# Patient Record
Sex: FEMALE | Race: WHITE | Employment: FULL TIME | URBAN - METROPOLITAN AREA
[De-identification: names, ages, dates, MRNs, and addresses within clinical notes are randomized per-mention and may not be internally consistent; named-entity substitution may affect disease eponyms.]

---

## 2017-01-19 ENCOUNTER — OFFICE VISIT (OUTPATIENT)
Dept: INTERNAL MEDICINE CLINIC | Age: 62
End: 2017-01-19

## 2017-01-19 VITALS
DIASTOLIC BLOOD PRESSURE: 81 MMHG | BODY MASS INDEX: 39.78 KG/M2 | SYSTOLIC BLOOD PRESSURE: 147 MMHG | HEART RATE: 75 BPM | HEIGHT: 64 IN | TEMPERATURE: 99 F | OXYGEN SATURATION: 98 % | WEIGHT: 233 LBS | RESPIRATION RATE: 20 BRPM

## 2017-01-19 DIAGNOSIS — I10 ESSENTIAL HYPERTENSION: Primary | ICD-10-CM

## 2017-01-19 DIAGNOSIS — E78.5 HYPERLIPIDEMIA, UNSPECIFIED HYPERLIPIDEMIA TYPE: ICD-10-CM

## 2017-01-19 RX ORDER — LISINOPRIL AND HYDROCHLOROTHIAZIDE 20; 25 MG/1; MG/1
TABLET ORAL
Qty: 30 TAB | Refills: 5 | Status: SHIPPED | OUTPATIENT
Start: 2017-01-19 | End: 2017-10-12 | Stop reason: SDUPTHER

## 2017-01-19 NOTE — PROGRESS NOTES
Reviewed record in preparation for visit and have obtained necessary documentation. Identified pt with two pt identifiers(name and ). Health Maintenance Due   Topic    Hepatitis C Screening     DTaP/Tdap/Td series (1 - Tdap)    PAP AKA CERVICAL CYTOLOGY     BREAST CANCER SCRN MAMMOGRAM     FOBT Q 1 YEAR AGE 50-75     ZOSTER VACCINE AGE 60>     INFLUENZA AGE 9 TO ADULT          No chief complaint on file. Wt Readings from Last 3 Encounters:   17 233 lb (105.7 kg)   16 235 lb (106.6 kg)   10/24/16 235 lb (106.6 kg)     Temp Readings from Last 3 Encounters:   17 99 °F (37.2 °C) (Oral)   16 98.4 °F (36.9 °C) (Oral)   10/24/16 98.3 °F (36.8 °C)     BP Readings from Last 3 Encounters:   17 147/81   16 (!) 155/91   10/24/16 135/64     Pulse Readings from Last 3 Encounters:   17 75   16 70   10/24/16 73           Learning Assessment:  :     Learning Assessment 2017   PRIMARY LEARNER Patient   PRIMARY LANGUAGE ENGLISH   LEARNER PREFERENCE PRIMARY READING   ANSWERED BY self   RELATIONSHIP SELF       Depression Screening:  :     PHQ 2 / 9, over the last two weeks 11/3/2016   Little interest or pleasure in doing things Not at all   Feeling down, depressed or hopeless Not at all   Total Score PHQ 2 0       Fall Risk Assessment:  :     No flowsheet data found. Abuse Screening:  :     Abuse Screening Questionnaire 2017   Do you ever feel afraid of your partner? N   Are you in a relationship with someone who physically or mentally threatens you? N   Is it safe for you to go home?  Y       Coordination of Care Questionnaire:  :     1) Have you been to an emergency room, urgent care clinic since your last visit? no   Hospitalized since your last visit? no             2) Have you seen or consulted any other health care providers outside of [address] [place] [address] since your last visit? no  (Include any pap smears or colon screenings in this section.)    3) Do you have an Advance Directive on file? yes    4) Are you interested in receiving information on Advance Directives? NO      Patient is accompanied by self I have received verbal consent from Nury Ladd to discuss any/all medical information while they are present in the room.

## 2017-01-19 NOTE — PROGRESS NOTES
Subjective:     Nelson Foley is a 59 y.o. female who presents for follow up of hypertension and hyperlipidemia. Diet and Lifestyle: generally follows a low fat low cholesterol diet, reports she just joined the [facility]. she plans on swimming agian. Home BP Monitoring: is not measured at home    Cardiovascular ROS: taking medications as instructed, no medication side effects noted, no TIA's, no chest pain on exertion, no dyspnea on exertion, no swelling of ankles. New concerns: she reports she still has some stress at home with daughter. Her daughter has bipolar disorder and substance abuse problems. She has been going to [employer] [facility] and that has helped a lot. She has been eating good and has joined the [facility]. She likes swimming and plans on getting back into that activity. Patient Active Problem List    Diagnosis Date Noted    Hypertension 07/28/2015    Intracranial hemorrhage ([facility] [address] 07/23/2015     Allergies   Allergen Reactions    Chloromycetin [Chloramphenicol Sod Succinate] Other (comments)     Anemia               Review of Systems, additional:  Pertinent items are noted in HPI. Objective:     Visit Vitals    /81    Pulse 75    Temp 99 °F (37.2 °C) (Oral)    Resp 20    Ht 5' 4\" (1.626 m)    Wt 233 lb (105.7 kg)    SpO2 98%    BMI 39.99 kg/m2     Appearance: alert, well appearing, and in no distress and overweight. General exam: CVS exam BP noted to be mildly elevated today in office, S1, S2 normal, no gallop, no murmur, chest clear, no JVD, no HSM, very mild edema. Lab review: orders written for new lab studies as appropriate; see orders. Assessment/Plan:     hypertension borderline controlled, hyperlipidemia control uncertain. current treatment plan is effective, no change in therapy. Kamille Daigle was seen today for medication evaluation.     Diagnoses and all orders for this visit:    Essential hypertension  -     METABOLIC PANEL, COMPREHENSIVE  -     CBC WITH AUTOMATED DIFF  - lisinopril-hydroCHLOROthiazide (PRINZIDE, ZESTORETIC) 20-25 mg per tablet; take 1 tablet by mouth once daily    Hyperlipidemia, unspecified hyperlipidemia type  -     METABOLIC PANEL, COMPREHENSIVE  -     LIPID PANEL  -     CBC WITH AUTOMATED DIFF    patient states she does not want to take the cholesterol medication. She would like to work hard at diet and exercise and it not better she will go on the the cholesterol lowering medication. She should follow up in 6 months. I have suggested she continue with the support group in reference to her daughter. She is still working on smoker cessation. 4-5 cigs daily supplemented with nicotine gum. I have encouraged her to continue to stop.

## 2017-01-20 LAB
ALBUMIN SERPL-MCNC: 4.4 G/DL (ref 3.6–4.8)
ALBUMIN/GLOB SERPL: 1.7 {RATIO} (ref 1.1–2.5)
ALP SERPL-CCNC: 85 IU/L (ref 39–117)
ALT SERPL-CCNC: 23 IU/L (ref 0–32)
AST SERPL-CCNC: 17 IU/L (ref 0–40)
BASOPHILS # BLD AUTO: 0 X10E3/UL (ref 0–0.2)
BASOPHILS NFR BLD AUTO: 0 %
BILIRUB SERPL-MCNC: 0.3 MG/DL (ref 0–1.2)
BUN SERPL-MCNC: 15 MG/DL (ref 8–27)
BUN/CREAT SERPL: 20 (ref 11–26)
CALCIUM SERPL-MCNC: 10 MG/DL (ref 8.7–10.3)
CHLORIDE SERPL-SCNC: 101 MMOL/L (ref 96–106)
CHOLEST SERPL-MCNC: 245 MG/DL (ref 100–199)
CO2 SERPL-SCNC: 23 MMOL/L (ref 18–29)
CREAT SERPL-MCNC: 0.75 MG/DL (ref 0.57–1)
EOSINOPHIL # BLD AUTO: 0.2 X10E3/UL (ref 0–0.4)
EOSINOPHIL NFR BLD AUTO: 2 %
ERYTHROCYTE [DISTWIDTH] IN BLOOD BY AUTOMATED COUNT: 13.7 % (ref 12.3–15.4)
GLOBULIN SER CALC-MCNC: 2.6 G/DL (ref 1.5–4.5)
GLUCOSE SERPL-MCNC: 86 MG/DL (ref 65–99)
HCT VFR BLD AUTO: 44.6 % (ref 34–46.6)
HDLC SERPL-MCNC: 30 MG/DL
HGB BLD-MCNC: 15.2 G/DL (ref 11.1–15.9)
IMM GRANULOCYTES # BLD: 0 X10E3/UL (ref 0–0.1)
IMM GRANULOCYTES NFR BLD: 0 %
INTERPRETATION, 910389: NORMAL
LDLC SERPL CALC-MCNC: ABNORMAL MG/DL (ref 0–99)
LYMPHOCYTES # BLD AUTO: 2.8 X10E3/UL (ref 0.7–3.1)
LYMPHOCYTES NFR BLD AUTO: 28 %
MCH RBC QN AUTO: 30.6 PG (ref 26.6–33)
MCHC RBC AUTO-ENTMCNC: 34.1 G/DL (ref 31.5–35.7)
MCV RBC AUTO: 90 FL (ref 79–97)
MONOCYTES # BLD AUTO: 0.5 X10E3/UL (ref 0.1–0.9)
MONOCYTES NFR BLD AUTO: 5 %
NEUTROPHILS # BLD AUTO: 6.3 X10E3/UL (ref 1.4–7)
NEUTROPHILS NFR BLD AUTO: 65 %
PLATELET # BLD AUTO: 293 X10E3/UL (ref 150–379)
POTASSIUM SERPL-SCNC: 3.9 MMOL/L (ref 3.5–5.2)
PROT SERPL-MCNC: 7 G/DL (ref 6–8.5)
RBC # BLD AUTO: 4.97 X10E6/UL (ref 3.77–5.28)
SODIUM SERPL-SCNC: 143 MMOL/L (ref 134–144)
TRIGL SERPL-MCNC: 417 MG/DL (ref 0–149)
VLDLC SERPL CALC-MCNC: ABNORMAL MG/DL (ref 5–40)
WBC # BLD AUTO: 9.9 X10E3/UL (ref 3.4–10.8)

## 2017-01-20 NOTE — PROGRESS NOTES
Please let the patient know all labs look okay except her cholesterol. Her triglycerides is 417 and total cholesterol is 245. I know she does not want to take cholesterol medication but with a number this high I do not believe she can get it in goal with exercise and diet alone. I think she should get back on her cholesterol medication. Please let me know the patient's thoughts.  thanks

## 2017-01-23 NOTE — PROGRESS NOTES
Writer contacted patient to inform of results per Mamta, patient verbalized understanding and is will to go back on medication.

## 2017-10-12 DIAGNOSIS — I10 ESSENTIAL HYPERTENSION: ICD-10-CM

## 2017-10-12 RX ORDER — LISINOPRIL AND HYDROCHLOROTHIAZIDE 20; 25 MG/1; MG/1
TABLET ORAL
Qty: 30 TAB | Refills: 2 | Status: SHIPPED | OUTPATIENT
Start: 2017-10-12 | End: 2017-11-03 | Stop reason: SDUPTHER

## 2017-11-03 ENCOUNTER — HOSPITAL ENCOUNTER (OUTPATIENT)
Dept: GENERAL RADIOLOGY | Age: 62
Discharge: HOME | End: 2017-11-03
Attending: PHYSICIAN ASSISTANT
Payer: COMMERCIAL

## 2017-11-03 ENCOUNTER — OFFICE VISIT (OUTPATIENT)
Dept: INTERNAL MEDICINE CLINIC | Age: 62
End: 2017-11-03

## 2017-11-03 VITALS
RESPIRATION RATE: 20 BRPM | WEIGHT: 228 LBS | BODY MASS INDEX: 38.93 KG/M2 | TEMPERATURE: 98.3 F | OXYGEN SATURATION: 97 % | HEIGHT: 64 IN

## 2017-11-03 DIAGNOSIS — I10 ESSENTIAL HYPERTENSION: Primary | ICD-10-CM

## 2017-11-03 DIAGNOSIS — R05.9 COUGH: ICD-10-CM

## 2017-11-03 DIAGNOSIS — E78.5 HYPERLIPIDEMIA, UNSPECIFIED HYPERLIPIDEMIA TYPE: ICD-10-CM

## 2017-11-03 PROCEDURE — 71020 XR CHEST PA LAT: CPT

## 2017-11-03 RX ORDER — ATORVASTATIN CALCIUM 10 MG/1
TABLET, FILM COATED ORAL
Qty: 90 TAB | Refills: 1 | Status: SHIPPED | OUTPATIENT
Start: 2017-11-03 | End: 2018-05-29 | Stop reason: SDUPTHER

## 2017-11-03 RX ORDER — LEVOFLOXACIN 500 MG/1
500 TABLET, FILM COATED ORAL DAILY
Qty: 7 TAB | Refills: 0 | Status: SHIPPED | OUTPATIENT
Start: 2017-11-03 | End: 2018-02-07 | Stop reason: ALTCHOICE

## 2017-11-03 RX ORDER — LISINOPRIL AND HYDROCHLOROTHIAZIDE 20; 25 MG/1; MG/1
TABLET ORAL
Qty: 90 TAB | Refills: 1 | Status: SHIPPED | OUTPATIENT
Start: 2017-11-03 | End: 2019-03-01 | Stop reason: SDUPTHER

## 2017-11-03 RX ORDER — CODEINE PHOSPHATE AND GUAIFENESIN 10; 100 MG/5ML; MG/5ML
5 SOLUTION ORAL
Qty: 100 ML | Refills: 0 | Status: SHIPPED | OUTPATIENT
Start: 2017-11-03 | End: 2018-02-07 | Stop reason: ALTCHOICE

## 2017-11-03 NOTE — PROGRESS NOTES
Reviewed record in preparation for visit and have obtained necessary documentation. Identified pt with two pt identifiers(name and ). Health Maintenance Due   Topic    Hepatitis C Screening     DTaP/Tdap/Td series (1 - Tdap)    PAP AKA CERVICAL CYTOLOGY     BREAST CANCER SCRN MAMMOGRAM     FOBT Q 1 YEAR AGE 50-75     ZOSTER VACCINE AGE 60>     INFLUENZA AGE 9 TO ADULT          No chief complaint on file. Wt Readings from Last 3 Encounters:   17 228 lb (103.4 kg)   17 233 lb (105.7 kg)   16 235 lb (106.6 kg)     Temp Readings from Last 3 Encounters:   17 98.3 °F (36.8 °C) (Oral)   17 99 °F (37.2 °C) (Oral)   16 98.4 °F (36.9 °C) (Oral)     BP Readings from Last 3 Encounters:   17 147/81   16 (!) 155/91   10/24/16 135/64     Pulse Readings from Last 3 Encounters:   17 75   16 70   10/24/16 73           Learning Assessment:  :     Learning Assessment 2017   PRIMARY LEARNER Patient   PRIMARY LANGUAGE ENGLISH   LEARNER PREFERENCE PRIMARY READING   ANSWERED BY self   RELATIONSHIP SELF       Depression Screening:  :     PHQ over the last two weeks 2017   Little interest or pleasure in doing things Not at all   Feeling down, depressed or hopeless Not at all   Total Score PHQ 2 0       Fall Risk Assessment:  :     No flowsheet data found. Abuse Screening:  :     Abuse Screening Questionnaire 2017   Do you ever feel afraid of your partner? N   Are you in a relationship with someone who physically or mentally threatens you? N   Is it safe for you to go home?  Y       Coordination of Care Questionnaire:  :     1) Have you been to an emergency room, urgent care clinic since your last visit? no   Hospitalized since your last visit? no             2) Have you seen or consulted any other health care providers outside of [address] [place] [address] since your last visit? no  (Include any pap smears or colon screenings in this section.)    3) Do you have an Advance Directive on file? yes    4) Are you interested in receiving information on Advance Directives? NO      Patient is accompanied by self I have received verbal consent from Chaur Velasquez to discuss any/all medical information while they are present in the room.

## 2017-11-03 NOTE — PROGRESS NOTES
Subjective:   Zen Montaño is a 57 y.o. female who complains of productive cough, headache and fatigue (feeling feverish) for 2 days, gradually worsening since that time. She denies a history of nausea. She reports she has two co-workers out with pneumonia. She started yesterday with sore throat and headache. She went to work but by the end of the day she was coughing. This am she feels very tired. Her cough has been productive. She is concerned because she has had pneumonia in the past. She states she has been wheezing as well. Evaluation to date: none. Treatment to date: none. Patient does smoke cigarettes. Relevant PMH: No pertinent additional PMH. Patient Active Problem List    Diagnosis Date Noted    Hypertension 07/28/2015    Intracranial hemorrhage ([facility] [address] 07/23/2015     Allergies   Allergen Reactions    Chloromycetin [Chloramphenicol Sod Succinate] Other (comments)     Anemia          Review of Systems  Pertinent items are noted in HPI. Objective:     Visit Vitals    Temp 98.3 °F (36.8 °C) (Oral)    Resp 20    Ht 5' 4\" (1.626 m)    Wt 228 lb (103.4 kg)    SpO2 97%    BMI 39.14 kg/m2     General:  alert, cooperative, no distress   Eyes: negative   Ears: normal TM's and external ear canals AU   Sinuses: Normal paranasal sinuses without tenderness   Mouth:  abnormal findings: mild oropharyngeal erythema   Neck: no adenopathy. Heart: normal rate and regular rhythm, no murmurs noted. Lungs: clear to auscultation bilaterally, no current wheeze   Abdomen: Not examined        Assessment/Plan:   bronchitis  Antibiotics per orders. RTC prn. Diagnoses and all orders for this visit:    1. Essential hypertension  -     CBC WITH AUTOMATED DIFF  -     METABOLIC PANEL, COMPREHENSIVE  -     lisinopril-hydroCHLOROthiazide (PRINZIDE, ZESTORETIC) 20-25 mg per tablet; take 1 tablet by mouth once daily    2.  Hyperlipidemia, unspecified hyperlipidemia type  -     CBC WITH AUTOMATED DIFF  - METABOLIC PANEL, COMPREHENSIVE  -     LIPID PANEL  -     atorvastatin (LIPITOR) 10 mg tablet; take 1 tablet by mouth once daily    3. Cough  -     XR CHEST PA LAT; Future  -     levoFLOXacin (LEVAQUIN) 500 mg tablet; Take 1 Tab by mouth daily.  -     guaiFENesin-codeine (CHERATUSSIN AC) 100-10 mg/5 mL solution; Take 5 mL by mouth three (3) times daily as needed for Cough or Congestion. Max Daily Amount: 15 mL. .refills ordered. I will contact her with the results once back. I would like for the patient to get a chest xray today. Advised her to drink fluids and rest. All this discussed with the patient, she understands and agrees.

## 2017-11-03 NOTE — MR AVS SNAPSHOT
Visit Information Date & Time Provider Department Dept. Phone Encounter #  
 11/3/2017 11:10 AM Bobby Quinones PA-C [facility] [phone] [id number] Upcoming Health Maintenance Date Due Hepatitis C Screening 1955 DTaP/Tdap/Td series (1 - Tdap) 10/25/1974 PAP AKA CERVICAL CYTOLOGY 10/25/1974 BREAST CANCER SCRN MAMMOGRAM 10/25/2003 FOBT Q 1 YEAR AGE 50-75 10/25/2003 ZOSTER VACCINE AGE 60> 8/25/2013 INFLUENZA AGE 9 TO ADULT 8/1/2017 Allergies as of 11/3/2017  In Progress On: 11/3/2017 By: Jonah Humphrey LPN Severity Noted Reaction Type Reactions Chloromycetin [Chloramphenicol Sod Succinate]  07/23/2015    Other (comments) Anemia Current Immunizations  Never Reviewed Name Date Pneumococcal Polysaccharide (PPSV-23) 7/24/2015 12:31 PM  
  
 Not reviewed this visit You Were Diagnosed With   
  
 Codes Comments Essential hypertension    -  Primary ICD-10-CM: I10 
ICD-9-CM: 401.9 Hyperlipidemia, unspecified hyperlipidemia type     ICD-10-CM: E78.5 ICD-9-CM: 272.4 Cough     ICD-10-CM: R05 ICD-9-CM: 685. 2 Vitals Temp Resp Height(growth percentile) Weight(growth percentile) SpO2 BMI  
 98.3 °F (36.8 °C) (Oral) 20 5' 4\" (1.626 m) 228 lb (103.4 kg) 97% 39.14 kg/m2 OB Status Smoking Status Postmenopausal Current Every Day Smoker BMI and BSA Data Body Mass Index Body Surface Area  
 39.14 kg/m 2 2.16 m 2 Preferred Pharmacy Pharmacy Name Phone [facility] [address] [place] [address] [phone] Your Updated Medication List  
  
   
This list is accurate as of: 11/3/17 11:17 AM.  Always use your most recent med list.  
  
  
  
  
 albuterol 90 mcg/actuation inhaler Commonly known as:  PROVENTIL HFA, VENTOLIN HFA, PROAIR HFA Take 2 Puffs by inhalation every four (4) hours as needed for Wheezing. aspirin 81 mg chewable tablet Take 81 mg by mouth daily. Indications: heart palpitations  
  
 atorvastatin 10 mg tablet Commonly known as:  LIPITOR  
take 1 tablet by mouth once daily BENADRYL 25 mg capsule Generic drug:  diphenhydrAMINE Take 25 mg by mouth every four (4) hours as needed. EPINEPHrine 0.3 mg/0.3 mL injection Commonly known as:  EPIPEN 2-ASHLEY  
0.3 mL by IntraMUSCular route once as needed for up to 1 dose. guaiFENesin-codeine 100-10 mg/5 mL solution Commonly known as:  Katheen Hoe Take 5 mL by mouth three (3) times daily as needed for Cough or Congestion. Max Daily Amount: 15 mL. levoFLOXacin 500 mg tablet Commonly known as:  Charlcie Abelino Take 1 Tab by mouth daily. lisinopril-hydroCHLOROthiazide 20-25 mg per tablet Commonly known as:  PRINZIDE, ZESTORETIC  
take 1 tablet by mouth once daily MAGNESIUM CITRATE PO Take 1,000 Caps by mouth daily. TYLENOL 325 mg tablet Generic drug:  acetaminophen Take 325 mg by mouth as needed for Pain. Indications: PAIN, head VITAMIN C 1,000 mg Pwep Generic drug:  Ascorbate Ca-Multivit-Min Take 1,000 mg by mouth daily. Prescriptions Printed Refills  
 guaiFENesin-codeine (CHERATUSSIN AC) 100-10 mg/5 mL solution 0 Sig: Take 5 mL by mouth three (3) times daily as needed for Cough or Congestion. Max Daily Amount: 15 mL. Class: Print Route: Oral  
  
Prescriptions Sent to Pharmacy Refills  
 levoFLOXacin (LEVAQUIN) 500 mg tablet 0 Sig: Take 1 Tab by mouth daily. Class: Normal  
 Pharmacy: [address] [facility]  Stana Meckel, [address] [place] [address] #: [phone] Route: Oral  
  
We Performed the Following CBC WITH AUTOMATED DIFF [21334 CPT(R)] LIPID PANEL [17632 CPT(R)] METABOLIC PANEL, COMPREHENSIVE [50074 CPT(R)] To-Do List   
 11/03/2017 Imaging:  XR CHEST PA LAT Introducing [facility]!    
 Dear Rosalva Garcia: 
 Thank you for requesting a [employer] account. Our records indicate that you already have an active [employer] account. You can access your account anytime at [email or web address]. [email or web address] Did you know that you can access your hospital and ER discharge instructions at any time in [employer]? You can also review all of your test results from your hospital stay or ER visit. Additional Information If you have questions, please visit the Frequently Asked Questions section of the [employer] website at [email or web address]. [email or web address] Remember, [employer] is NOT to be used for urgent needs. For medical emergencies, dial 911. Now available from your iPhone and Android! Please provide this summary of care documentation to your next provider. Your primary care clinician is listed as Mamta Montero. If you have any questions after today's visit, please call [phone].

## 2017-11-03 NOTE — LETTER
NOTIFICATION RETURN TO WORK / SCHOOL 
 
11/3/2017 11:13 AM 
 
Ms. Zaid Mireles [facility] [address] [phone] To Whom It May Concern: 
 
Zaid Mireles is currently under the care of Fam Sanchez.. She will return to work/school on: 11/6/2017 If there are questions or concerns please have the patient contact our office.  
 
 
 
Sincerely, 
 
 
Andrés Montero PA-C

## 2017-11-03 NOTE — PROGRESS NOTES
Writer contacted patient to inform of xray results per Srinivasa Garcia, patient verbalized understanding.

## 2017-11-03 NOTE — PROGRESS NOTES
Please let the patient know no pneumonia on x-ray seen. Please finish the medication and follow up if not better.  thanks

## 2018-02-07 ENCOUNTER — OFFICE VISIT (OUTPATIENT)
Dept: INTERNAL MEDICINE CLINIC | Age: 63
End: 2018-02-07

## 2018-02-07 VITALS
BODY MASS INDEX: 40.15 KG/M2 | OXYGEN SATURATION: 96 % | WEIGHT: 235.2 LBS | HEART RATE: 68 BPM | TEMPERATURE: 98.3 F | SYSTOLIC BLOOD PRESSURE: 138 MMHG | DIASTOLIC BLOOD PRESSURE: 76 MMHG | RESPIRATION RATE: 18 BRPM | HEIGHT: 64 IN

## 2018-02-07 DIAGNOSIS — H65.92 LEFT NON-SUPPURATIVE OTITIS MEDIA: ICD-10-CM

## 2018-02-07 DIAGNOSIS — H92.02 EAR PAIN, LEFT: Primary | ICD-10-CM

## 2018-02-07 PROBLEM — E66.01 OBESITY, MORBID (HCC): Status: ACTIVE | Noted: 2018-02-07

## 2018-02-07 NOTE — PROGRESS NOTES
Chief Complaint   Patient presents with    Ear Pain     previous bilateral ear infection- two weeks ago at [facility] (01/24)     1. Have you been to the ER, urgent care clinic since your last visit? Hospitalized since your last visit? No    2. Have you seen or consulted any other health care providers outside of the [address] [place] [address] since your last visit? Include any pap smears or colon screening.  No

## 2018-02-07 NOTE — PATIENT INSTRUCTIONS
Start Mucinex 1200mg twice a day (plain not D or DM). Start Saline nasal spray 2 squirts each nostril 2-3 times daily.

## 2018-02-07 NOTE — PROGRESS NOTES
HISTORY OF PRESENT ILLNESS  Duncan Alvarez is a 57 y.o. female. HPI   Diagnosed with bilat ear infection 1/24 by [facility]. Had a sore throat but ears were not hurting at that time. Had negative stress test.  Placed on Augmentin and lidocaine mouthwash (did not help). Improved but still having pain in left jaw and posterior ear. Right ear and jaw/throat feels normal.  Hearing on left feels \"a little cloudy\". Congestion in nasal passages and sinuses. Nasal discharge is white with occ yellow. No cough, no fevers or chills. Took some Allegra. Current Outpatient Prescriptions:     lisinopril-hydroCHLOROthiazide (PRINZIDE, ZESTORETIC) 20-25 mg per tablet, take 1 tablet by mouth once daily, Disp: 90 Tab, Rfl: 1    atorvastatin (LIPITOR) 10 mg tablet, take 1 tablet by mouth once daily, Disp: 90 Tab, Rfl: 1    diphenhydrAMINE (BENADRYL) 25 mg capsule, Take 25 mg by mouth every four (4) hours as needed. , Disp: , Rfl:     albuterol (PROVENTIL HFA, VENTOLIN HFA, PROAIR HFA) 90 mcg/actuation inhaler, Take 2 Puffs by inhalation every four (4) hours as needed for Wheezing., Disp: 1 Inhaler, Rfl: 1    Ascorbate Ca-Multivit-Min (VITAMIN C) 1,000 mg pwep, Take 1,000 mg by mouth daily. , Disp: , Rfl:     MAGNESIUM CITRATE PO, Take 1,000 Caps by mouth daily. , Disp: , Rfl:     EPINEPHrine (EPIPEN 2-ASHLEY) 0.3 mg/0.3 mL (1:1,000) injection, 0.3 mL by IntraMUSCular route once as needed for up to 1 dose., Disp: 1 Syringe, Rfl: 2    acetaminophen (TYLENOL) 325 mg tablet, Take 325 mg by mouth as needed for Pain. Indications: PAIN, head, Disp: , Rfl:     aspirin 81 mg chewable tablet, Take 81 mg by mouth daily.  Indications: heart palpitations, Disp: , Rfl:     Visit Vitals    /76 (BP 1 Location: Right arm, BP Patient Position: Sitting)    Pulse 68    Temp 98.3 °F (36.8 °C) (Oral)    Resp 18    Ht 5' 4\" (1.626 m)    Wt 235 lb 3.2 oz (106.7 kg)    SpO2 96%    BMI 40.37 kg/m2     ROS  See above  Physical Exam Constitutional: She appears well-developed and well-nourished. HENT:   Head: Normocephalic and atraumatic. Right Ear: External ear normal.   Left ear - TM fullness without erythema. nml ext canal.  Tenderness post left ear. Sinuses nontender  Nares - edematous with thick discharge. OP - post nasal drainage. Neck: Neck supple. No thyromegaly present. Cardiovascular: Normal rate, regular rhythm and normal heart sounds. Pulmonary/Chest: Effort normal and breath sounds normal.   Vitals reviewed. ASSESSMENT and PLAN  Left posterior ear pain with recent left ear infection - ? Infection vs inflammation. Check CBC. Hold additional abx at this time. Mucinex and saline ns.     Orders Placed This Encounter    CBC WITH AUTOMATED DIFF    guaiFENesin (MUCINEX) 1,200 mg Ta12 ER tablet    sodium chloride (SALINE NASAL) 0.65 % nasal squeeze bottle     Follow-up Disposition: Not on File

## 2018-02-08 LAB
BASOPHILS # BLD AUTO: 0.1 X10E3/UL (ref 0–0.2)
BASOPHILS NFR BLD AUTO: 1 %
EOSINOPHIL # BLD AUTO: 0.2 X10E3/UL (ref 0–0.4)
EOSINOPHIL NFR BLD AUTO: 2 %
ERYTHROCYTE [DISTWIDTH] IN BLOOD BY AUTOMATED COUNT: 14 % (ref 12.3–15.4)
HCT VFR BLD AUTO: 42.6 % (ref 34–46.6)
HGB BLD-MCNC: 14.5 G/DL (ref 11.1–15.9)
IMM GRANULOCYTES # BLD: 0 X10E3/UL (ref 0–0.1)
IMM GRANULOCYTES NFR BLD: 0 %
LYMPHOCYTES # BLD AUTO: 3.1 X10E3/UL (ref 0.7–3.1)
LYMPHOCYTES NFR BLD AUTO: 34 %
MCH RBC QN AUTO: 30.1 PG (ref 26.6–33)
MCHC RBC AUTO-ENTMCNC: 34 G/DL (ref 31.5–35.7)
MCV RBC AUTO: 88 FL (ref 79–97)
MONOCYTES # BLD AUTO: 0.5 X10E3/UL (ref 0.1–0.9)
MONOCYTES NFR BLD AUTO: 5 %
NEUTROPHILS # BLD AUTO: 5.2 X10E3/UL (ref 1.4–7)
NEUTROPHILS NFR BLD AUTO: 58 %
PLATELET # BLD AUTO: 287 X10E3/UL (ref 150–379)
RBC # BLD AUTO: 4.82 X10E6/UL (ref 3.77–5.28)
WBC # BLD AUTO: 9 X10E3/UL (ref 3.4–10.8)

## 2018-02-09 ENCOUNTER — TELEPHONE (OUTPATIENT)
Dept: INTERNAL MEDICINE CLINIC | Age: 63
End: 2018-02-09

## 2018-02-20 ENCOUNTER — OFFICE VISIT (OUTPATIENT)
Dept: INTERNAL MEDICINE CLINIC | Age: 63
End: 2018-02-20

## 2018-02-20 VITALS
WEIGHT: 235 LBS | HEART RATE: 74 BPM | SYSTOLIC BLOOD PRESSURE: 149 MMHG | BODY MASS INDEX: 40.12 KG/M2 | OXYGEN SATURATION: 96 % | HEIGHT: 64 IN | DIASTOLIC BLOOD PRESSURE: 87 MMHG | TEMPERATURE: 98.2 F | RESPIRATION RATE: 20 BRPM

## 2018-02-20 DIAGNOSIS — R22.2 MASS OF CHEST WALL, LEFT: Primary | ICD-10-CM

## 2018-02-20 DIAGNOSIS — R22.2 MASS OF SUBCUTANEOUS TISSUE OF BACK: ICD-10-CM

## 2018-02-20 NOTE — PROGRESS NOTES
Reviewed record in preparation for visit and have obtained necessary documentation. Identified pt with two pt identifiers(name and ). Health Maintenance Due   Topic    Hepatitis C Screening     DTaP/Tdap/Td series (1 - Tdap)    PAP AKA CERVICAL CYTOLOGY     BREAST CANCER SCRN MAMMOGRAM     FOBT Q 1 YEAR AGE 54-65     ZOSTER VACCINE AGE 60>          No chief complaint on file. Wt Readings from Last 3 Encounters:   18 235 lb (106.6 kg)   18 235 lb 3.2 oz (106.7 kg)   17 228 lb (103.4 kg)     Temp Readings from Last 3 Encounters:   18 98.2 °F (36.8 °C) (Oral)   18 98.3 °F (36.8 °C) (Oral)   17 98.3 °F (36.8 °C) (Oral)     BP Readings from Last 3 Encounters:   18 138/76   17 147/81   16 (!) 155/91     Pulse Readings from Last 3 Encounters:   18 68   17 75   16 70           Learning Assessment:  :     Learning Assessment 2018   PRIMARY LEARNER Patient Patient   PRIMARY LANGUAGE ENGLISH ENGLISH   LEARNER PREFERENCE PRIMARY DEMONSTRATION READING   ANSWERED BY self self   RELATIONSHIP SELF SELF       Depression Screening:  :     PHQ over the last two weeks 11/3/2017   Little interest or pleasure in doing things Not at all   Feeling down, depressed or hopeless Not at all   Total Score PHQ 2 0       Fall Risk Assessment:  :     No flowsheet data found. Abuse Screening:  :     Abuse Screening Questionnaire 2018   Do you ever feel afraid of your partner? N N   Are you in a relationship with someone who physically or mentally threatens you? N N   Is it safe for you to go home? Y Y       Coordination of Care Questionnaire:  :     1) Have you been to an emergency room, urgent care clinic since your last visit? yes   Hospitalized since your last visit? no             2) Have you seen or consulted any other health care providers outside of [address] [place] [address] since your last visit?  yes  (Include any pap smears or colon screenings in this section.)    3) Do you have an Advance Directive on file? no    4) Are you interested in receiving information on Advance Directives? NO      Patient is accompanied by self I have received verbal consent from Tano Galeas to discuss any/all medical information while they are present in the room.

## 2018-02-20 NOTE — PROGRESS NOTES
HISTORY OF PRESENT ILLNESS  Michael Espana is a 57 y.o. female. HPI  Patient presents to the office for evaluation of some bumps. She reports she noticed these lumps about 2 years ago but always ignored them because they never caused problems. Recently now she has noticed more lumps and some of them are painful. She states she has not been sleeping well due to some pain. She reports the one on her chest she thought was a lymph node so she asked about it during her mammogram and was told it was not a lymph node. Review of Systems   Skin:        \"lumps\" under the skin     Blood pressure 149/87, pulse 74, temperature 98.2 °F (36.8 °C), temperature source Oral, resp. rate 20, height 5' 4\" (1.626 m), weight 235 lb (106.6 kg), SpO2 96 %. Physical Exam   Skin:   Anterior chest left side just above the breast- palpable soft smooth feeling mass noted. Right anterior chest just below the ribs- palpable soft smooth feeling mass  Right side upper back- palpable soft mass noted. ASSESSMENT and PLAN  Diagnoses and all orders for this visit:    1. Mass of chest wall, left  -     [facility] General Surgery ref [facility]    2. Mass of subcutaneous tissue of back  -     Giovanni General Surgery ref [facility]    I explained to the patient that the areas feel like cyst. I would like for her to see the general surgeon to have these area evaluated. She will contact him for an appointment.  All this discussed with the patient and she understands and agrees

## 2018-02-27 ENCOUNTER — OFFICE VISIT (OUTPATIENT)
Dept: SURGERY | Age: 63
End: 2018-02-27

## 2018-02-27 VITALS
SYSTOLIC BLOOD PRESSURE: 140 MMHG | TEMPERATURE: 99.6 F | RESPIRATION RATE: 20 BRPM | BODY MASS INDEX: 40.12 KG/M2 | HEART RATE: 79 BPM | OXYGEN SATURATION: 97 % | DIASTOLIC BLOOD PRESSURE: 80 MMHG | WEIGHT: 235 LBS | HEIGHT: 64 IN

## 2018-02-27 DIAGNOSIS — R22.9 SUBCUTANEOUS MASS: Primary | ICD-10-CM

## 2018-02-28 PROBLEM — R22.9 SUBCUTANEOUS MASS: Status: ACTIVE | Noted: 2018-02-28

## 2018-02-28 NOTE — PROGRESS NOTES
Surgery Consult    Subjective:      Noé Ochoa is a 57 y.o. female who is being seen for evaluation of subcutaneous masses on her trunk. She notes the gradual development of subcutaneous nodules beneath skin of chest, upper abdomen. These lesions have slowly increased in size and have become tender, worse with activity or palpation. She denies prior infection/drainage, fever, chills, chest pain, or wheezing. Patient Active Problem List    Diagnosis Date Noted    Subcutaneous masses, trunk 02/28/2018    Obesity, morbid ([facility] 02/07/2018    Hypertension 07/28/2015    Intracranial hemorrhage ([facility] 07/23/2015     Past Medical History:   Diagnosis Date    High cholesterol     Hypertension     Ill-defined condition 2015    frontal lobe bleed    Musculoskeletal disorder       Past Surgical History:   Procedure Laterality Date    HX ABDOMINAL LAPAROSCOPY      HX APPENDECTOMY      HX TONSILLECTOMY      HX TUBAL LIGATION  1989      Social History   Substance Use Topics    Smoking status: Current Every Day Smoker     Packs/day: 0.50     Years: 42.00     Types: Cigarettes     Last attempt to quit: 5/19/2015    Smokeless tobacco: Never Used    Alcohol use Yes      Comment: occasional      Family History   Problem Relation Age of Onset    Heart Disease Mother     Hypertension Mother     Diabetes Mother     Heart Disease Father     Diabetes Father     Hypertension Father       Current Outpatient Prescriptions   Medication Sig    lisinopril-hydroCHLOROthiazide (PRINZIDE, ZESTORETIC) 20-25 mg per tablet take 1 tablet by mouth once daily    atorvastatin (LIPITOR) 10 mg tablet take 1 tablet by mouth once daily    Ascorbate Ca-Multivit-Min (VITAMIN C) 1,000 mg pwep Take 1,000 mg by mouth daily.  MAGNESIUM CITRATE PO Take 1,000 Caps by mouth daily.  acetaminophen (TYLENOL) 325 mg tablet Take 325 mg by mouth as needed for Pain.  Indications: PAIN, head    aspirin 81 mg chewable tablet Take 81 mg by mouth daily. Indications: heart palpitations    guaiFENesin (MUCINEX) 1,200 mg Ta12 ER tablet Take 1 Tab by mouth two (2) times a day.  sodium chloride (SALINE NASAL) 0.65 % nasal squeeze bottle 0.1 mL by Both Nostrils route three (3) times daily.  diphenhydrAMINE (BENADRYL) 25 mg capsule Take 25 mg by mouth every four (4) hours as needed.  albuterol (PROVENTIL HFA, VENTOLIN HFA, PROAIR HFA) 90 mcg/actuation inhaler Take 2 Puffs by inhalation every four (4) hours as needed for Wheezing.  EPINEPHrine (EPIPEN 2-ASHLEY) 0.3 mg/0.3 mL (1:1,000) injection 0.3 mL by IntraMUSCular route once as needed for up to 1 dose. No current facility-administered medications for this visit. Allergies   Allergen Reactions    Chloromycetin [Chloramphenicol Sod Succinate] Other (comments)     Anemia         Review of Systems:    A complete review of systems was negative except as noted in the HPI. Objective:        Visit Vitals    /80    Pulse 79    Temp 99.6 °F (37.6 °C)    Resp 20    Ht 5' 4\" (1.626 m)    Wt 235 lb (106.6 kg)    SpO2 97%    BMI 40.34 kg/m2       Physical Exam:  GENERAL: alert, cooperative, no distress, appears stated age, morbidly obese, EYE: negative, LYMPH NODES: Cervical, supraclavicular nodes normal. THROAT & NECK: normal, LUNG: clear to auscultation bilaterally, HEART: regular rate and rhythm, S1, S2 normal, no murmur. ABDOMEN: Obese, non-distended, soft; no hernia or pain with palpation. EXTREMITIES:  extremities normal, atraumatic, no cyanosis or edema, SKIN: multiple 1 x 1 cm tender subcutaneous lesions along left chest, right chest, and right upper quadrant. No overlying cellulitis. NEUROLOGIC: negative      Assessment:     Multiple tender subcutaneous masses over upper trunk. Plan:     1. I recommend proceeding with excisional biopsy of upper trunk lesions.      2. Discussed aspects of surgical intervention, methods, risks (including by not limited to infection, bleeding, hematoma, recurrence, and development of new lesions), and the risks of the anesthetic. The patient understands the risks; all questions were answered to the patient's satisfaction. 3. Patient does wish to proceed with surgery.       Signed By: Jose G Newsome MD     February 28, 2018

## 2018-02-28 NOTE — PATIENT INSTRUCTIONS
Epidermoid Cyst: Care Instructions  Your Care Instructions  An epidermoid (say \"xb-hmw-EST-brenda\") cyst is a lump just under the skin. These cysts can form when a hair follicle becomes blocked. They are common in acne and may occur on the face, neck, back, and genitals. However, they can form anywhere on the body. These cysts are not cancer and do not lead to cancer. They tend not to hurt, but they can sometimes become swollen and painful. They also may break open (rupture) and cause scarring. These cysts sometimes do not cause problems and may not need treatment. If you have a cyst that is swollen and hurts, your doctor may inject it with a medicine to help it heal. But it is more likely that a painful cyst will need to be removed. Your doctor will give you a shot of numbing medicine and cut into the cyst to drain it or remove it. This makes the symptoms go away. Follow-up care is a key part of your treatment and safety. Be sure to make and go to all appointments, and call your doctor if you are having problems. It's also a good idea to know your test results and keep a list of the medicines you take. How can you care for yourself at home? · Do not squeeze the cyst or poke it with a needle to open it. This can cause swelling, redness, and infection. · Always have a doctor look at any new lumps you get to make sure that they are not serious. When should you call for help? Watch closely for changes in your health, and be sure to contact your doctor if:  ? · You have a fever, redness, or swelling after you get a shot of medicine in the cyst.   ? · You see or feel a new lump on your skin. Where can you learn more? Go to http://kaykay-lucas.info/. Enter G748 in the search box to learn more about \"Epidermoid Cyst: Care Instructions. \"  Current as of: October 13, 2016  Content Version: 11.4  © 6583-1898 [employer].  Care instructions adapted under license by [employer] (which disclaims liability or warranty for this information). If you have questions about a medical condition or this instruction, always ask your healthcare professional. [address] any warranty or liability for your use of this information.

## 2018-03-26 DIAGNOSIS — I10 ESSENTIAL HYPERTENSION: ICD-10-CM

## 2018-03-27 RX ORDER — LISINOPRIL AND HYDROCHLOROTHIAZIDE 20; 25 MG/1; MG/1
TABLET ORAL
Qty: 30 TAB | Refills: 2 | Status: SHIPPED | OUTPATIENT
Start: 2018-03-27 | End: 2018-05-29 | Stop reason: SDUPTHER

## 2018-05-29 ENCOUNTER — OFFICE VISIT (OUTPATIENT)
Dept: INTERNAL MEDICINE CLINIC | Age: 63
End: 2018-05-29

## 2018-05-29 VITALS
DIASTOLIC BLOOD PRESSURE: 83 MMHG | BODY MASS INDEX: 40.97 KG/M2 | TEMPERATURE: 98.4 F | RESPIRATION RATE: 20 BRPM | SYSTOLIC BLOOD PRESSURE: 143 MMHG | WEIGHT: 240 LBS | HEART RATE: 75 BPM | OXYGEN SATURATION: 98 % | HEIGHT: 64 IN

## 2018-05-29 DIAGNOSIS — E78.5 HYPERLIPIDEMIA, UNSPECIFIED HYPERLIPIDEMIA TYPE: ICD-10-CM

## 2018-05-29 DIAGNOSIS — Z11.59 NEED FOR HEPATITIS B SCREENING TEST: ICD-10-CM

## 2018-05-29 DIAGNOSIS — Z00.00 ANNUAL PHYSICAL EXAM: Primary | ICD-10-CM

## 2018-05-29 DIAGNOSIS — Z12.31 SCREENING MAMMOGRAM, ENCOUNTER FOR: ICD-10-CM

## 2018-05-29 DIAGNOSIS — Z12.11 COLON CANCER SCREENING: ICD-10-CM

## 2018-05-29 RX ORDER — ATORVASTATIN CALCIUM 10 MG/1
TABLET, FILM COATED ORAL
Qty: 90 TAB | Refills: 1 | Status: SHIPPED | OUTPATIENT
Start: 2018-05-29 | End: 2019-03-01 | Stop reason: SDUPTHER

## 2018-05-29 NOTE — MR AVS SNAPSHOT
[address] [place] [address] [address] [place] [address] 
[phone] Patient: Hanna Dooley MRN: [id number] :1955 Visit Information Date & Time Provider Department Dept. Phone Encounter #  
 2018  7:50 AM Jefry Ramirez PA-C [facility] [phone] [id number] Upcoming Health Maintenance Date Due Hepatitis C Screening 1955 DTaP/Tdap/Td series (1 - Tdap) 10/25/1974 PAP AKA CERVICAL CYTOLOGY 10/25/1974 BREAST CANCER SCRN MAMMOGRAM 10/25/2003 FOBT Q 1 YEAR AGE 50-75 10/25/2003 ZOSTER VACCINE AGE 60> 2013 Influenza Age 5 to Adult 2018 Allergies as of 2018  In Progress On: 2018 By: Aneudy Farr LPN Severity Noted Reaction Type Reactions Chloromycetin [Chloramphenicol Sod Succinate]  2015    Other (comments) Anemia Current Immunizations  Never Reviewed Name Date Pneumococcal Polysaccharide (PPSV-23) 2015 12:31 PM  
  
 Not reviewed this visit You Were Diagnosed With   
  
 Codes Comments Annual physical exam    -  Primary ICD-10-CM: Z00.00 ICD-9-CM: V70.0 Screening mammogram, encounter for     ICD-10-CM: Z12.31 
ICD-9-CM: V76.12 Colon cancer screening     ICD-10-CM: Z12.11 ICD-9-CM: V76.51 Need for hepatitis B screening test     ICD-10-CM: Z11.59 
ICD-9-CM: V73.89 Hyperlipidemia, unspecified hyperlipidemia type     ICD-10-CM: E78.5 ICD-9-CM: 272.4 Vitals BP Pulse Temp Resp Height(growth percentile) Weight(growth percentile) 143/83 75 98.4 °F (36.9 °C) (Oral) 20 5' 4\" (1.626 m) 240 lb (108.9 kg) SpO2 BMI OB Status Smoking Status 98% 41.2 kg/m2 Postmenopausal Current Every Day Smoker BMI and BSA Data Body Mass Index Body Surface Area  
 41.2 kg/m 2 2.22 m 2 Preferred Pharmacy Pharmacy Name Phone  [facility] [address] [phone] Your Updated Medication List  
  
   
This list is accurate as of 5/29/18  8:25 AM.  Always use your most recent med list.  
  
  
  
  
 albuterol 90 mcg/actuation inhaler Commonly known as:  PROVENTIL HFA, VENTOLIN HFA, PROAIR HFA Take 2 Puffs by inhalation every four (4) hours as needed for Wheezing. aspirin 81 mg chewable tablet Take 81 mg by mouth daily. Indications: heart palpitations  
  
 atorvastatin 10 mg tablet Commonly known as:  LIPITOR  
take 1 tablet by mouth once daily BENADRYL 25 mg capsule Generic drug:  diphenhydrAMINE Take 25 mg by mouth every four (4) hours as needed. EPINEPHrine 0.3 mg/0.3 mL injection Commonly known as:  EPIPEN 2-ASHLEY  
0.3 mL by IntraMUSCular route once as needed for up to 1 dose. lisinopril-hydroCHLOROthiazide 20-25 mg per tablet Commonly known as:  PRINZIDE, ZESTORETIC  
take 1 tablet by mouth once daily MAGNESIUM CITRATE PO Take 1,000 Caps by mouth daily. sodium chloride 0.65 % nasal squeeze bottle Commonly known as:  SALINE NASAL  
0.1 mL by Both Nostrils route three (3) times daily. TYLENOL 325 mg tablet Generic drug:  acetaminophen Take 325 mg by mouth as needed for Pain. Indications: PAIN, head VITAMIN C 1,000 mg Pwep Generic drug:  Ascorbate Ca-Multivit-Min Take 1,000 mg by mouth daily. Prescriptions Sent to Pharmacy Refills  
 atorvastatin (LIPITOR) 10 mg tablet 1 Sig: take 1 tablet by mouth once daily Class: Normal  
 Pharmacy: [address] [place] [address] #: [phone] We Performed the Following CBC WITH AUTOMATED DIFF [47835 CPT(R)] HEPATITIS C AB [57887 CPT(R)] LIPID PANEL [53940 CPT(R)] METABOLIC PANEL, COMPREHENSIVE [71048 CPT(R)] REFERRAL TO GASTROENTEROLOGY [XCH99 Custom] TSH 3RD GENERATION [64695 CPT(R)] VITAMIN D, 25 HYDROXY F689868 CPT(R)] To-Do List   
 05/30/2018 Imaging:  [facility] MAMMO BI SCREENING INCL CAD Referral Information Referral ID Referred By Referred To  
  
 [id number] Usman STEWARD MD   
   [address] [facility] [address] [place] [address], [address] [place] [address] Phone: [phone] Fax: [phone] Visits Status Start Date End Date 1 New Request 5/29/18 5/29/19 If your referral has a status of pending review or denied, additional information will be sent to support the outcome of this decision. Introducing [facility]! Dear Debbie Schulz: 
Thank you for requesting a Tujia account. Our records indicate that you already have an active Tujia account. You can access your account anytime at [email or web address] Did you know that you can access your hospital and ER discharge instructions at any time in Tujia? You can also review all of your test results from your hospital stay or ER visit. Additional Information If you have questions, please visit the Frequently Asked Questions section of the Tujia website at [email or web address] Remember, Tujia is NOT to be used for urgent needs. For medical emergencies, dial 911. Now available from your iPhone and Android! Please provide this summary of care documentation to your next provider. Your primary care clinician is listed as Mamta Steward. If you have any questions after today's visit, please call [phone].

## 2018-05-29 NOTE — PROGRESS NOTES
Subjective:   57 y.o. female for Well Woman Check. No LMP recorded. Patient is postmenopausal.  Patient reports she did follow up about the cyst that she had but decided not to have the procedure at this time because she did not want to go under general anesthesia. She reports she has been exercising more with walking and now she has added swimming. She has been trying to cut out whet products and bread. She is due to have her eyes examined and see the dentist. She is due a colonoscopy and states she has had polyps in the past. It has been about 5-6 years since her last one. She would like a new doctor for this. The last one was at [facility]. Patient Active Problem List    Diagnosis Date Noted    Subcutaneous masses, trunk 02/28/2018    Obesity, morbid ([facility] 02/07/2018    Hypertension 07/28/2015    Intracranial hemorrhage ([facility] 07/23/2015     Allergies   Allergen Reactions    Chloromycetin [Chloramphenicol Sod Succinate] Other (comments)     Anemia          ROS:  Feeling well. No dyspnea or chest pain on exertion. No abdominal pain, change in bowel habits, black or bloody stools. No urinary tract symptoms. GYN ROS: no vaginal bleeding, no discharge or pelvic pain. No neurological complaints. Objective:     Visit Vitals    /83    Pulse 75    Temp 98.4 °F (36.9 °C) (Oral)    Resp 20    Ht 5' 4\" (1.626 m)    Wt 240 lb (108.9 kg)    SpO2 98%    BMI 41.2 kg/m2     The patient appears well, alert, oriented x 3, in no distress. ENT normal.  Neck supple. No adenopathy or thyromegaly. [place]. Lungs are clear, good air entry, no wheezes, rhonchi or rales. S1 and S2 normal, no murmurs, regular rate and rhythm. Abdomen soft without tenderness, guarding, mass or organomegaly. Extremities show no edema, normal peripheral pulses. Neurological is normal, no focal findings.     BREAST EXAM: breasts appear normal, no suspicious masses, no skin or nipple changes or axillary nodes    PELVIC EXAM: exam declined by the patient    Assessment/Plan:   well woman  mammogram  pap smear  additional lab tests per orders  return annually or prn  Diagnoses and all orders for this visit:    1. Annual physical exam  -     TSH 3RD GENERATION  -     CBC WITH AUTOMATED DIFF  -     METABOLIC PANEL, COMPREHENSIVE  -     LIPID PANEL  -     VITAMIN D, 25 HYDROXY  -     HEPATITIS C AB    2. Screening mammogram, encounter for  -     [facility] MAMMO BI SCREENING INCL CAD; Future    3. Colon cancer screening  -     REFERRAL TO GASTROENTEROLOGY    4. Need for hepatitis B screening test  -     HEPATITIS C AB    5. Hyperlipidemia, unspecified hyperlipidemia type  -     atorvastatin (LIPITOR) 10 mg tablet; take 1 tablet by mouth once daily    . patient has been given a prescription to get the shingles vaccine. She has been giving referrals to the gastro doctor for colonoscopy. She states she had her TDAP vaccine back in 2013. She had it done at a [address] in [place]. She is due to have her eye exam soon. Last dental was about 3 years ago. I will contact her with the results of her labs once back. All this discussed with the patient and she understands and agrees.

## 2018-05-29 NOTE — PROGRESS NOTES
Reviewed record in preparation for visit and have obtained necessary documentation. Identified pt with two pt identifiers(name and ). Health Maintenance Due   Topic    Hepatitis C Screening     DTaP/Tdap/Td series (1 - Tdap)    PAP AKA CERVICAL CYTOLOGY     BREAST CANCER SCRN MAMMOGRAM     FOBT Q 1 YEAR AGE 54-65     ZOSTER VACCINE AGE 60>          No chief complaint on file. Wt Readings from Last 3 Encounters:   18 240 lb (108.9 kg)   18 235 lb (106.6 kg)   18 235 lb (106.6 kg)     Temp Readings from Last 3 Encounters:   18 98.4 °F (36.9 °C) (Oral)   18 99.6 °F (37.6 °C)   18 98.2 °F (36.8 °C) (Oral)     BP Readings from Last 3 Encounters:   18 143/83   18 140/80   18 149/87     Pulse Readings from Last 3 Encounters:   18 75   18 79   18 74           Learning Assessment:  :     Learning Assessment 2018   PRIMARY LEARNER Patient Patient Patient   PRIMARY LANGUAGE ENGLISH ENGLISH ENGLISH   LEARNER PREFERENCE PRIMARY VIDEOS DEMONSTRATION READING   ANSWERED BY self self self   RELATIONSHIP SELF SELF SELF       Depression Screening:  :     PHQ over the last two weeks 2018   Little interest or pleasure in doing things Not at all   Feeling down, depressed or hopeless Not at all   Total Score PHQ 2 0       Fall Risk Assessment:  :     No flowsheet data found. Abuse Screening:  :     Abuse Screening Questionnaire 2018   Do you ever feel afraid of your partner? N N N   Are you in a relationship with someone who physically or mentally threatens you? N N N   Is it safe for you to go home?  Mark Ye       Coordination of Care Questionnaire:  :     1) Have you been to an emergency room, urgent care clinic since your last visit? no   Hospitalized since your last visit? no             2) Have you seen or consulted any other health care providers outside of [address] [place] [address] since your last visit? no  (Include any pap smears or colon screenings in this section.)    3) Do you have an Advance Directive on file? no    4) Are you interested in receiving information on Advance Directives? YES      Patient is accompanied by self I have received verbal consent from Julio Cesar Davis to discuss any/all medical information while they are present in the room.

## 2018-06-06 LAB
25(OH)D3+25(OH)D2 SERPL-MCNC: 23.2 NG/ML (ref 30–100)
ALBUMIN SERPL-MCNC: 4.1 G/DL (ref 3.6–4.8)
ALBUMIN/GLOB SERPL: 1.7 {RATIO} (ref 1.2–2.2)
ALP SERPL-CCNC: 83 IU/L (ref 39–117)
ALT SERPL-CCNC: 29 IU/L (ref 0–32)
AST SERPL-CCNC: 18 IU/L (ref 0–40)
BASOPHILS # BLD AUTO: 0.1 X10E3/UL (ref 0–0.2)
BASOPHILS NFR BLD AUTO: 1 %
BILIRUB SERPL-MCNC: 0.7 MG/DL (ref 0–1.2)
BUN SERPL-MCNC: 10 MG/DL (ref 8–27)
BUN/CREAT SERPL: 14 (ref 12–28)
CALCIUM SERPL-MCNC: 10 MG/DL (ref 8.7–10.3)
CHLORIDE SERPL-SCNC: 101 MMOL/L (ref 96–106)
CHOLEST SERPL-MCNC: 227 MG/DL (ref 100–199)
CO2 SERPL-SCNC: 23 MMOL/L (ref 18–29)
CREAT SERPL-MCNC: 0.72 MG/DL (ref 0.57–1)
EOSINOPHIL # BLD AUTO: 0.2 X10E3/UL (ref 0–0.4)
EOSINOPHIL NFR BLD AUTO: 2 %
ERYTHROCYTE [DISTWIDTH] IN BLOOD BY AUTOMATED COUNT: 14 % (ref 12.3–15.4)
GFR SERPLBLD CREATININE-BSD FMLA CKD-EPI: 102 ML/MIN/1.73
GFR SERPLBLD CREATININE-BSD FMLA CKD-EPI: 89 ML/MIN/1.73
GLOBULIN SER CALC-MCNC: 2.4 G/DL (ref 1.5–4.5)
GLUCOSE SERPL-MCNC: 101 MG/DL (ref 65–99)
HCT VFR BLD AUTO: 42 % (ref 34–46.6)
HCV AB S/CO SERPL IA: <0.1 S/CO RATIO (ref 0–0.9)
HDLC SERPL-MCNC: 37 MG/DL
HGB BLD-MCNC: 14.8 G/DL (ref 11.1–15.9)
IMM GRANULOCYTES # BLD: 0 X10E3/UL (ref 0–0.1)
IMM GRANULOCYTES NFR BLD: 0 %
INTERPRETATION, 910389: NORMAL
LDLC SERPL CALC-MCNC: 136 MG/DL (ref 0–99)
LYMPHOCYTES # BLD AUTO: 2.6 X10E3/UL (ref 0.7–3.1)
LYMPHOCYTES NFR BLD AUTO: 34 %
MCH RBC QN AUTO: 31 PG (ref 26.6–33)
MCHC RBC AUTO-ENTMCNC: 35.2 G/DL (ref 31.5–35.7)
MCV RBC AUTO: 88 FL (ref 79–97)
MONOCYTES # BLD AUTO: 0.4 X10E3/UL (ref 0.1–0.9)
MONOCYTES NFR BLD AUTO: 5 %
NEUTROPHILS # BLD AUTO: 4.3 X10E3/UL (ref 1.4–7)
NEUTROPHILS NFR BLD AUTO: 58 %
PLATELET # BLD AUTO: 259 X10E3/UL (ref 150–379)
POTASSIUM SERPL-SCNC: 3.8 MMOL/L (ref 3.5–5.2)
PROT SERPL-MCNC: 6.5 G/DL (ref 6–8.5)
RBC # BLD AUTO: 4.78 X10E6/UL (ref 3.77–5.28)
SODIUM SERPL-SCNC: 141 MMOL/L (ref 134–144)
TRIGL SERPL-MCNC: 270 MG/DL (ref 0–149)
TSH SERPL DL<=0.005 MIU/L-ACNC: 1.65 UIU/ML (ref 0.45–4.5)
VLDLC SERPL CALC-MCNC: 54 MG/DL (ref 5–40)
WBC # BLD AUTO: 7.5 X10E3/UL (ref 3.4–10.8)

## 2018-06-07 NOTE — PROGRESS NOTES
Please let the patient know her cholesterol is not controlled. Is she taking the cholesterol medication? Vitamin d level is just a little low. Please advise her to take a vitamin d supplement otc.

## 2018-06-08 ENCOUNTER — TELEPHONE (OUTPATIENT)
Dept: INTERNAL MEDICINE CLINIC | Age: 63
End: 2018-06-08

## 2018-06-08 ENCOUNTER — HOSPITAL ENCOUNTER (OUTPATIENT)
Dept: MAMMOGRAPHY | Age: 63
Discharge: HOME | End: 2018-06-08
Attending: PHYSICIAN ASSISTANT
Payer: COMMERCIAL

## 2018-06-08 DIAGNOSIS — Z12.31 SCREENING MAMMOGRAM, ENCOUNTER FOR: ICD-10-CM

## 2018-06-08 PROCEDURE — 77067 SCR MAMMO BI INCL CAD: CPT

## 2018-06-08 NOTE — TELEPHONE ENCOUNTER
----- Message from Char Hou LPN sent at 8/6/3190  1:30 PM EDT -----  Patient returned call to office and writer gave lab results per Christy Ralph, patient verbalized understanding, patient states she is taking her cholesterol medication.

## 2018-06-08 NOTE — TELEPHONE ENCOUNTER
Cholesterol is not at goal. Advise her to double up her cholesterol medication. So instead of taking 10 mg of lipitor she should take 20 mg. Watch diet and start to get regular exercise.  thanks

## 2018-06-08 NOTE — PROGRESS NOTES
Patient returned call to office and writer gave lab results per Shahbaz Bud, patient verbalized understanding, patient states she is taking her cholesterol medication.

## 2018-06-08 NOTE — TELEPHONE ENCOUNTER
Writer contacted patient to inform of new instruction for medication per Araceli Blind, patient verbalized understanding.

## 2018-06-29 DIAGNOSIS — I10 ESSENTIAL HYPERTENSION: ICD-10-CM

## 2018-06-29 RX ORDER — LISINOPRIL AND HYDROCHLOROTHIAZIDE 20; 25 MG/1; MG/1
TABLET ORAL
Qty: 30 TAB | Refills: 2 | Status: SHIPPED | OUTPATIENT
Start: 2018-06-29 | End: 2018-10-04 | Stop reason: SDUPTHER

## 2018-10-04 DIAGNOSIS — I10 ESSENTIAL HYPERTENSION: ICD-10-CM

## 2018-10-04 RX ORDER — LISINOPRIL AND HYDROCHLOROTHIAZIDE 20; 25 MG/1; MG/1
TABLET ORAL
Qty: 30 TAB | Refills: 0 | Status: SHIPPED | OUTPATIENT
Start: 2018-10-04 | End: 2019-03-01 | Stop reason: SDUPTHER

## 2019-03-01 ENCOUNTER — TELEPHONE (OUTPATIENT)
Dept: INTERNAL MEDICINE CLINIC | Age: 64
End: 2019-03-01

## 2019-03-01 ENCOUNTER — OFFICE VISIT (OUTPATIENT)
Dept: INTERNAL MEDICINE CLINIC | Age: 64
End: 2019-03-01

## 2019-03-01 ENCOUNTER — HOSPITAL ENCOUNTER (OUTPATIENT)
Dept: LAB | Age: 64
Discharge: HOME | End: 2019-03-01
Payer: MEDICARE

## 2019-03-01 VITALS
WEIGHT: 232 LBS | BODY MASS INDEX: 39.61 KG/M2 | RESPIRATION RATE: 20 BRPM | SYSTOLIC BLOOD PRESSURE: 135 MMHG | DIASTOLIC BLOOD PRESSURE: 78 MMHG | HEIGHT: 64 IN | OXYGEN SATURATION: 97 % | HEART RATE: 62 BPM | TEMPERATURE: 97.6 F

## 2019-03-01 DIAGNOSIS — Z23 ENCOUNTER FOR IMMUNIZATION: ICD-10-CM

## 2019-03-01 DIAGNOSIS — I10 ESSENTIAL HYPERTENSION: Primary | ICD-10-CM

## 2019-03-01 DIAGNOSIS — Z78.0 POST-MENOPAUSAL: ICD-10-CM

## 2019-03-01 DIAGNOSIS — E55.9 VITAMIN D DEFICIENCY: ICD-10-CM

## 2019-03-01 DIAGNOSIS — E78.5 HYPERLIPIDEMIA, UNSPECIFIED HYPERLIPIDEMIA TYPE: ICD-10-CM

## 2019-03-01 DIAGNOSIS — E66.01 MORBID OBESITY WITH BMI OF 40.0-44.9, ADULT (HCC): ICD-10-CM

## 2019-03-01 DIAGNOSIS — I10 ESSENTIAL HYPERTENSION: ICD-10-CM

## 2019-03-01 DIAGNOSIS — F17.200 SMOKER: ICD-10-CM

## 2019-03-01 DIAGNOSIS — Z13.820 SCREENING FOR OSTEOPOROSIS: ICD-10-CM

## 2019-03-01 PROCEDURE — 80053 COMPREHEN METABOLIC PANEL: CPT

## 2019-03-01 PROCEDURE — 82306 VITAMIN D 25 HYDROXY: CPT

## 2019-03-01 PROCEDURE — 85025 COMPLETE CBC W/AUTO DIFF WBC: CPT

## 2019-03-01 PROCEDURE — 80061 LIPID PANEL: CPT

## 2019-03-01 PROCEDURE — 36415 COLL VENOUS BLD VENIPUNCTURE: CPT

## 2019-03-01 RX ORDER — LISINOPRIL AND HYDROCHLOROTHIAZIDE 20; 25 MG/1; MG/1
TABLET ORAL
Qty: 90 TAB | Refills: 1 | Status: SHIPPED | OUTPATIENT
Start: 2019-03-01 | End: 2019-05-31 | Stop reason: SDUPTHER

## 2019-03-01 RX ORDER — ATORVASTATIN CALCIUM 10 MG/1
TABLET, FILM COATED ORAL
Qty: 90 TAB | Refills: 1 | Status: SHIPPED | OUTPATIENT
Start: 2019-03-01 | End: 2019-08-16 | Stop reason: SDUPTHER

## 2019-03-01 RX ORDER — ALBUTEROL SULFATE 90 UG/1
2 AEROSOL, METERED RESPIRATORY (INHALATION)
Qty: 3 INHALER | Refills: 3 | Status: ON HOLD | OUTPATIENT
Start: 2019-03-01 | End: 2019-07-26

## 2019-03-01 RX ORDER — BUPROPION HYDROCHLORIDE 150 MG/1
TABLET, EXTENDED RELEASE ORAL
Qty: 60 TAB | Refills: 0 | Status: SHIPPED | OUTPATIENT
Start: 2019-03-01 | End: 2019-04-04 | Stop reason: SDUPTHER

## 2019-03-01 NOTE — PATIENT INSTRUCTIONS
Body Mass Index: Care Instructions  Your Care Instructions    Body mass index (BMI) can help you see if your weight is raising your risk for health problems. It uses a formula to compare how much you weigh with how tall you are. · A BMI lower than 18.5 is considered underweight. · A BMI between 18.5 and 24.9 is considered healthy. · A BMI between 25 and 29.9 is considered overweight. A BMI of 30 or higher is considered obese. If your BMI is in the normal range, it means that you have a lower risk for weight-related health problems. If your BMI is in the overweight or obese range, you may be at increased risk for weight-related health problems, such as high blood pressure, heart disease, stroke, arthritis or joint pain, and diabetes. If your BMI is in the underweight range, you may be at increased risk for health problems such as fatigue, lower protection (immunity) against illness, muscle loss, bone loss, hair loss, and hormone problems. BMI is just one measure of your risk for weight-related health problems. You may be at higher risk for health problems if you are not active, you eat an unhealthy diet, or you drink too much alcohol or use tobacco products. Follow-up care is a key part of your treatment and safety. Be sure to make and go to all appointments, and call your doctor if you are having problems. It's also a good idea to know your test results and keep a list of the medicines you take. How can you care for yourself at home? · Practice healthy eating habits. This includes eating plenty of fruits, vegetables, whole grains, lean protein, and low-fat dairy. · If your doctor recommends it, get more exercise. Walking is a good choice. Bit by bit, increase the amount you walk every day. Try for at least 30 minutes on most days of the week. · Do not smoke. Smoking can increase your risk for health problems. If you need help quitting, talk to your doctor about stop-smoking programs and medicines. These can increase your chances of quitting for good. · Limit alcohol to 2 drinks a day for men and 1 drink a day for women. Too much alcohol can cause health problems. If you have a BMI higher than 25  · Your doctor may do other tests to check your risk for weight-related health problems. This may include measuring the distance around your waist. A waist measurement of more than 40 inches in men or 35 inches in women can increase the risk of weight-related health problems. · Talk with your doctor about steps you can take to stay healthy or improve your health. You may need to make lifestyle changes to lose weight and stay healthy, such as changing your diet and getting regular exercise. If you have a BMI lower than 18.5  · Your doctor may do other tests to check your risk for health problems. · Talk with your doctor about steps you can take to stay healthy or improve your health. You may need to make lifestyle changes to gain or maintain weight and stay healthy, such as getting more healthy foods in your diet and doing exercises to build muscle. Where can you learn more? Go to http://kaykay-lucas.info/. Enter S176 in the search box to learn more about \"Body Mass Index: Care Instructions. \"  Current as of: October 13, 2016  Content Version: 11.4  © 3238-4868 [employer]. Care instructions adapted under license by [employer] (which disclaims liability or warranty for this information). If you have questions about a medical condition or this instruction, always ask your healthcare professional. [address] any warranty or liability for your use of this information.

## 2019-03-01 NOTE — PROGRESS NOTES
Subjective:     Carlos Sánchez is a 70 y.o. female who presents for follow up of hypertension and hyperlipidemia. Diet and Lifestyle: patient states that she is primarily eating fish and chicken. Home BP Monitoring: is not measured at home    Cardiovascular ROS: taking medications as instructed, no medication side effects noted, no TIA's, no chest pain on exertion, no dyspnea on exertion, no swelling of ankles. New concerns: need labs filled. She states she has been trying to quit smoking. She admits she is having some issues with this. Her stress level is better. Her daughter that has been a challenge has moved out. She has not been able to get her colonoscopy yet. At that time she was having some problems with hemorrhoids. She states she has been eating more of plant base diet with some chicken, eggs and fish. No red meat. This has been going on for about one month. Patient Active Problem List    Diagnosis Date Noted    Subcutaneous masses, trunk 02/28/2018    Obesity, morbid ([facility] [address] 02/07/2018    Hypertension 07/28/2015    Intracranial hemorrhage ([facility] [address] 07/23/2015     Allergies   Allergen Reactions    Chloromycetin [Chloramphenicol Sod Succinate] Other (comments)     Anemia               Review of Systems, additional:  Pertinent items are noted in HPI. Objective:     Visit Vitals  /78   Pulse 62   Temp 97.6 °F (36.4 °C) (Oral)   Resp 20   Ht 5' 4\" (1.626 m)   Wt 232 lb (105.2 kg)   SpO2 97%   BMI 39.82 kg/m²     Appearance: alert, well appearing, and in no distress and overweight. General exam: CVS exam BP noted to be well controlled today in office, S1, S2 normal, no gallop, no murmur, chest clear, no JVD, no HSM, mild peripheral edema. Lab review: orders written for new lab studies as appropriate; see orders. Assessment/Plan:     hypertension reasonably well controlled. current treatment plan is effective, no change in therapy. Diagnoses and all orders for this visit:    1. Essential hypertension  -     CBC WITH AUTOMATED DIFF  -     METABOLIC PANEL, COMPREHENSIVE    2. Hyperlipidemia, unspecified hyperlipidemia type  -     CBC WITH AUTOMATED DIFF  -     METABOLIC PANEL, COMPREHENSIVE  -     LIPID PANEL    3. Vitamin D deficiency  -     CBC WITH AUTOMATED DIFF  -     VITAMIN D, 25 HYDROXY  -     DEXA BONE DENSITY STUDY AXIAL; Future    4. Morbid obesity with BMI of 40.0-44.9, adult ([facility] [address]    5. Encounter for immunization  -     varicella-zoster recombinant, PF, (SHINGRIX, PF,) 50 mcg/0.5 mL susr injection; 0.5 mL by IntraMUSCular route once for 1 dose. Repeat one in 2-6 months    6. Screening for osteoporosis  -     DEXA BONE DENSITY STUDY AXIAL; Future    7. Smoker  -     buPROPion SR (WELLBUTRIN SR) 150 mg SR tablet; Take one tablet daily for 3 days, then increase to one tablet twice a day. Separate doses by at least 8 hours, last dose no later than 6 pm; stop smoking after 5-7 days    keep up the good work with diet and exercise. Down 8 pounds since the last visit. She will be contact with the results of her labs once back. Advised her to contact me in a couple of weeks to let me know how she is doing with the smoking cessation. All this discussed with the patient and she understands and agrees. Discussed the patient's BMI with her. The BMI follow up plan is as follows:     dietary management education, guidance, and counseling  encourage exercise  monitor weight  prescribed dietary intake    An After Visit Summary was printed and given to the patient.

## 2019-03-02 ENCOUNTER — TELEPHONE (OUTPATIENT)
Dept: INTERNAL MEDICINE CLINIC | Age: 64
End: 2019-03-02

## 2019-03-02 DIAGNOSIS — E55.9 VITAMIN D DEFICIENCY: Primary | ICD-10-CM

## 2019-03-02 LAB
25(OH)D3+25(OH)D2 SERPL-MCNC: 20.4 NG/ML (ref 30–100)
ALBUMIN SERPL-MCNC: 4.5 G/DL (ref 3.6–4.8)
ALBUMIN/GLOB SERPL: 2 {RATIO} (ref 1.2–2.2)
ALP SERPL-CCNC: 101 IU/L (ref 39–117)
ALT SERPL-CCNC: 21 IU/L (ref 0–32)
AST SERPL-CCNC: 19 IU/L (ref 0–40)
BASOPHILS # BLD AUTO: 0 X10E3/UL (ref 0–0.2)
BASOPHILS NFR BLD AUTO: 1 %
BILIRUB SERPL-MCNC: 0.7 MG/DL (ref 0–1.2)
BUN SERPL-MCNC: 10 MG/DL (ref 8–27)
BUN/CREAT SERPL: 13 (ref 12–28)
CALCIUM SERPL-MCNC: 10.1 MG/DL (ref 8.7–10.3)
CHLORIDE SERPL-SCNC: 101 MMOL/L (ref 96–106)
CHOLEST SERPL-MCNC: 178 MG/DL (ref 100–199)
CO2 SERPL-SCNC: 23 MMOL/L (ref 20–29)
CREAT SERPL-MCNC: 0.76 MG/DL (ref 0.57–1)
EOSINOPHIL # BLD AUTO: 0.2 X10E3/UL (ref 0–0.4)
EOSINOPHIL NFR BLD AUTO: 2 %
ERYTHROCYTE [DISTWIDTH] IN BLOOD BY AUTOMATED COUNT: 13.9 % (ref 12.3–15.4)
GLOBULIN SER CALC-MCNC: 2.3 G/DL (ref 1.5–4.5)
GLUCOSE SERPL-MCNC: 104 MG/DL (ref 65–99)
HCT VFR BLD AUTO: 41.5 % (ref 34–46.6)
HDLC SERPL-MCNC: 35 MG/DL
HGB BLD-MCNC: 14.9 G/DL (ref 11.1–15.9)
IMM GRANULOCYTES # BLD AUTO: 0 X10E3/UL (ref 0–0.1)
IMM GRANULOCYTES NFR BLD AUTO: 0 %
INTERPRETATION, 910389: NORMAL
LDLC SERPL CALC-MCNC: 111 MG/DL (ref 0–99)
LYMPHOCYTES # BLD AUTO: 2.7 X10E3/UL (ref 0.7–3.1)
LYMPHOCYTES NFR BLD AUTO: 35 %
MCH RBC QN AUTO: 30.9 PG (ref 26.6–33)
MCHC RBC AUTO-ENTMCNC: 35.9 G/DL (ref 31.5–35.7)
MCV RBC AUTO: 86 FL (ref 79–97)
MONOCYTES # BLD AUTO: 0.5 X10E3/UL (ref 0.1–0.9)
MONOCYTES NFR BLD AUTO: 6 %
NEUTROPHILS # BLD AUTO: 4.3 X10E3/UL (ref 1.4–7)
NEUTROPHILS NFR BLD AUTO: 56 %
PLATELET # BLD AUTO: 292 X10E3/UL (ref 150–379)
POTASSIUM SERPL-SCNC: 4.1 MMOL/L (ref 3.5–5.2)
PROT SERPL-MCNC: 6.8 G/DL (ref 6–8.5)
RBC # BLD AUTO: 4.82 X10E6/UL (ref 3.77–5.28)
SODIUM SERPL-SCNC: 141 MMOL/L (ref 134–144)
TRIGL SERPL-MCNC: 161 MG/DL (ref 0–149)
VLDLC SERPL CALC-MCNC: 32 MG/DL (ref 5–40)
WBC # BLD AUTO: 7.6 X10E3/UL (ref 3.4–10.8)

## 2019-03-02 RX ORDER — ERGOCALCIFEROL 1.25 MG/1
50000 CAPSULE ORAL
Qty: 6 CAP | Refills: 0 | Status: ON HOLD | OUTPATIENT
Start: 2019-03-02 | End: 2019-07-26

## 2019-03-05 ENCOUNTER — OFFICE VISIT (OUTPATIENT)
Dept: INTERNAL MEDICINE CLINIC | Age: 64
End: 2019-03-05

## 2019-03-05 VITALS
OXYGEN SATURATION: 97 % | DIASTOLIC BLOOD PRESSURE: 80 MMHG | RESPIRATION RATE: 20 BRPM | HEART RATE: 78 BPM | HEIGHT: 64 IN | TEMPERATURE: 98.4 F | BODY MASS INDEX: 39.44 KG/M2 | SYSTOLIC BLOOD PRESSURE: 135 MMHG | WEIGHT: 231 LBS

## 2019-03-05 DIAGNOSIS — F17.200 SMOKER: ICD-10-CM

## 2019-03-05 DIAGNOSIS — Z00.00 INITIAL MEDICARE ANNUAL WELLNESS VISIT: Primary | ICD-10-CM

## 2019-03-05 DIAGNOSIS — I10 ESSENTIAL HYPERTENSION: ICD-10-CM

## 2019-03-05 DIAGNOSIS — R94.31 ABNORMAL EKG: ICD-10-CM

## 2019-03-05 DIAGNOSIS — E78.5 HYPERLIPIDEMIA, UNSPECIFIED HYPERLIPIDEMIA TYPE: ICD-10-CM

## 2019-03-05 NOTE — PROGRESS NOTES
This is a \"Welcome to [employer]"  Initial Preventive Physical Examination (IPPE) providing Personalized Prevention Plan Services (Performed in the first 12 months of enrollment)    I have reviewed the patient's medical history in detail and updated the computerized patient record. History     Past Medical History:   Diagnosis Date    High cholesterol     Hypertension     Ill-defined condition 2015    frontal lobe bleed    Musculoskeletal disorder       Past Surgical History:   Procedure Laterality Date    HX ABDOMINAL LAPAROSCOPY      HX APPENDECTOMY      HX TONSILLECTOMY      HX TUBAL LIGATION  1989     Current Outpatient Medications   Medication Sig Dispense Refill    ergocalciferol (ERGOCALCIFEROL) 50,000 unit capsule Take 1 Cap by mouth every seven (7) days. 6 Cap 0    albuterol (PROVENTIL HFA, VENTOLIN HFA, PROAIR HFA) 90 mcg/actuation inhaler Take 2 Puffs by inhalation every four (4) hours as needed for Wheezing. 3 Inhaler 3    atorvastatin (LIPITOR) 10 mg tablet take 1 tablet by mouth once daily 90 Tab 1    lisinopril-hydroCHLOROthiazide (PRINZIDE, ZESTORETIC) 20-25 mg per tablet take 1 tablet by mouth once daily 90 Tab 1    buPROPion SR (WELLBUTRIN SR) 150 mg SR tablet Take one tablet daily for 3 days, then increase to one tablet twice a day. Separate doses by at least 8 hours, last dose no later than 6 pm; stop smoking after 5-7 days 60 Tab 0    diphenhydrAMINE (BENADRYL) 25 mg capsule Take 25 mg by mouth every four (4) hours as needed.  MAGNESIUM CITRATE PO Take 1,000 Caps by mouth daily.  EPINEPHrine (EPIPEN 2-ASHLEY) 0.3 mg/0.3 mL (1:1,000) injection 0.3 mL by IntraMUSCular route once as needed for up to 1 dose. 1 Syringe 2    acetaminophen (TYLENOL) 325 mg tablet Take 325 mg by mouth as needed for Pain. Indications: PAIN, head      aspirin 81 mg chewable tablet Take 81 mg by mouth daily.  Indications: heart palpitations       Allergies   Allergen Reactions    Chloromycetin [Chloramphenicol Sod Succinate] Other (comments)     Anemia       Family History   Problem Relation Age of Onset    Heart Disease Mother     Hypertension Mother     Diabetes Mother     Heart Disease Father     Diabetes Father     Hypertension Father      Social History     Tobacco Use    Smoking status: Current Every Day Smoker     Packs/day: 0.50     Years: 42.00     Pack years: 21.00     Types: Cigarettes     Last attempt to quit: 5/19/2015     Years since quitting: 3.7    Smokeless tobacco: Never Used   Substance Use Topics    Alcohol use: Yes     Comment: occasional     Diet, Lifestyle: she has cut red meat. Eating more plant based foods and chicken fish, eggs    Exercise level: moderately active walking 10,000 plus steps a day, started yoga. Depression Risk Screen     3 most recent PHQ Screens 3/5/2019   Little interest or pleasure in doing things Not at all   Feeling down, depressed, irritable, or hopeless Not at all   Total Score PHQ 2 0     Alcohol Risk Screen   You do not drink alcohol or very rarely. Functional Ability and Level of Safety   Hearing Loss  Hearing is a little diminished. History of ear infections as a child    Vision Screening  Vision is good. No exam data present    beginning stages of cataract. But vision is good with glasses  Activities of Daily Living  The home contains: no safety equipment. Patient does total self care    Fall Risk Screen  Fall Risk Assessment, last 12 mths 3/5/2019   Able to walk? Yes   Fall in past 12 months? No       Abuse Screen  Patient is not abused  patient feels safe at home    Screening EKG   EKG order placed: Yes    Patient Care Team   Patient Care Team:  Manuela Jj PA-C as PCP - General (Physician Assistant)     End of Life Planning   Advanced care planning directives were discussed with the patient and /or family/caregiver.      Assessment/Plan   Education and counseling provided:  Are appropriate based on today's review and evaluation  the patient states she is ready to start her smoker cessation program. she is just waiting for the pharmacy to get her prescription in. Diagnoses and all orders for this visit:    1. Initial Medicare annual wellness visit    2. Smoker  -     REFERRAL TO CARDIOLOGY    3. Essential hypertension  -     REFERRAL TO CARDIOLOGY    4. Hyperlipidemia, unspecified hyperlipidemia type  -     REFERRAL TO CARDIOLOGY    5. Abnormal EKG  -     REFERRAL TO CARDIOLOGY      Orders were placed at her last visit for shingrix and her bone density. [place] and we are in the process of trying to find that for her chart. She has a referral to see gastro for a colonoscopy.     Health Maintenance Due   Topic Date Due    DTaP/Tdap/Td series (1 - Tdap) 10/25/1974    Shingrix Vaccine Age 50> (1 of 2) 10/25/2003    FOBT Q 1 YEAR AGE 50-75  10/25/2003    Influenza Age 5 to Adult  08/01/2018    GLAUCOMA SCREENING Q2Y  10/25/2018    Bone Densitometry (Dexa) Screening  10/25/2018    Pneumococcal 65+ Low/Medium Risk (1 of 2 - PCV13) 10/25/2018    MEDICARE YEARLY EXAM  03/01/2019

## 2019-03-05 NOTE — PATIENT INSTRUCTIONS
Medicare Wellness Visit, Female     The best way to live healthy is to have a lifestyle where you eat a well-balanced diet, exercise regularly, limit alcohol use, and quit all forms of tobacco/nicotine, if applicable. Regular preventive services are another way to keep healthy. Preventive services (vaccines, screening tests, monitoring & exams) can help personalize your care plan, which helps you manage your own care. Screening tests can find health problems at the earliest stages, when they are easiest to treat. [facility] follows the current, evidence-based guidelines published by the [facility] Mike Kennedy ([facility]) when recommending preventive services for our patients. Because we follow these guidelines, sometimes recommendations change over time as research supports it. (For example, mammograms used to be recommended annually. Even though Medicare will still pay for an annual mammogram, the newer guidelines recommend a mammogram every two years for women of average risk.)  Of course, you and your doctor may decide to screen more often for some diseases, based on your risk and your health status. Preventive services for you include:  - Medicare offers their members a free annual wellness visit, which is time for you and your primary care provider to discuss and plan for your preventive service needs. Take advantage of this benefit every year!  -All adults over the age of 72 should receive the recommended pneumonia vaccines. Current USPSTF guidelines recommend a series of two vaccines for the best pneumonia protection.   -All adults should have a flu vaccine yearly and a tetanus vaccine every 10 years. All adults age 61 and older should receive a shingles vaccine once in their lifetime.    -A bone mass density test is recommended when a woman turns 65 to screen for osteoporosis. This test is only recommended one time, as a screening.  Some providers will use this same test as a disease monitoring tool if you already have osteoporosis. -All adults age 38-68 who are overweight should have a diabetes screening test once every three years.   -Other screening tests and preventive services for persons with diabetes include: an eye exam to screen for diabetic retinopathy, a kidney function test, a foot exam, and stricter control over your cholesterol.   -Cardiovascular screening for adults with routine risk involves an electrocardiogram (ECG) at intervals determined by your doctor.   -Colorectal cancer screenings should be done for adults age 54-65 with no increased risk factors for colorectal cancer. There are a number of acceptable methods of screening for this type of cancer. Each test has its own benefits and drawbacks. Discuss with your doctor what is most appropriate for you during your annual wellness visit. The different tests include: colonoscopy (considered the best screening method), a fecal occult blood test, a fecal DNA test, and sigmoidoscopy. -Breast cancer screenings are recommended every other year for women of normal risk, age 54-69.  -Cervical cancer screenings for women over age 72 are only recommended with certain risk factors.   -All adults born between [facility] should be screened once for Hepatitis C.      Here is a list of your current Health Maintenance items (your personalized list of preventive services) with a due date:  Health Maintenance Due   Topic Date Due    DTaP/Tdap/Td  (1 - Tdap) 10/25/1974    Shingles Vaccine (1 of 2) 10/25/2003    Stool testing for trace blood  10/25/2003    Flu Vaccine  08/01/2018    Glaucoma Screening   10/25/2018    Bone Mineral Density   10/25/2018    Pneumococcal Vaccine (1 of 2 - PCV13) 10/25/2018    Annual Well Visit  03/01/2019

## 2019-03-06 NOTE — ACP (ADVANCE CARE PLANNING)
The patient plans on discussing this with her son this week. She plans on bringing back the form at her next visit.

## 2019-03-07 ENCOUNTER — TELEPHONE (OUTPATIENT)
Dept: INTERNAL MEDICINE CLINIC | Age: 64
End: 2019-03-07

## 2019-03-18 ENCOUNTER — TELEPHONE (OUTPATIENT)
Dept: INTERNAL MEDICINE CLINIC | Age: 64
End: 2019-03-18

## 2019-03-18 NOTE — TELEPHONE ENCOUNTER
Can you please check on getting her scheduled for her bone density test. They have not contacted her yet.  thanks

## 2019-03-26 ENCOUNTER — CLINICAL SUPPORT (OUTPATIENT)
Dept: CARDIOLOGY CLINIC | Age: 64
End: 2019-03-26

## 2019-03-26 ENCOUNTER — HOSPITAL ENCOUNTER (OUTPATIENT)
Dept: MAMMOGRAPHY | Age: 64
Discharge: HOME | End: 2019-03-26
Attending: PHYSICIAN ASSISTANT
Payer: MEDICARE

## 2019-03-26 ENCOUNTER — OFFICE VISIT (OUTPATIENT)
Dept: CARDIOLOGY CLINIC | Age: 64
End: 2019-03-26

## 2019-03-26 VITALS
HEART RATE: 66 BPM | WEIGHT: 239.6 LBS | BODY MASS INDEX: 40.9 KG/M2 | DIASTOLIC BLOOD PRESSURE: 74 MMHG | HEIGHT: 64 IN | SYSTOLIC BLOOD PRESSURE: 122 MMHG | RESPIRATION RATE: 16 BRPM

## 2019-03-26 DIAGNOSIS — R06.09 DOE (DYSPNEA ON EXERTION): Primary | ICD-10-CM

## 2019-03-26 DIAGNOSIS — Z13.820 SCREENING FOR OSTEOPOROSIS: ICD-10-CM

## 2019-03-26 DIAGNOSIS — Z78.0 POST-MENOPAUSAL: ICD-10-CM

## 2019-03-26 DIAGNOSIS — E55.9 VITAMIN D DEFICIENCY: ICD-10-CM

## 2019-03-26 DIAGNOSIS — R94.31 ABNORMAL EKG: ICD-10-CM

## 2019-03-26 DIAGNOSIS — R00.2 PALPITATIONS: Primary | ICD-10-CM

## 2019-03-26 PROCEDURE — 77080 DXA BONE DENSITY AXIAL: CPT

## 2019-03-26 NOTE — Clinical Note
Thanks for sending her, everything looked ok, she completed her stress echo and can follow-up here prn. She quit smoking- YAY!

## 2019-03-26 NOTE — PROGRESS NOTES
[place]: Christophe Pa  [phone]  Requesting/referring provider: AMADOR Darnell  Reason for Consult: abnormal EKG    HPI: Christos Shahid, a 70y.o. year-old who presents for evaluation of abnormal EKG, She has had an abnormal ekg her whole life, previously seen by Dr. Heena Bhatt, in 2006 and had palpitations and stress testing and a cardiac cath. She was told to quit smoking but she did not quit until this month. Her heart palps have disappeared since she quit smoking. Discussed palpitation triggers including nicotine and other stimulants. She has exertional dyspnea that is significant, walks on flat ground and feels like it is not really progressive recently. No chest pain. Resting HR 60 by fitbit  Concern for CAD given exertional dyspnea and smoking    Assessment/Plan:  1. Dyslipidemia-  Last  recheck in 6 weeks after diet, exercise, lifestyle modification ongoing. Cont atorvastatin  2. Body mass index is 41.13 kg/m². work on diet and exercise and lifestyle modification, recently down 14 pounds with diet and getting her 10k steps. 3. Tobacco use- recently quit, continue cessation. 4. TIA in 2005, fell in the bathtub, broke her toe and had aphasia  5. Palptiations/irregular heartbeat- high risk for afib, concern for anti plt or oac due to prior ich, do loop if it returns  6. IGT - mild, glucose 104, no A1C to review  7. ICH- small hematoma after the shower curtain fel on her head by Ct 2015, resolved by 2016, still has headache related to weather. 8. Vit D 20.4 low, on repletion    Fhx mother with MI at 64, CABG, dad with essential thrombocytosis  Soc recently quit tobacco, no etoh  She  has a past medical history of High cholesterol, Hypertension, Ill-defined condition (2015), and Musculoskeletal disorder.     Cardiovascular ROS: positive for - dyspnea on exertion  Respiratory ROS: positive for - shortness of breath  Neurological ROS: no TIA or stroke symptoms  All other systems negative except as above. PE  Vitals:    03/26/19 0850   BP: 122/74   Pulse: 66   Resp: 16   Weight: 239 lb 9.6 oz (108.7 kg)   Height: 5' 4\" (1.626 m)    Body mass index is 41.13 kg/m².    General appearance - alert, well appearing, and in no distress  Mental status - affect appropriate to mood  Eyes - sclera anicteric, moist mucous membranes  Neck - supple, no significant adenopathy  Lymphatics - no  lymphadenopathy  Chest - clear to auscultation, no wheezes, rales or rhonchi  Heart - normal rate, regular rhythm, normal S1, S2, no murmurs, rubs, clicks or gallops  Abdomen - soft, nontender, nondistended, no masses or organomegaly  Back exam - full range of motion, no tenderness  Neurological - cranial nerves II through XII grossly intact, no focal deficit  Musculoskeletal - no muscular tenderness noted, normal strength  Extremities - peripheral pulses normal, no pedal edema  Skin - normal coloration  no rashes    Recent Labs:  Lab Results   Component Value Date/Time    Cholesterol, total 178 03/01/2019 08:53 AM    HDL Cholesterol 35 (L) 03/01/2019 08:53 AM    LDL, calculated 111 (H) 03/01/2019 08:53 AM    Triglyceride 161 (H) 03/01/2019 08:53 AM     Lab Results   Component Value Date/Time    Creatinine (POC) 0.6 03/22/2011 12:23 PM    Creatinine 0.76 03/01/2019 08:53 AM     Lab Results   Component Value Date/Time    BUN 10 03/01/2019 08:53 AM    BUN (POC) 14 03/22/2011 12:23 PM     Lab Results   Component Value Date/Time    Potassium 4.1 03/01/2019 08:53 AM     No results found for: HBA1C, HGBE8, [id number]  Lab Results   Component Value Date/Time    Hemoglobin (POC) 15.0 03/22/2011 12:23 PM    HGB 14.9 03/01/2019 08:53 AM     Lab Results   Component Value Date/Time    PLATELET 858 03/33/4814 08:53 AM       Reviewed:  Past Medical History:   Diagnosis Date    High cholesterol     Hypertension     Ill-defined condition 2015    frontal lobe bleed    Musculoskeletal disorder      Social History     Tobacco Use Smoking Status Former Smoker    Packs/day: 0.50    Years: 42.00    Pack years: 21.00    Types: Cigarettes    Last attempt to quit: 3/11/2019    Years since quittin.0   Smokeless Tobacco Never Used     Social History     Substance and Sexual Activity   Alcohol Use Yes    Frequency: Monthly or less    Drinks per session: 1 or 2    Binge frequency: Never    Comment: occasional     Allergies   Allergen Reactions    Chloromycetin [Chloramphenicol Sod Succinate] Other (comments)     Anemia         Current Outpatient Medications   Medication Sig    ergocalciferol (ERGOCALCIFEROL) 50,000 unit capsule Take 1 Cap by mouth every seven (7) days.  albuterol (PROVENTIL HFA, VENTOLIN HFA, PROAIR HFA) 90 mcg/actuation inhaler Take 2 Puffs by inhalation every four (4) hours as needed for Wheezing.  atorvastatin (LIPITOR) 10 mg tablet take 1 tablet by mouth once daily    lisinopril-hydroCHLOROthiazide (PRINZIDE, ZESTORETIC) 20-25 mg per tablet take 1 tablet by mouth once daily    buPROPion SR (WELLBUTRIN SR) 150 mg SR tablet Take one tablet daily for 3 days, then increase to one tablet twice a day. Separate doses by at least 8 hours, last dose no later than 6 pm; stop smoking after 5-7 days    diphenhydrAMINE (BENADRYL) 25 mg capsule Take 25 mg by mouth every four (4) hours as needed.  MAGNESIUM CITRATE PO Take 1,000 Caps by mouth daily.  EPINEPHrine (EPIPEN 2-ASHLEY) 0.3 mg/0.3 mL (1:1,000) injection 0.3 mL by IntraMUSCular route once as needed for up to 1 dose.  acetaminophen (TYLENOL) 325 mg tablet Take 325 mg by mouth as needed for Pain. Indications: PAIN, head    aspirin 81 mg chewable tablet Take 81 mg by mouth daily. Indications: heart palpitations     No current facility-administered medications for this visit.         Page Wright MD  [facility] [place]  [address] [facility] [address]  [facility], [address]

## 2019-03-28 DIAGNOSIS — R06.09 DOE (DYSPNEA ON EXERTION): Primary | ICD-10-CM

## 2019-03-28 DIAGNOSIS — R94.31 ABNORMAL EKG: ICD-10-CM

## 2019-03-29 ENCOUNTER — TELEPHONE (OUTPATIENT)
Dept: CARDIOLOGY CLINIC | Age: 64
End: 2019-03-29

## 2019-03-29 NOTE — TELEPHONE ENCOUNTER
Two patient identifiers verified. Per MD patient called and given results of recent stress echo. Patient verbalized understanding and denies any further questions or concerns at this time.

## 2019-03-29 NOTE — TELEPHONE ENCOUNTER
Attempted to reach patient by telephone to give her results of recent stress echo. A message was left for return call.

## 2019-04-04 DIAGNOSIS — F17.200 SMOKER: ICD-10-CM

## 2019-04-04 RX ORDER — BUPROPION HYDROCHLORIDE 150 MG/1
TABLET, EXTENDED RELEASE ORAL
Qty: 60 TAB | Refills: 0 | Status: SHIPPED | OUTPATIENT
Start: 2019-04-04 | End: 2019-05-31 | Stop reason: SDUPTHER

## 2019-04-16 ENCOUNTER — DOCUMENTATION ONLY (OUTPATIENT)
Dept: CARDIOLOGY CLINIC | Age: 64
End: 2019-04-16

## 2019-04-16 ENCOUNTER — TELEPHONE (OUTPATIENT)
Dept: CARDIOLOGY CLINIC | Age: 64
End: 2019-04-16

## 2019-04-16 NOTE — TELEPHONE ENCOUNTER
Per Dr. Cipriano Winn please call patient about her junctional rhythm and dizziness and ask her to see Dr. Octavio Diehl for evaluation, may need a pacemaker. LVM for patient to return call at earliest convenience. Patient returned call and above loop monitor results given.      Future Appointments   Date Time Provider Margi Fountain   5/14/2019  9:00 AM Florentin Pugh  [address]     2 pt identifiers used

## 2019-04-16 NOTE — PROGRESS NOTES
Per Dr. Radha Mccabe please call patient about her junctional rhythm and dizziness and ask her to see Dr. Portia Bhandari for evaluation, may need a pacemaker.

## 2019-04-19 ENCOUNTER — DOCUMENTATION ONLY (OUTPATIENT)
Dept: CARDIOLOGY CLINIC | Age: 64
End: 2019-04-19

## 2019-04-19 NOTE — PROGRESS NOTES
Patient has a scheduled appt with Dr. Harpal Mccoy:    Future Appointments   Date Time Provider Margi Palmi   5/14/2019  9:00 AM Wil Beckwith  [address]

## 2019-05-14 ENCOUNTER — OFFICE VISIT (OUTPATIENT)
Dept: CARDIOLOGY CLINIC | Age: 64
End: 2019-05-14

## 2019-05-14 VITALS
HEART RATE: 59 BPM | RESPIRATION RATE: 18 BRPM | WEIGHT: 230 LBS | DIASTOLIC BLOOD PRESSURE: 70 MMHG | SYSTOLIC BLOOD PRESSURE: 110 MMHG | OXYGEN SATURATION: 98 % | HEIGHT: 64 IN | BODY MASS INDEX: 39.27 KG/M2

## 2019-05-14 DIAGNOSIS — R00.2 PALPITATIONS: ICD-10-CM

## 2019-05-14 DIAGNOSIS — I49.3 PVC (PREMATURE VENTRICULAR CONTRACTION): ICD-10-CM

## 2019-05-14 DIAGNOSIS — E66.01 OBESITY, MORBID (HCC): ICD-10-CM

## 2019-05-14 DIAGNOSIS — R00.1 SINUS BRADYCARDIA: ICD-10-CM

## 2019-05-14 DIAGNOSIS — I49.1 PAC (PREMATURE ATRIAL CONTRACTION): ICD-10-CM

## 2019-05-14 DIAGNOSIS — R42 DIZZINESS: ICD-10-CM

## 2019-05-14 DIAGNOSIS — I10 ESSENTIAL HYPERTENSION: ICD-10-CM

## 2019-05-14 DIAGNOSIS — Z01.818 PRE-OP TESTING: ICD-10-CM

## 2019-05-14 DIAGNOSIS — I49.5 SICK SINUS SYNDROME (HCC): Primary | ICD-10-CM

## 2019-05-14 DIAGNOSIS — R06.09 DOE (DYSPNEA ON EXERTION): ICD-10-CM

## 2019-05-14 RX ORDER — GLUCOSAMINE SULFATE 1500 MG
5000 POWDER IN PACKET (EA) ORAL DAILY
COMMUNITY
End: 2020-03-04

## 2019-05-14 RX ORDER — CHLORHEXIDINE GLUCONATE 4 G/100ML
SOLUTION TOPICAL
Qty: 1 BOTTLE | Refills: 0 | Status: SHIPPED | OUTPATIENT
Start: 2019-05-14 | End: 2019-07-26

## 2019-05-14 NOTE — PROGRESS NOTES
Cardiac Electrophysiology OFFICE Consultation Note     Subjective:      Juno Coronado is a 70 y.o. patient who is seen for evaluation of  Sinus bradycardia and sick sinus syndrome. The patient has PACs, PVCs on the 30-day loop monitor but she also had episodes of sinus bradycardia and junctional rhythm that was associated with a dizzy spell. The patient cannot tolerate medication. There is no family history of pacemaker. She is kindly referred from Dr. Fred Carrillo. Stress echo 3/28/2019 no ischemia    ECG sinus bradycardia     Patient Active Problem List   Diagnosis Code    Intracranial hemorrhage (HCC) I62.9    Hypertension I10    Obesity, morbid ([facility] [address] E66.01    Subcutaneous masses, trunk R22.9     Current Outpatient Medications   Medication Sig Dispense Refill    cholecalciferol (VITAMIN D3) 1,000 unit cap Take 5,000 Units by mouth daily.  buPROPion SR (WELLBUTRIN SR) 150 mg SR tablet Take one tablet daily for 3 days, then increase to one tablet twice a day. Separate doses by at least 8 hours, last dose no later than 6 pm; stop smoking after 5-7 days 60 Tab 0    ergocalciferol (ERGOCALCIFEROL) 50,000 unit capsule Take 1 Cap by mouth every seven (7) days. 6 Cap 0    albuterol (PROVENTIL HFA, VENTOLIN HFA, PROAIR HFA) 90 mcg/actuation inhaler Take 2 Puffs by inhalation every four (4) hours as needed for Wheezing. 3 Inhaler 3    atorvastatin (LIPITOR) 10 mg tablet take 1 tablet by mouth once daily 90 Tab 1    lisinopril-hydroCHLOROthiazide (PRINZIDE, ZESTORETIC) 20-25 mg per tablet take 1 tablet by mouth once daily 90 Tab 1    diphenhydrAMINE (BENADRYL) 25 mg capsule Take 25 mg by mouth every four (4) hours as needed.  MAGNESIUM CITRATE PO Take 1,000 Caps by mouth daily.  EPINEPHrine (EPIPEN 2-ASHLEY) 0.3 mg/0.3 mL (1:1,000) injection 0.3 mL by IntraMUSCular route once as needed for up to 1 dose.  1 Syringe 2    acetaminophen (TYLENOL) 325 mg tablet Take 325 mg by mouth as needed for Pain. Indications: PAIN, head      aspirin 81 mg chewable tablet Take 81 mg by mouth daily. Indications: heart palpitations       Allergies   Allergen Reactions    Chloromycetin [Chloramphenicol Sod Succinate] Other (comments)     Anemia       Past Medical History:   Diagnosis Date    High cholesterol     Hypertension     Ill-defined condition 2015    frontal lobe bleed    Musculoskeletal disorder      Past Surgical History:   Procedure Laterality Date    HX ABDOMINAL LAPAROSCOPY      HX APPENDECTOMY      HX TONSILLECTOMY      HX TUBAL LIGATION       Family History   Problem Relation Age of Onset    Heart Disease Mother     Hypertension Mother     Diabetes Mother     Heart Disease Father     Diabetes Father     Hypertension Father      Social History     Tobacco Use    Smoking status: Former Smoker     Packs/day: 0.50     Years: 42.00     Pack years: 21.00     Types: Cigarettes     Last attempt to quit: 3/11/2019     Years since quittin.1    Smokeless tobacco: Never Used   Substance Use Topics    Alcohol use: Yes     Frequency: Monthly or less     Drinks per session: 1 or 2     Binge frequency: Never     Comment: occasional        Review of Systems:   Constitutional: Negative for fever, chills, weight loss, + malaise/fatigue. HEENT: Negative for nosebleeds, vision changes. Respiratory: Negative for cough, hemoptysis  Cardiovascular: Negative for chest pain, + palpitations, orthopnea, claudication, leg swelling, syncope, and PND. + dizziness  Gastrointestinal: Negative for nausea, vomiting, diarrhea, blood in stool and melena. Genitourinary: Negative for dysuria, and hematuria. Musculoskeletal: Negative for myalgias, arthralgia. Skin: Negative for rash. Heme: Does not bleed or bruise easily.    Neurological: Negative for speech change and focal weakness     Objective:     Visit Vitals  /70 (BP 1 Location: Left arm, BP Patient Position: Sitting)   Pulse (!) 59   Resp 18   Ht 5' 4\" (1.626 m)   Wt 230 lb (104.3 kg)   SpO2 98%   BMI 39.48 kg/m²      Physical Exam:   Constitutional: well-developed and well-nourished. No respiratory distress. Head: Normocephalic and atraumatic. Eyes: Pupils are equal, round  ENT: hearing normal  Neck: supple. No JVD present. Cardiovascular: Normal rate, regular rhythm. Exam reveals no gallop and no friction rub. No murmur heard. Pulmonary/Chest: Effort normal and breath sounds normal. No wheezes. Abdominal: Soft, obese  Musculoskeletal: no edema. Neurological: alert,oriented. Skin: Skin is warm and dry  Psychiatric: normal mood and affect. Behavior is normal. Judgment and thought content normal.      Assessment/Plan:       ICD-10-CM ICD-9-CM    1. Sick sinus syndrome (HCC) I49.5 427.81    2. Dizziness R42 780.4    3. PAC (premature atrial contraction) I49.1 427.61    4. PVC (premature ventricular contraction) I49.3 427.69    5. Sinus bradycardia R00.1 427.89    6. ANTOINE (dyspnea on exertion) R06.09 786.09    7. Obesity, morbid ([address] E66.01 278.01    8. Essential hypertension I10 401.9    9. Palpitations R00.2 785. 1       The patient has symptomatic sick sinus syndrome. She cannot tolerate beta blocker or calcium channel blocker at this time  She understands the risks and benefits and indications of the dual-chamber pacemaker and she wishes to proceed. Risks involve device implant include but are not limited to bleeding, infection, valvular damage, heart attack, stroke, lung collapse (pneumothorax or hemothorax), heart collapse (pericardial tamponade), heart perforation, kidney failure, death. Elective or emergency surgery may be required to repair some of these complications. Prolonged hospitalization would be required. General anesthesia may be required for the procedure. Thank you for involving me in this patient's care and please call with further concerns or questions. Kelly Nichols M.D.   Electrophysiology/Cardiology  [employer] [facility] [place]  [address] , [facility] [address]  [place] [address] [phone]

## 2019-05-14 NOTE — PATIENT INSTRUCTIONS
Your Dual chamber pacemaker procedure has been scheduled for 7/26/19 at 8:00 am, at [address]    Please report to Admitting Department by 6:30 am, or 2 hours prior to your scheduled procedure. Please bring a list of your current medications and medication bottles, if able, to the hospital on this day. You will be unable to drive after your procedure so please make sure to bring someone with you to your procedure. You will need to have nothing to eat or drink after midnight, the night prior to your procedure. You may have small sips of water, if needed, to take with your medication. You will need labs drawn prior to your procedure. Please go to [facility] to have this done no sooner than 6/26/19 and no later than 7/22/19. You should not stop your medication days prior to your scheduled procedure. After your procedure, you will need to follow up with Pacemaker and nurse. Your follow-up appointment has been scheduled for 8/2/19 at 11:15 am.     Hibiclens 4% topical solution has been ordered and sent into your pharmacy  Patient it start Hibiclens application 5 days prior to procedure date and the morning of the procedure    Directions Hibiclens 4%: Start cleanse 5 days prior to procedure and morning of the procedure  1. Rinse area (upper chest and upper arms) with water. 2. Apply minimum amount necessary to scrub the upper chest area from shoulder/neck to mid line of chest and to below the nipple each of  5 nights before the day of the procedure  3. Let solution dry.    4. Discard any extra Hibiclens

## 2019-05-31 DIAGNOSIS — I10 ESSENTIAL HYPERTENSION: ICD-10-CM

## 2019-05-31 DIAGNOSIS — F17.200 SMOKER: ICD-10-CM

## 2019-05-31 RX ORDER — LISINOPRIL AND HYDROCHLOROTHIAZIDE 20; 25 MG/1; MG/1
TABLET ORAL
Qty: 90 TAB | Refills: 1 | Status: SHIPPED | OUTPATIENT
Start: 2019-05-31 | End: 2019-08-16 | Stop reason: SDUPTHER

## 2019-05-31 RX ORDER — BUPROPION HYDROCHLORIDE 150 MG/1
TABLET, EXTENDED RELEASE ORAL
Qty: 60 TAB | Refills: 0 | Status: ON HOLD | OUTPATIENT
Start: 2019-05-31 | End: 2019-07-26

## 2019-06-26 DIAGNOSIS — R06.09 DOE (DYSPNEA ON EXERTION): ICD-10-CM

## 2019-06-26 DIAGNOSIS — I10 ESSENTIAL HYPERTENSION: ICD-10-CM

## 2019-06-26 DIAGNOSIS — I49.5 SICK SINUS SYNDROME (HCC): ICD-10-CM

## 2019-06-26 DIAGNOSIS — R42 DIZZINESS: ICD-10-CM

## 2019-06-26 DIAGNOSIS — I49.3 PVC (PREMATURE VENTRICULAR CONTRACTION): ICD-10-CM

## 2019-06-26 DIAGNOSIS — R00.2 PALPITATIONS: ICD-10-CM

## 2019-06-26 DIAGNOSIS — E66.01 OBESITY, MORBID (HCC): ICD-10-CM

## 2019-06-26 DIAGNOSIS — I49.1 PAC (PREMATURE ATRIAL CONTRACTION): ICD-10-CM

## 2019-06-26 DIAGNOSIS — R00.1 SINUS BRADYCARDIA: ICD-10-CM

## 2019-06-26 DIAGNOSIS — Z01.818 PRE-OP TESTING: ICD-10-CM

## 2019-07-15 ENCOUNTER — TELEPHONE (OUTPATIENT)
Dept: CARDIOLOGY CLINIC | Age: 64
End: 2019-07-15

## 2019-07-15 NOTE — TELEPHONE ENCOUNTER
Regarding: FW: Prescription Question  Contact: [phone]      ----- Message -----  From: Rachele Abrams  Sent: 7/15/2019  11:36 AM  To: Abdifatah Flores Nurse Pool  Subject: Prescription Question                            ----- Message from [place], Generic sent at 7/15/2019 11:36 AM EDT -----    Brandi Aldana,    I have been prescribed diclofenac sod dr 75 mg 1 tab 2 x a day. This has been added by my ortho dr after we scheduled my pace maker. Should I stop taking this prior to the proceedure?     Karlos Salazar

## 2019-07-19 RX ORDER — CEFAZOLIN SODIUM/WATER 2 G/20 ML
2 SYRINGE (ML) INTRAVENOUS ONCE
Status: CANCELLED | OUTPATIENT
Start: 2019-07-19 | End: 2019-07-19

## 2019-07-19 RX ORDER — SODIUM CHLORIDE 0.9 % (FLUSH) 0.9 %
5-40 SYRINGE (ML) INJECTION AS NEEDED
Status: CANCELLED | OUTPATIENT
Start: 2019-07-19

## 2019-07-19 RX ORDER — SODIUM CHLORIDE 0.9 % (FLUSH) 0.9 %
5-40 SYRINGE (ML) INJECTION EVERY 8 HOURS
Status: CANCELLED | OUTPATIENT
Start: 2019-07-19

## 2019-07-22 ENCOUNTER — HOSPITAL ENCOUNTER (OUTPATIENT)
Dept: LAB | Age: 64
Discharge: HOME | End: 2019-07-22
Payer: MEDICARE

## 2019-07-22 PROCEDURE — 80048 BASIC METABOLIC PNL TOTAL CA: CPT

## 2019-07-22 PROCEDURE — 85025 COMPLETE CBC W/AUTO DIFF WBC: CPT

## 2019-07-22 PROCEDURE — 36415 COLL VENOUS BLD VENIPUNCTURE: CPT

## 2019-07-23 LAB
BASOPHILS # BLD AUTO: 0 X10E3/UL (ref 0–0.2)
BASOPHILS NFR BLD AUTO: 1 %
BUN SERPL-MCNC: 16 MG/DL (ref 8–27)
BUN/CREAT SERPL: 19 (ref 12–28)
CALCIUM SERPL-MCNC: 10 MG/DL (ref 8.7–10.3)
CHLORIDE SERPL-SCNC: 106 MMOL/L (ref 96–106)
CO2 SERPL-SCNC: 24 MMOL/L (ref 20–29)
CREAT SERPL-MCNC: 0.83 MG/DL (ref 0.57–1)
EOSINOPHIL # BLD AUTO: 0.2 X10E3/UL (ref 0–0.4)
EOSINOPHIL NFR BLD AUTO: 2 %
ERYTHROCYTE [DISTWIDTH] IN BLOOD BY AUTOMATED COUNT: 13.7 % (ref 12.3–15.4)
GLUCOSE SERPL-MCNC: 113 MG/DL (ref 65–99)
HCT VFR BLD AUTO: 41.8 % (ref 34–46.6)
HGB BLD-MCNC: 13.8 G/DL (ref 11.1–15.9)
IMM GRANULOCYTES # BLD AUTO: 0 X10E3/UL (ref 0–0.1)
IMM GRANULOCYTES NFR BLD AUTO: 0 %
LYMPHOCYTES # BLD AUTO: 2.4 X10E3/UL (ref 0.7–3.1)
LYMPHOCYTES NFR BLD AUTO: 31 %
MCH RBC QN AUTO: 30.1 PG (ref 26.6–33)
MCHC RBC AUTO-ENTMCNC: 33 G/DL (ref 31.5–35.7)
MCV RBC AUTO: 91 FL (ref 79–97)
MONOCYTES # BLD AUTO: 0.5 X10E3/UL (ref 0.1–0.9)
MONOCYTES NFR BLD AUTO: 7 %
NEUTROPHILS # BLD AUTO: 4.7 X10E3/UL (ref 1.4–7)
NEUTROPHILS NFR BLD AUTO: 59 %
PLATELET # BLD AUTO: 294 X10E3/UL (ref 150–450)
POTASSIUM SERPL-SCNC: 3.9 MMOL/L (ref 3.5–5.2)
RBC # BLD AUTO: 4.58 X10E6/UL (ref 3.77–5.28)
SODIUM SERPL-SCNC: 144 MMOL/L (ref 134–144)
WBC # BLD AUTO: 7.9 X10E3/UL (ref 3.4–10.8)

## 2019-07-26 ENCOUNTER — APPOINTMENT (OUTPATIENT)
Dept: GENERAL RADIOLOGY | Age: 64
End: 2019-07-26
Attending: NURSE PRACTITIONER
Payer: MEDICARE

## 2019-07-26 ENCOUNTER — HOSPITAL ENCOUNTER (OUTPATIENT)
Age: 64
Setting detail: OUTPATIENT SURGERY
Discharge: HOME | End: 2019-07-26
Attending: INTERNAL MEDICINE | Admitting: INTERNAL MEDICINE
Payer: MEDICARE

## 2019-07-26 VITALS
RESPIRATION RATE: 19 BRPM | HEIGHT: 64 IN | DIASTOLIC BLOOD PRESSURE: 75 MMHG | BODY MASS INDEX: 39.27 KG/M2 | OXYGEN SATURATION: 95 % | HEART RATE: 61 BPM | TEMPERATURE: 98.6 F | WEIGHT: 230 LBS | SYSTOLIC BLOOD PRESSURE: 150 MMHG

## 2019-07-26 DIAGNOSIS — Z95.0 PACEMAKER: Primary | ICD-10-CM

## 2019-07-26 DIAGNOSIS — I49.5 SINOATRIAL NODE DYSFUNCTION (HCC): ICD-10-CM

## 2019-07-26 PROCEDURE — 77030031139 HC SUT VCRL2 J&J -A: Performed by: INTERNAL MEDICINE

## 2019-07-26 PROCEDURE — 77030039046 HC PAD DEFIB RADIOTRNSPNT CNMD -B: Performed by: INTERNAL MEDICINE

## 2019-07-26 PROCEDURE — 77030018673: Performed by: INTERNAL MEDICINE

## 2019-07-26 PROCEDURE — 74011250636 HC RX REV CODE- 250/636

## 2019-07-26 PROCEDURE — 74011250637 HC RX REV CODE- 250/637: Performed by: NURSE PRACTITIONER

## 2019-07-26 PROCEDURE — 77030008459 HC STPLR SKN COOP -B: Performed by: INTERNAL MEDICINE

## 2019-07-26 PROCEDURE — 74011636320 HC RX REV CODE- 636/320: Performed by: INTERNAL MEDICINE

## 2019-07-26 PROCEDURE — 99153 MOD SED SAME PHYS/QHP EA: CPT | Performed by: INTERNAL MEDICINE

## 2019-07-26 PROCEDURE — C1785 PMKR, DUAL, RATE-RESP: HCPCS | Performed by: INTERNAL MEDICINE

## 2019-07-26 PROCEDURE — 74011000250 HC RX REV CODE- 250: Performed by: INTERNAL MEDICINE

## 2019-07-26 PROCEDURE — 77030019580 HC CBL PACE MEDT -B: Performed by: INTERNAL MEDICINE

## 2019-07-26 PROCEDURE — C1898 LEAD, PMKR, OTHER THAN TRANS: HCPCS | Performed by: INTERNAL MEDICINE

## 2019-07-26 PROCEDURE — 74011250636 HC RX REV CODE- 250/636: Performed by: INTERNAL MEDICINE

## 2019-07-26 PROCEDURE — 71045 X-RAY EXAM CHEST 1 VIEW: CPT

## 2019-07-26 PROCEDURE — C1893 INTRO/SHEATH, FIXED,NON-PEEL: HCPCS | Performed by: INTERNAL MEDICINE

## 2019-07-26 PROCEDURE — 74011000272 HC RX REV CODE- 272: Performed by: INTERNAL MEDICINE

## 2019-07-26 PROCEDURE — 33208 INSRT HEART PM ATRIAL & VENT: CPT | Performed by: INTERNAL MEDICINE

## 2019-07-26 PROCEDURE — 77030028698 HC BLD TISS PLSM MEDT -D: Performed by: INTERNAL MEDICINE

## 2019-07-26 PROCEDURE — 99152 MOD SED SAME PHYS/QHP 5/>YRS: CPT | Performed by: INTERNAL MEDICINE

## 2019-07-26 DEVICE — LEAD PCMKR CAPSUR FIX NOVUS 52 --: Type: IMPLANTABLE DEVICE | Status: FUNCTIONAL

## 2019-07-26 DEVICE — LEAD PCMKR CAPSUR FIX NOVUS 58 --: Type: IMPLANTABLE DEVICE | Status: FUNCTIONAL

## 2019-07-26 DEVICE — IPG W1DR01 AZURE XT DR MRI WL USA
Type: IMPLANTABLE DEVICE | Status: FUNCTIONAL
Brand: AZURE™ XT DR MRI SURESCAN™

## 2019-07-26 RX ORDER — FENTANYL CITRATE 50 UG/ML
INJECTION, SOLUTION INTRAMUSCULAR; INTRAVENOUS AS NEEDED
Status: DISCONTINUED | OUTPATIENT
Start: 2019-07-26 | End: 2019-07-26 | Stop reason: HOSPADM

## 2019-07-26 RX ORDER — OXYCODONE AND ACETAMINOPHEN 5; 325 MG/1; MG/1
1 TABLET ORAL ONCE
Status: DISCONTINUED | OUTPATIENT
Start: 2019-07-26 | End: 2019-07-26 | Stop reason: HOSPADM

## 2019-07-26 RX ORDER — SODIUM CHLORIDE 9 MG/ML
25 INJECTION, SOLUTION INTRAVENOUS CONTINUOUS
Status: DISCONTINUED | OUTPATIENT
Start: 2019-07-26 | End: 2019-07-26 | Stop reason: HOSPADM

## 2019-07-26 RX ORDER — MIDAZOLAM HYDROCHLORIDE 1 MG/ML
INJECTION, SOLUTION INTRAMUSCULAR; INTRAVENOUS AS NEEDED
Status: DISCONTINUED | OUTPATIENT
Start: 2019-07-26 | End: 2019-07-26 | Stop reason: HOSPADM

## 2019-07-26 RX ORDER — LIDOCAINE HYDROCHLORIDE 10 MG/ML
INJECTION INFILTRATION; PERINEURAL AS NEEDED
Status: DISCONTINUED | OUTPATIENT
Start: 2019-07-26 | End: 2019-07-26 | Stop reason: HOSPADM

## 2019-07-26 RX ORDER — SODIUM CHLORIDE 0.9 % (FLUSH) 0.9 %
5-40 SYRINGE (ML) INJECTION EVERY 8 HOURS
Status: DISCONTINUED | OUTPATIENT
Start: 2019-07-26 | End: 2019-07-26 | Stop reason: HOSPADM

## 2019-07-26 RX ORDER — ONDANSETRON 2 MG/ML
4 INJECTION INTRAMUSCULAR; INTRAVENOUS
Status: DISCONTINUED | OUTPATIENT
Start: 2019-07-26 | End: 2019-07-26 | Stop reason: HOSPADM

## 2019-07-26 RX ORDER — CEFAZOLIN SODIUM/WATER 2 G/20 ML
2 SYRINGE (ML) INTRAVENOUS ONCE
Status: COMPLETED | OUTPATIENT
Start: 2019-07-26 | End: 2019-07-26

## 2019-07-26 RX ORDER — SODIUM CHLORIDE 0.9 % (FLUSH) 0.9 %
5-40 SYRINGE (ML) INJECTION AS NEEDED
Status: DISCONTINUED | OUTPATIENT
Start: 2019-07-26 | End: 2019-07-26 | Stop reason: HOSPADM

## 2019-07-26 RX ORDER — HYDROCODONE BITARTRATE AND ACETAMINOPHEN 5; 325 MG/1; MG/1
1 TABLET ORAL
Status: DISCONTINUED | OUTPATIENT
Start: 2019-07-26 | End: 2019-07-26 | Stop reason: HOSPADM

## 2019-07-26 RX ORDER — VANCOMYCIN HYDROCHLORIDE 1 G/20ML
INJECTION, POWDER, LYOPHILIZED, FOR SOLUTION INTRAVENOUS AS NEEDED
Status: DISCONTINUED | OUTPATIENT
Start: 2019-07-26 | End: 2019-07-26 | Stop reason: HOSPADM

## 2019-07-26 RX ORDER — ACETAMINOPHEN 325 MG/1
650 TABLET ORAL
Status: DISCONTINUED | OUTPATIENT
Start: 2019-07-26 | End: 2019-07-26 | Stop reason: HOSPADM

## 2019-07-26 RX ORDER — CEPHALEXIN 250 MG/1
250 CAPSULE ORAL 3 TIMES DAILY
Qty: 15 CAP | Refills: 0 | Status: SHIPPED | OUTPATIENT
Start: 2019-07-26 | End: 2019-07-31

## 2019-07-26 RX ADMIN — SODIUM CHLORIDE 25 ML/HR: 900 INJECTION, SOLUTION INTRAVENOUS at 07:13

## 2019-07-26 RX ADMIN — HYDROCODONE BITARTRATE AND ACETAMINOPHEN 1 TABLET: 5; 325 TABLET ORAL at 10:04

## 2019-07-26 NOTE — Clinical Note
clipped, prepped with ChloraPrep and draped. Wet prep solution applied at: 741. Wet prep elapsed drying time: 742 mins.

## 2019-07-26 NOTE — PROCEDURES
Cardiac Procedure Note   Patient: Traci Gutierrez  MRN: [id number]  SSN: [id number]   YOB: 1955 Age: 70 y.o.   Sex: female    Date of Procedure: 7/26/2019   Pre-procedure Diagnosis: symptomatic sick sinus syndrome, morbid obesity  Post-procedure Diagnosis: same  Procedure: Permanent Pacemaker Insertion  :  Dr. Mita Jessica MD    Assistant(s):  None  Anesthesia: Moderate Sedation   Estimated Blood Loss: Less than 10 mL   Specimens Removed: None  Findings: RAA lead   RV mid septal lead  Complications: None   Implants:medtronic dual chamber pacer  Signed by:  Mita Jessica MD  7/26/2019  8:50 AM

## 2019-07-26 NOTE — PROGRESS NOTES
TRANSFER - OUT REPORT:    Verbal report given to MAURO [facility]) on Maryjane Rose  being transferred to (unit) for routine post - op       Report consisted of patients Situation, Background, Assessment and   Recommendations(SBAR). Information from the following report(s) SBAR, Procedure Summary, MAR and Recent Results was reviewed with the receiving nurse. Lines:   Peripheral IV 07/26/19 Left Antecubital (Active)        Opportunity for questions and clarification was provided.       Patient transported with:   [employer]

## 2019-07-26 NOTE — PROGRESS NOTES
Cardiac Cath Lab Procedure Area Arrival Note:    Elida Ivy arrived to Cardiac Cath Lab, Procedure Area. Patient identifiers verified with NAME and DATE OF BIRTH. Procedure verified with patient. Consent forms verified. Allergies verified. Patient informed of procedure and plan of care. Questions answered with review. Patient voiced understanding of procedure and plan of care. Patient on cardiac monitor, non-invasive blood pressure, SPO2 monitor. Placed on O2 @ 2 lpm via nasal cannula. IV of normal saline on pump at 25 ml/hr. Patient status doing well without problems. Patient is A&Ox 4. Patient reports no discomfort. Patient medicated during procedure with orders obtained and verified by Dr. Damir Miranda. Refer to patients Cardiac Cath Lab PROCEDURE REPORT for vital signs, assessment, status, and response during procedure, printed at end of case. Printed report on chart or scanned into chart.

## 2019-07-26 NOTE — PROGRESS NOTES
TRANSFER - IN REPORT:    Verbal report received from Olga on Alysha Childress  being received from procedure room for routine progression of care. Report consisted of patients Situation, Background, Assessment and Recommendations(SBAR). Information from the following report(s) SBAR was reviewed with the receiving clinician. Opportunity for questions and clarification was provided. Assessment completed upon patients arrival to [address] [place] [address]. Cardiac Cath Lab Recovery Arrival Note:    Alysha Childress arrived to [facility] recovery area. Patient procedure= PMI. Patient on cardiac monitor, non-invasive blood pressure, SPO2 monitor. On room air. IV  of NS on pump at Roddie Robert ml/hr. Patient status doing well without problems. Patient is A&Ox 3. Patient reports no CP. PROCEDURE SITE CHECK:    Procedure site:without any bleeding and no hematoma, No pain/discomfort reported at procedure site. No change in patient status. Continue to monitor patient and status.

## 2019-07-26 NOTE — DISCHARGE INSTRUCTIONS
PATIENT INSTRUCTIONS POST-PACEMAKER IMPLANT    1. No heavy lifting or exercises with the left arm for 4 weeks. This includes the following:  Do not raise arm above the shoulder level to comb hair, pull on clothes, etc... Do not use the affected arm to pull up or push up from a seated or laying  down position. Do not allow anyone else to pull on the affected arm. 2.  Dressing should remain in place for approx 1 week. It is typically removed in clinic at follow up. Keep site clean & dry. No showers until after follow up. 3.  Do not drive for 3 days    4. Call Dr. Geno Gonzalez at [phone] if you experience any of the following symptoms:  1. Redness at the pacemaker site  2. Swelling at or around the pacemaker or in the left arm  3. Pain around the pacemaker  4. Dizziness, lightheadedness, fainting spells  5. Lack of energy  6. Shortness of breath  7. Rapid heart rate  8. Chest or muscle twitches    5. Follow-up with Dr. Geno Gonzalez as scheduled  Future Appointments   Date Time Provider Magri Fountain   8/2/2019 11:15 AM PACEMAKER3, [address]   8/2/2019 11:30 AM Wound check [address]     6. You may use pain medication and ice pack for pain relief as needed. You may wear the sling as a reminder to keep your arm below the your shoulder. Manuela Brown M.D.  [facility]  Electrophysiology/Cardiology  [facility] [place]  [address] [place] [address]  [address] [facility], [address] [place] [address]  [phone]

## 2019-07-26 NOTE — ROUTINE PROCESS
Cardiac Cath Lab Recovery Arrival Note:      Melvin Jean Baptiste arrived to Cardiac Cath Lab, Recovery Area. Staff introduced to patient. Patient identifiers verified with NAME and DATE OF BIRTH. Procedure verified with patient. Consent forms reviewed and signed by patient or authorized representative and verified. Allergies verified. Patient and family oriented to department. Patient and family informed of procedure and plan of care. Questions answered with review. Patient prepped for procedure, per orders from physician, prior to arrival.    Patient on cardiac monitor, non-invasive blood pressure, SPO2 monitor. On room air. Patient is A&Ox 3. Patient reports no CP. Patient in stretcher, in low position, with side rails up, call bell within reach, patient instructed to call if assistance as needed. Patient prep in: [address], [place] [address].      Prep by: DON

## 2019-07-26 NOTE — Clinical Note
A Bovie was used. Blend setting: blend. Mode: bipolar. Coagulation Settin.  Cut Settin. Site (pad location): anterior thigh. Laterality: left.

## 2019-07-29 NOTE — PROGRESS NOTES
Labs without significant acute abnormality other than elevated glucose. OK to proceed with upcoming procedure as scheduled.

## 2019-07-30 ENCOUNTER — PATIENT MESSAGE (OUTPATIENT)
Dept: CARDIOLOGY CLINIC | Age: 64
End: 2019-07-30

## 2019-07-30 DIAGNOSIS — I10 ESSENTIAL HYPERTENSION: ICD-10-CM

## 2019-07-30 DIAGNOSIS — E78.5 HYPERLIPIDEMIA, UNSPECIFIED HYPERLIPIDEMIA TYPE: ICD-10-CM

## 2019-08-02 ENCOUNTER — CLINICAL SUPPORT (OUTPATIENT)
Dept: CARDIOLOGY CLINIC | Age: 64
End: 2019-08-02

## 2019-08-02 DIAGNOSIS — I49.5 SSS (SICK SINUS SYNDROME) (HCC): ICD-10-CM

## 2019-08-02 DIAGNOSIS — Z51.89 VISIT FOR WOUND CHECK: Primary | ICD-10-CM

## 2019-08-02 DIAGNOSIS — Z95.0 CARDIAC PACEMAKER IN SITU: Primary | ICD-10-CM

## 2019-08-02 NOTE — PROGRESS NOTES
Cardiac Electrophysiology Wound Check Note      Wound Check   Patient is here for wound check from Dual chamber pacemaker. No fever, drainage   Physical Exam   Constitutional: well-developed and well-nourished. Skin: Left side pocket is healing without redness, drainage, hematoma. The wound is intact and edges approximated. Dressing and steri strips removed. ASSESSMENT and PLAN   The incision is healing without redness, drainage, hematoma. Site intact. The patient has finished their anti-biotic and been compliant with arm restrictions. They will continue arms restrictions for 3 more weeks.   current treatment plan is effective, no change in therapy   Device check shows proper lead and generator functions    Follow-up Disposition:  Return 3 months I will check via device clinic or remote monitoring in the future

## 2019-08-16 ENCOUNTER — OFFICE VISIT (OUTPATIENT)
Dept: INTERNAL MEDICINE CLINIC | Age: 64
End: 2019-08-16

## 2019-08-16 VITALS
TEMPERATURE: 98.3 F | RESPIRATION RATE: 20 BRPM | DIASTOLIC BLOOD PRESSURE: 70 MMHG | HEART RATE: 83 BPM | HEIGHT: 64 IN | SYSTOLIC BLOOD PRESSURE: 130 MMHG | BODY MASS INDEX: 40.63 KG/M2 | WEIGHT: 238 LBS | OXYGEN SATURATION: 98 %

## 2019-08-16 DIAGNOSIS — I10 ESSENTIAL HYPERTENSION: ICD-10-CM

## 2019-08-16 DIAGNOSIS — H66.92 ACUTE LEFT OTITIS MEDIA: Primary | ICD-10-CM

## 2019-08-16 DIAGNOSIS — E78.5 HYPERLIPIDEMIA, UNSPECIFIED HYPERLIPIDEMIA TYPE: ICD-10-CM

## 2019-08-16 RX ORDER — ATORVASTATIN CALCIUM 10 MG/1
TABLET, FILM COATED ORAL
Qty: 90 TAB | Refills: 1 | Status: SHIPPED | OUTPATIENT
Start: 2019-08-16 | End: 2021-06-21 | Stop reason: SDUPTHER

## 2019-08-16 RX ORDER — DICLOFENAC SODIUM 75 MG/1
75 TABLET, DELAYED RELEASE ORAL 2 TIMES DAILY
Qty: 180 TAB | Refills: 1 | Status: SHIPPED | OUTPATIENT
Start: 2019-08-16 | End: 2021-06-21

## 2019-08-16 RX ORDER — LISINOPRIL AND HYDROCHLOROTHIAZIDE 20; 25 MG/1; MG/1
TABLET ORAL
Qty: 90 TAB | Refills: 1 | Status: SHIPPED | OUTPATIENT
Start: 2019-08-16 | End: 2020-06-09 | Stop reason: ALTCHOICE

## 2019-08-16 RX ORDER — AMOXICILLIN 500 MG/1
500 CAPSULE ORAL 2 TIMES DAILY
Qty: 14 CAP | Refills: 0 | Status: SHIPPED | OUTPATIENT
Start: 2019-08-16 | End: 2020-03-04

## 2019-08-16 NOTE — PROGRESS NOTES
Patient c/o throat sore when she swallows, worse on the left side and effecting left ear. Patient states this a post op check up for having pacemaker installed.

## 2019-08-16 NOTE — PROGRESS NOTES
HISTORY OF PRESENT ILLNESS  Devyn Jordan is a 70 y.o. female. HPI  Patient presents to the office to follow up her surgical procedure. Patient saw Dr. Nnamdi Cooper about her abnormal EKG. She had a Holter monitor done and was referred to Dr. Julien Zaidi. She got a pacemaker placed. She states she did not realize she was feeling as bad as she was. She states she is feeling great. She will follow up with Dr. Julien Zaidi every 3 months. She also saw the orthopedic doctor for her foot pain. She was put on diclofenac and states it has made all the difference in the world. Allergies   Allergen Reactions    Chloromycetin [Chloramphenicol Sod Succinate] Other (comments)     Anemia       Past Medical History:   Diagnosis Date    High cholesterol     Hypertension     Ill-defined condition 2015    frontal lobe bleed    Musculoskeletal disorder        Review of Systems   Constitutional: Negative for chills and fever. HENT: Positive for ear pain. Eyes: Negative. Respiratory: Negative. Cardiovascular: Negative. Gastrointestinal: Negative. Genitourinary: Negative. Musculoskeletal: Negative. Neurological: Negative. Endo/Heme/Allergies: Negative. Psychiatric/Behavioral: Negative. Blood pressure 130/70, pulse 83, temperature 98.3 °F (36.8 °C), temperature source Oral, resp. rate 20, height 5' 4\" (1.626 m), weight 238 lb (108 kg), SpO2 98 %. Physical Exam   Constitutional: She appears well-developed and well-nourished. HENT:   Left ear- TM is intact. Erythema noted. Right ear TM is normal appearing. Cardiovascular: Normal rate and regular rhythm. No murmur heard. trace peripheral edema. Pulmonary/Chest: Effort normal and breath sounds normal.   Psychiatric: She has a normal mood and affect. Her behavior is normal. Judgment and thought content normal.       ASSESSMENT and PLAN  Diagnoses and all orders for this visit:    1. Acute left otitis media  -     amoxicillin (AMOXIL) 500 mg capsule;  Take 1 Cap by mouth two (2) times a day. 2. Essential hypertension    3. Hyperlipidemia, unspecified hyperlipidemia type    Patient to start on the amoxil for her ear. She will be due labs in September. She will contact the office for a lab slip. All this was discussed with the patient and she understands and agrees.

## 2020-01-20 ENCOUNTER — OFFICE VISIT (OUTPATIENT)
Dept: CARDIOLOGY CLINIC | Age: 65
End: 2020-01-20

## 2020-01-20 DIAGNOSIS — Z95.0 CARDIAC PACEMAKER IN SITU: Primary | ICD-10-CM

## 2020-03-04 ENCOUNTER — OFFICE VISIT (OUTPATIENT)
Dept: FAMILY MEDICINE CLINIC | Age: 65
End: 2020-03-04

## 2020-03-04 VITALS
HEART RATE: 70 BPM | SYSTOLIC BLOOD PRESSURE: 120 MMHG | WEIGHT: 235 LBS | TEMPERATURE: 99.2 F | HEIGHT: 64 IN | DIASTOLIC BLOOD PRESSURE: 70 MMHG | OXYGEN SATURATION: 97 % | BODY MASS INDEX: 40.12 KG/M2 | RESPIRATION RATE: 16 BRPM

## 2020-03-04 DIAGNOSIS — Z72.0 TOBACCO USE: ICD-10-CM

## 2020-03-04 DIAGNOSIS — I49.5 SICK SINUS SYNDROME (HCC): Primary | ICD-10-CM

## 2020-03-04 DIAGNOSIS — Z87.891 PERSONAL HISTORY OF NICOTINE DEPENDENCE: ICD-10-CM

## 2020-03-04 DIAGNOSIS — I62.9 INTRACRANIAL HEMORRHAGE (HCC): ICD-10-CM

## 2020-03-04 DIAGNOSIS — Z12.11 COLON CANCER SCREENING: ICD-10-CM

## 2020-03-04 DIAGNOSIS — I10 ESSENTIAL HYPERTENSION: ICD-10-CM

## 2020-03-04 DIAGNOSIS — E78.2 MIXED HYPERLIPIDEMIA: ICD-10-CM

## 2020-03-04 DIAGNOSIS — K64.9 HEMORRHOIDS, UNSPECIFIED HEMORRHOID TYPE: ICD-10-CM

## 2020-03-04 DIAGNOSIS — G47.33 OSA (OBSTRUCTIVE SLEEP APNEA): ICD-10-CM

## 2020-03-04 DIAGNOSIS — Z83.3 FAMILY HISTORY OF DIABETES MELLITUS (DM): ICD-10-CM

## 2020-03-04 DIAGNOSIS — Z23 ENCOUNTER FOR IMMUNIZATION: ICD-10-CM

## 2020-03-04 DIAGNOSIS — Z87.892 HX OF ANAPHYLAXIS: ICD-10-CM

## 2020-03-04 DIAGNOSIS — Z13.0 SCREENING FOR DEFICIENCY ANEMIA: ICD-10-CM

## 2020-03-04 DIAGNOSIS — Z80.8 FAMILY HISTORY OF MELANOMA: ICD-10-CM

## 2020-03-04 DIAGNOSIS — E66.01 OBESITY, MORBID (HCC): ICD-10-CM

## 2020-03-04 RX ORDER — LISINOPRIL AND HYDROCHLOROTHIAZIDE 10; 12.5 MG/1; MG/1
1 TABLET ORAL
COMMUNITY
End: 2020-03-04

## 2020-03-04 RX ORDER — BENZONATATE 100 MG/1
CAPSULE ORAL
COMMUNITY
Start: 2019-12-29 | End: 2020-03-04

## 2020-03-04 RX ORDER — VARENICLINE TARTRATE 25 MG
KIT ORAL
Qty: 1 DOSE PACK | Refills: 0 | Status: SHIPPED | OUTPATIENT
Start: 2020-03-04 | End: 2020-03-04

## 2020-03-04 RX ORDER — BUPROPION HYDROCHLORIDE 150 MG/1
TABLET, EXTENDED RELEASE ORAL
Qty: 187 TAB | Refills: 0 | Status: SHIPPED | OUTPATIENT
Start: 2020-03-04 | End: 2020-06-09 | Stop reason: ALTCHOICE

## 2020-03-04 RX ORDER — DOXYCYCLINE 100 MG/1
TABLET ORAL
COMMUNITY
Start: 2019-12-31 | End: 2020-03-04

## 2020-03-04 RX ORDER — PREDNISONE 10 MG/1
TABLET ORAL
COMMUNITY
Start: 2019-12-29 | End: 2020-03-04 | Stop reason: ALTCHOICE

## 2020-03-04 NOTE — PROGRESS NOTES
Nevaeh Weathers  75 y.o. female  1955  [address] [place] [address] Road [id number]  [id number]     [facility] PRACTICE       Encounter Date: 3/4/2020           New Patient Visit Note: Jones Burton MD    Previous PCP: AMADOR Manriquez    Reason for Appointment:  Chief Complaint   Patient presents with   [address] patient     Palpitations     recently went to ER :  [facility].  needing a referral to current electrocardiologist due to insurance change .   [employer]     Mole     under right breast        History of Present Illness:  History provided by patient    Nevaeh Weathers is a 75 y.o. female who presents to clinic today for:       Family history of melanoma     Reports that her father had melanoma  Sister recently had squamous cell removed from her neck\  Patient has area under right breast that she is concerned about      Tobacco use     [place]: 61 years  PPD: 1   PY: 60  Current Status: smoking  Prior Attempts to Quit: quit in May, 2019 with Zyban but restarted in Nov, 2019      Hx of anaphylaxis     Allergic to wasps  Had to go to ER and experience throat closing up      Sick sinus syndrome [facility])     S/p PPM in July, 2019 by Dr. Portia Bhandari  She had palpitations last week and went to [place] ER, where she had an EKG and PM interrogation that she reports as normal  She states that she has not had any symptoms since, and she believes that it may have been related to stress      Pacemaker     7/26/2019 dual chamber pacemaker [employer]      Subcutaneous masses, trunk     Offered excision by general surgery in 2018, but she declines  03/04/20 : she reports that the pain has improved since changing her diet      Obesity, morbid ([address]     Diet: she reports that she has gone vegan for the last three weeks  Exercise: she recently signed up for silver sneakers and tries to walk 5000 steps per day      Hypertension     Medicine: Lisinopril-HCTZ 20-25 daily  Intterval History: 03/04/20: checking BP at home which have been elevated when she was stressed, but will come back down to normal range with mediatation and calming herself      Intracranial hemorrhage ([address]     She reports that in 2015 a shower curtain fell on her head. She had headache for a few weeks and started to experience hearing change. She went to [facility] where she was found to have head CT s/w bleed. She followed up with a neurologist afterwards. She reports having repeat imaging that was normal.   She has not had any symptoms since. Review of Systems  Denies CP or Dyspnea. All other ROS were reviewed and are negative except as discussed in HPI    Allergies: Gluten; Other medication; and Chloromycetin [chloramphenicol sod succinate]    Medications: (Updated to reflect final medication list after visit)    Current Outpatient Medications:     calcium citrate/vitamin D3 (CITRACAL + D PO), Take  by mouth., Disp: , Rfl:     zinc 50 mg tab tablet, Take  by mouth daily. , Disp: , Rfl:     buPROPion SR (WELLBUTRIN SR) 150 mg SR tablet, Take 1 Tab by mouth daily for 7 days, THEN 1 Tab two (2) times a day for 90 days. , Disp: 187 Tab, Rfl: 0    atorvastatin (LIPITOR) 10 mg tablet, take 1 tablet by mouth once daily, Disp: 90 Tab, Rfl: 1    lisinopril-hydroCHLOROthiazide (PRINZIDE, ZESTORETIC) 20-25 mg per tablet, take 1 tablet by mouth once daily, Disp: 90 Tab, Rfl: 1    diclofenac EC (VOLTAREN) 75 mg EC tablet, Take 1 Tab by mouth two (2) times a day. Indications: prn, Disp: 180 Tab, Rfl: 1    diphenhydrAMINE (BENADRYL) 25 mg capsule, Take 25 mg by mouth every four (4) hours as needed. , Disp: , Rfl:     MAGNESIUM CITRATE PO, Take 1,000 Caps by mouth daily. , Disp: , Rfl:     EPINEPHrine (EPIPEN 2-ASHLEY) 0.3 mg/0.3 mL (1:1,000) injection, 0.3 mL by IntraMUSCular route once as needed for up to 1 dose., Disp: 1 Syringe, Rfl: 2    acetaminophen (TYLENOL) 325 mg tablet, Take 325 mg by mouth as needed for Pain. Indications: PAIN, head, Disp: , Rfl:     aspirin 81 mg chewable tablet, Take 81 mg by mouth daily. Indications: heart palpitations, Disp: , Rfl:     History  Patient Care Team:  John Ruggiero MD as PCP - General (Family Practice)  John Ruggiero MD as PCP - [facility] Provider  Sriram Araiza MD (Cardiology)  Scottie Jameson MD (Cardiology)    Past Medical History: she has a past medical history of High cholesterol, Hypertension, Ill-defined condition (2015), and Musculoskeletal disorder. Past Surgical History: she has a past surgical history that includes hx appendectomy; hx tonsillectomy; hx abdominal laparoscopy; hx tubal ligation (1989); and pr ins new/rplcmt prm pm w/transv eltrd atrial&vent (N/A, 7/26/2019). Family Medical History: family history includes Diabetes in her father and mother; Heart Disease in her father and mother; Hypertension in her father and mother. Social History: she reports that she has been smoking cigarettes. She has a 21.00 pack-year smoking history. She has never used smokeless tobacco. She reports current alcohol use. She reports that she does not use drugs. Health Maintenance Due   Topic Date Due    DTaP/Tdap/Td series (1 - Tdap) 10/25/1964    Shingrix Vaccine Age 50> (1 of 2) 10/25/2003    FOBT Q1Y Age 50-75  10/25/2003    GLAUCOMA SCREENING Q2Y  10/25/2018    Lipid Screen  03/01/2020    Medicare Yearly Exam  03/05/2020       Objective:   Visit Vitals  /70 (BP 1 Location: Left leg, BP Patient Position: Sitting)   Pulse 70   Temp 99.2 °F (37.3 °C) (Oral)   Resp 16   Ht 5' 4\" (1.626 m)   Wt 235 lb (106.6 kg)   SpO2 97%   BMI 40.34 kg/m²     Wt Readings from Last 3 Encounters:   03/04/20 235 lb (106.6 kg)   08/16/19 238 lb (108 kg)   07/26/19 230 lb (104.3 kg)       Physical Exam  Vitals signs reviewed. Constitutional:       General: She is not in acute distress. Appearance: Normal appearance. She is normal weight.  She is not ill-appearing or toxic-appearing. HENT:      Head: Normocephalic and atraumatic. Right Ear: Hearing, tympanic membrane, ear canal and external ear normal. No drainage. No middle ear effusion. There is no impacted cerumen. Tympanic membrane is not injected or erythematous. Left Ear: Hearing, tympanic membrane, ear canal and external ear normal. No drainage. No middle ear effusion. There is no impacted cerumen. Tympanic membrane is not injected or erythematous. Nose: Nose normal. No nasal deformity or septal deviation. Mouth/Throat:      Lips: Pink. No lesions. Mouth: Mucous membranes are moist.      Palate: No mass. Pharynx: Oropharynx is clear. Uvula midline. No pharyngeal swelling, oropharyngeal exudate or posterior oropharyngeal erythema. Tonsils: No tonsillar exudate. Eyes:      General: Lids are normal. Vision grossly intact. Gaze aligned appropriately. Right eye: No discharge. Left eye: No discharge. Extraocular Movements: Extraocular movements intact. Conjunctiva/sclera: Conjunctivae normal.      Right eye: Right conjunctiva is not injected. Left eye: Left conjunctiva is not injected. Pupils: Pupils are equal, round, and reactive to light. Neck:      Musculoskeletal: Normal range of motion and neck supple. No neck rigidity or muscular tenderness. Vascular: No carotid bruit. Cardiovascular:      Rate and Rhythm: Normal rate and regular rhythm. Pulses: Normal pulses. Heart sounds: Normal heart sounds. No murmur. No friction rub. No gallop. Pulmonary:      Effort: Pulmonary effort is normal. No respiratory distress. Breath sounds: Normal breath sounds. No stridor. No wheezing, rhonchi or rales. Abdominal:      General: Bowel sounds are normal. There is no distension or abdominal bruit. Tenderness: There is no abdominal tenderness. There is no guarding. Musculoskeletal: Normal range of motion. Cervical back: Normal.   Lymphadenopathy:      Cervical: No cervical adenopathy. Skin:     General: Skin is warm. Coloration: Skin is not jaundiced. Neurological:      General: No focal deficit present. Mental Status: She is alert. Motor: Motor function is intact. Coordination: Coordination is intact. Gait: Gait is intact. Deep Tendon Reflexes:      Reflex Scores:       Patellar reflexes are 2+ on the right side and 2+ on the left side. Psychiatric:         Attention and Perception: Attention and perception normal.         Mood and Affect: Mood and affect normal.         Speech: Speech normal.         Cognition and Memory: Cognition and memory normal.         Assessment & Plan:  1. Sick sinus syndrome (HCC)  Chronic, stable s/p PPM. Will provide referral to EP as requested  discussed red flag symptoms and reasons to call or go to ED  - REFERRAL TO CARDIAC ELECTROPHYSIOLOGY    2. Family history of melanoma  Chronic, patient reports having a mole that she is concerned about. Given her family hx of skin cancer she would like referral to dermatology for evaluation of the mole and skin check. Will provide referral as requested. - REFERRAL TO DERMATOLOGY    3. AVTAR (obstructive sleep apnea)  Chronic, uncontrolled  - REFERRAL TO SLEEP STUDIES    4. Mixed hyperlipidemia  Chronic, unclear control  - continue Lipitor  - LIPID PANEL  - METABOLIC PANEL, COMPREHENSIVE    5. Hemorrhoids, unspecified hemorrhoid type  Chronic  - REFERRAL TO GASTROENTEROLOGY    6. Tobacco use   Chronic, patient expresses willingness to quit        - Discussed long term effects of tobacco use on health and cost of smoking        - Discussed options for quitting; patient states that he would like to try zyban        - Will plan to f/u with patient in 4 weeks to evaluate success in quitting  - buPROPion SR (WELLBUTRIN SR) 150 mg SR tablet;  Take 1 Tab by mouth daily for 7 days, THEN 1 Tab two (2) times a day for 90 days.  Dispense: 187 Tab; Refill: 0    7. Personal history of nicotine dependence   Patient would like screening for lung cancer. Patient has > 30 PY hx of smoking and is between 54to [de-identified]years of age and she is currently smoking. Patient engaged in shared decision making for this screening.   - CT LOW DOSE LUNG CANCER SCREENING; Future    8. Obesity, morbid ([facility]  I have reviewed/discussed the above normal BMI with the patient. I have recommended the following interventions: dietary management education, guidance, and counseling, encourage exercise, monitor weight and prescribed dietary intake. 9. Essential hypertension  Chronic, stable. BP at goal today  - continue current regimen  - continue to monitor BP    10. Family history of diabetes mellitus (DM)  - HEMOGLOBIN A1C WITH EAG    11. Screening for deficiency anemia  - CBC WITH AUTOMATED DIFF    12. Hx of anaphylaxis  Chronic, stable  - patient has nonexpired Epipen  - discussed need to have Epipen on her at all times  - discussed red flag symptoms and reasons to call or go to ED    13. Encounter for immunization  - [place] IMMUNIZ ADMIN,1 SINGLE/COMB VAC/TOXOID  - PNEUMOCOCCAL CONJ VACCINE 13 VALENT IM  - INFLUENZA VACCINE INACTIVATED (IIV), SUBUNIT, ADJUVANTED, IM  - ADMIN INFLUENZA VIRUS VAC    14. Colon cancer screening  - REFERRAL TO GASTROENTEROLOGY        I have discussed the diagnosis with the patient and the intended plan as seen in the above orders. The patient has received an after-visit summary along with patient information handout. I have discussed medication side effects and warnings with the patient as well. Dispostion  Follow-up and Dispositions    · Return in about 4 weeks (around 4/1/2020) for medicare wellness.            Casey Harmon MD

## 2020-03-04 NOTE — ASSESSMENT & PLAN NOTE
Key Neurological Meds             aspirin 81 mg chewable tablet (Taking) Take 81 mg by mouth daily.  Indications: heart palpitations        Lab Results   Component Value Date/Time    WBC 7.9 07/22/2019 07:55 AM    HGB 13.8 07/22/2019 07:55 AM    HCT 41.8 07/22/2019 07:55 AM    PLATELET 297 58/63/3377 07:55 AM    Creatinine 0.83 07/22/2019 07:55 AM    BUN 16 07/22/2019 07:55 AM

## 2020-03-04 NOTE — PATIENT INSTRUCTIONS
Starting a Weight Loss Plan: Care Instructions  Your Care Instructions    If you are thinking about losing weight, it can be hard to know where to start. Your doctor can help you set up a weight loss plan that best meets your needs. You may want to take a class on nutrition or exercise, or join a weight loss support group. If you have questions about how to make changes to your eating or exercise habits, ask your doctor about seeing a registered dietitian or an exercise specialist.  It can be a big challenge to lose weight. But you do not have to make huge changes at once. Make small changes, and stick with them. When those changes become habit, add a few more changes. If you do not think you are ready to make changes right now, try to pick a date in the future. Make an appointment to see your doctor to discuss whether the time is right for you to start a plan. Follow-up care is a key part of your treatment and safety. Be sure to make and go to all appointments, and call your doctor if you are having problems. It's also a good idea to know your test results and keep a list of the medicines you take. How can you care for yourself at home? · Set realistic goals. Many people expect to lose much more weight than is likely. A weight loss of 5% to 10% of your body weight may be enough to improve your health. · Get family and friends involved to provide support. Talk to them about why you are trying to lose weight, and ask them to help. They can help by participating in exercise and having meals with you, even if they may be eating something different. · Find what works best for you. If you do not have time or do not like to cook, a program that offers meal replacement bars or shakes may be better for you. Or if you like to prepare meals, finding a plan that includes daily menus and recipes may be best.  · Ask your doctor about other health professionals who can help you achieve your weight loss goals. ?  A dietitian can help you make healthy changes in your diet. ? An exercise specialist or  can help you develop a safe and effective exercise program.  ? A counselor or psychiatrist can help you cope with issues such as depression, anxiety, or family problems that can make it hard to focus on weight loss. · Consider joining a support group for people who are trying to lose weight. Your doctor can suggest groups in your area. Where can you learn more? Go to http://kaykay-lucas.info/. Enter S292 in the search box to learn more about \"Starting a Weight Loss Plan: Care Instructions. \"  Current as of: March 28, 2019  Content Version: 12.2  © 3727-7516 [employer]. Care instructions adapted under license by [employer] (which disclaims liability or warranty for this information). If you have questions about a medical condition or this instruction, always ask your healthcare professional. [place] [address] any warranty or liability for your use of this information. Learning About Low-Carbohydrate Diets for Weight Loss  What is a low-carbohydrate diet? Low-carb diets avoid foods that are high in carbohydrate. These high-carb foods include pasta, bread, rice, cereal, fruits, and starchy vegetables. Instead, these diets usually have you eat foods that are high in fat and protein. Many people lose weight quickly on a low-carb diet. But the early weight loss is water. People on this diet often gain the weight back after they start eating carbs again. Not all diet plans are safe or work well. A lot of the evidence shows that low-carb diets aren't healthy. That's because these diets often don't include healthy foods like fruits and vegetables. Losing weight safely means balancing protein, fat, and carbs with every meal and snack. And low-carb diets don't always provide the vitamins, minerals, and fiber you need.   If you have a serious medical condition, talk to your doctor before you try any diet. These conditions include kidney disease, heart disease, type 2 diabetes, high cholesterol, and high blood pressure. If you are pregnant, it may not be safe for your baby if you are on a low-carb diet. How can you lose weight safely? You might have heard that a diet plan helped another person lose weight. But that doesn't mean that it will work for you. It is very hard to stay on a diet that includes lots of big changes in your eating habits. If you want to get to a healthy weight and stay there, making healthy lifestyle changes will often work better than dieting. These steps can help. · Make a plan for change. Work with your doctor to create a plan that is right for you. · See a dietitian. He or she can show you how to make healthy changes in your eating habits. · Manage stress. If you have a lot of stress in your life, it can be hard to focus on making healthy changes to your daily habits. · Track your food and activity. You are likely to do better at losing weight if you keep track of what you eat and what you do. Follow-up care is a key part of your treatment and safety. Be sure to make and go to all appointments, and call your doctor if you are having problems. It's also a good idea to know your test results and keep a list of the medicines you take. Where can you learn more? Go to http://kaykay-lucas.info/. Enter A121 in the search box to learn more about \"Learning About Low-Carbohydrate Diets for Weight Loss. \"  Current as of: November 7, 2018  Content Version: 12.2  © 9451-5178 [employer]. Care instructions adapted under license by [employer] (which disclaims liability or warranty for this information).  If you have questions about a medical condition or this instruction, always ask your healthcare professional. [address] any warranty or liability for your use of this information. Vaccine Information Statement    Influenza (Flu) Vaccine (Inactivated or Recombinant): What You Need to Know    Many Vaccine Information Statements are available in [place] and other languages. See www.immunize.org/vis  Hojas de información sobre vacunas están disponibles en español y en muchos otros idiomas. Visite www.immunize.org/vis    1. Why get vaccinated? Influenza vaccine can prevent influenza (flu). Flu is a contagious disease that spreads around the [facility] every year, usually between October and May. Anyone can get the flu, but it is more dangerous for some people. Infants and young children, people 72years of age and older, pregnant women, and people with certain health conditions or a weakened immune system are at greatest risk of flu complications. Pneumonia, bronchitis, sinus infections and ear infections are examples of flu-related complications. If you have a medical condition, such as heart disease, cancer or diabetes, flu can make it worse. Flu can cause fever and chills, sore throat, muscle aches, fatigue, cough, headache, and runny or stuffy nose. Some people may have vomiting and diarrhea, though this is more common in children than adults. Each year thousands of people in the [facility] die from flu, and many more are hospitalized. Flu vaccine prevents millions of illnesses and flu-related visits to the doctor each year. 2. Influenza vaccines     CDC recommends everyone 10months of age and older get vaccinated every flu season. Children 6 months through 6years of age may need 2 doses during a single flu season. Everyone else needs only 1 dose each flu season. It takes about 2 weeks for protection to develop after vaccination. There are many flu viruses, and they are always changing. Each year a new flu vaccine is made to protect against three or four viruses that are likely to cause disease in the upcoming flu season.  Even when the vaccine doesnt exactly match these viruses, it may still provide some protection. Influenza vaccine does not cause flu. Influenza vaccine may be given at the same time as other vaccines. 3. Talk with your health care provider    Tell your vaccine provider if the person getting the vaccine:   Has had an allergic reaction after a previous dose of influenza vaccine, or has any severe, life-threatening allergies.  Has ever had Guillain-Barré Syndrome (also called GBS). In some cases, your health care provider may decide to postpone influenza vaccination to a future visit. People with minor illnesses, such as a cold, may be vaccinated. People who are moderately or severely ill should usually wait until they recover before getting influenza vaccine. Your health care provider can give you more information. 4. Risks of a reaction     Soreness, redness, and swelling where shot is given, fever, muscle aches, and headache can happen after influenza vaccine.  There may be a very small increased risk of Guillain-Barré Syndrome (GBS) after inactivated influenza vaccine (the flu shot). Janece Pacheco children who get the flu shot along with pneumococcal vaccine (PCV13), and/or DTaP vaccine at the same time might be slightly more likely to have a seizure caused by fever. Tell your health care provider if a child who is getting flu vaccine has ever had a seizure. People sometimes faint after medical procedures, including vaccination. Tell your provider if you feel dizzy or have vision changes or ringing in the ears. As with any medicine, there is a very remote chance of a vaccine causing a severe allergic reaction, other serious injury, or death. 5. What if there is a serious problem? An allergic reaction could occur after the vaccinated person leaves the clinic.  If you see signs of a severe allergic reaction (hives, swelling of the face and throat, difficulty breathing, a fast heartbeat, dizziness, or weakness), call  and get the person to the nearest hospital.    For other signs that concern you, call your health care provider. Adverse reactions should be reported to the Vaccine Adverse Event Reporting System (VAERS). Your health care provider will usually file this report, or you can do it yourself. Visit the VAERS website at www.vaers. hhs.gov or call 5-637.169.7676. VAERS is only for reporting reactions, and VAERS staff do not give medical advice. 6. The National Vaccine Injury Compensation Program    The [facility] Vaccine Injury Compensation Program (VICP) is a federal program that was created to compensate people who may have been injured by certain vaccines. Visit the VICP website at www.hrsa.gov/vaccinecompensation or call 9-518.935.6172 to learn about the program and about filing a claim. There is a time limit to file a claim for compensation. 7. How can I learn more?  Ask your health care provider.  Call your local or state health department.  Contact the Centers for Disease Control and Prevention (CDC):  - Call 4-947.689.3287 (1-800-CDC-INFO) or  - Visit CDCs influenza website at www.cdc.gov/flu    Vaccine Information Statement (Interim)  Inactivated Influenza Vaccine   8/15/2019  42 MARCIAL Walters [id number]   Department of Health and Human Services  Centers for Disease Control and Prevention    Office Use Only

## 2020-03-04 NOTE — ASSESSMENT & PLAN NOTE
Key Obesity Meds     Patient is on no anti-obesity meds.         Lab Results   Component Value Date/Time    Glucose 113 07/22/2019 07:55 AM    Cholesterol, total 178 03/01/2019 08:53 AM    HDL Cholesterol 35 03/01/2019 08:53 AM    LDL, calculated 111 03/01/2019 08:53 AM    Triglyceride 161 03/01/2019 08:53 AM    Sodium 144 07/22/2019 07:55 AM    Potassium 3.9 07/22/2019 07:55 AM    ALT (SGPT) 21 03/01/2019 08:53 AM    AST (SGOT) 19 03/01/2019 08:53 AM    VITAMIN D, 25-HYDROXY 20.4 03/01/2019 08:53 AM

## 2020-03-04 NOTE — ASSESSMENT & PLAN NOTE
Key CAD CHF Meds             atorvastatin (LIPITOR) 10 mg tablet (Taking) take 1 tablet by mouth once daily    lisinopril-hydroCHLOROthiazide (PRINZIDE, ZESTORETIC) 20-25 mg per tablet (Taking) take 1 tablet by mouth once daily    aspirin 81 mg chewable tablet (Taking) Take 81 mg by mouth daily. Indications: heart palpitations    lisinopril-hydroCHLOROthiazide (PRINZIDE, ZESTORETIC) 10-12.5 mg per tablet 1 Tab.         Lab Results   Component Value Date/Time    Sodium 144 07/22/2019 07:55 AM    Potassium 3.9 07/22/2019 07:55 AM    Cholesterol, total 178 03/01/2019 08:53 AM    HDL Cholesterol 35 03/01/2019 08:53 AM    LDL, calculated 111 03/01/2019 08:53 AM    Triglyceride 161 03/01/2019 08:53 AM

## 2020-03-04 NOTE — PROGRESS NOTES
Chief Complaint   Patient presents with   [address] patient     Palpitations     recently went to ER :  [facility].  needing a referral to current echocardiologist due to insurance change .   [employer]     Mole     under right breast

## 2020-03-08 PROBLEM — G47.33 OSA (OBSTRUCTIVE SLEEP APNEA): Status: ACTIVE | Noted: 2020-03-08

## 2020-03-08 PROBLEM — K64.9 HEMORRHOIDS: Status: ACTIVE | Noted: 2020-03-08

## 2020-04-01 ENCOUNTER — TELEPHONE (OUTPATIENT)
Dept: FAMILY MEDICINE CLINIC | Age: 65
End: 2020-04-01

## 2020-04-01 NOTE — TELEPHONE ENCOUNTER
Chief Complaint   Patient presents with    Prior Auth     [employer] [facility]CL  [facility] TABLETS     Prior Auth     APPROVED:  [place] Case: [id number], Status: Approved, Coverage Starts on: 4/1/2020 12:00:00 AM, Coverage Ends on: 4/3/2021        Patient has been informed via Aleth of approval.  Alida Sims has been faxed approval notification at [phone] with confirmation received.   Millie Garcia LPN

## 2020-04-03 ENCOUNTER — TELEPHONE (OUTPATIENT)
Dept: FAMILY MEDICINE CLINIC | Age: 65
End: 2020-04-03

## 2020-04-03 NOTE — TELEPHONE ENCOUNTER
----- Message from Hamilton Graves sent at 4/3/2020  3:14 PM EDT -----  Regarding: Dr. Butler [place]: [phone]  Name of medication and dosage if known: Bupropion-150mg  Is patient out of this medication (yes/no): Yes    Pharmacy name: [address] [place] [address] listed in chart? (yes/no): Yes  Pharmacy Phone Number: [phone]  Date of last visit: Monday, January 06, 2020 04:30 PM    Details to clarify the request: Pt states that Medicare is rejecting her refill request due to and error in the script. Pt's pcp added an extra 7 pill (1 week) to the script, which Medicare refuses to approve. Pt needs the script corrected and resent.

## 2020-04-07 NOTE — TELEPHONE ENCOUNTER
Outbound call to patients pharamacy. Pharmacist stated that patient picked up Bupropion medication on 04/04/2020. Nurse verbalized understanding. No further actions required at this time.

## 2020-05-07 ENCOUNTER — OFFICE VISIT (OUTPATIENT)
Dept: CARDIOLOGY CLINIC | Age: 65
End: 2020-05-07

## 2020-05-07 DIAGNOSIS — Z95.0 CARDIAC PACEMAKER IN SITU: Primary | ICD-10-CM

## 2020-06-08 ENCOUNTER — E-VISIT (OUTPATIENT)
Dept: FAMILY MEDICINE CLINIC | Age: 65
End: 2020-06-08

## 2020-06-08 ENCOUNTER — HOSPITAL ENCOUNTER (OUTPATIENT)
Dept: CT IMAGING | Age: 65
Discharge: HOME | End: 2020-06-08
Attending: FAMILY MEDICINE
Payer: MEDICARE

## 2020-06-08 DIAGNOSIS — Z72.0 TOBACCO USE: ICD-10-CM

## 2020-06-08 DIAGNOSIS — Z87.891 PERSONAL HISTORY OF NICOTINE DEPENDENCE: ICD-10-CM

## 2020-06-08 PROCEDURE — G0297 LDCT FOR LUNG CA SCREEN: HCPCS

## 2020-06-09 ENCOUNTER — VIRTUAL VISIT (OUTPATIENT)
Dept: FAMILY MEDICINE CLINIC | Age: 65
End: 2020-06-09

## 2020-06-09 DIAGNOSIS — E66.01 OBESITY, MORBID (HCC): ICD-10-CM

## 2020-06-09 DIAGNOSIS — Z80.8 FAMILY HISTORY OF MELANOMA: ICD-10-CM

## 2020-06-09 DIAGNOSIS — I49.5 SICK SINUS SYNDROME (HCC): Primary | ICD-10-CM

## 2020-06-09 DIAGNOSIS — Z72.0 TOBACCO USE: ICD-10-CM

## 2020-06-09 DIAGNOSIS — G47.33 OSA (OBSTRUCTIVE SLEEP APNEA): ICD-10-CM

## 2020-06-09 DIAGNOSIS — I10 ESSENTIAL HYPERTENSION: ICD-10-CM

## 2020-06-09 NOTE — LETTER
NOTIFICATION TO EMPLOYER 
 
6/9/2020 5:32 PM 
 
Ms. Chris Ortega [address] [facility] [id number] To Whom It May Concern: 
 
Chris Ortega is currently under the care of MARÍA Sun. She has medical conditions that place her at increased risk for severe illness if she were to contract COVID 19 infection. Please provide accommodations to minimize this risk. If there are questions or concerns please have the patient contact our office.  
 
 
 
Sincerely, 
 
 
Jakob Franco MD

## 2020-06-09 NOTE — PROGRESS NOTES
Funmilayo Harrison  75 y.o. female  1955  [facility] [address]  [id number]     [address] [facility]       Encounter Date: 6/9/2020           Established Patient Visit Note: Selvin Persaud MD    Reason for Appointment:  Chief Complaint   Patient presents with    Follow-up         History of Present Illness:  History provided by patient    Funmilayo Harrison is a 75 y.o. female who presents today for:    -Patient had labs ordered in March, 2020, but she has not yet had these drawn, but she plans to schedule an appointment soon  - She has been working at home with no recent illness or hospitalizations  - She would like a letter of for work to allow her to work from home due to increased risk for COVID related to her risk factors ofage and hx of pacemaker  - SSS: s/p pacemaker: followed by EP  - HTN: takes Lisinopril- HCTZ 20-25; reports home BP around 498 systolic without BP med; she thinks that quitting smoking and eating better has improved her diet  - AVTAR: has apt on 6/17/20 with sleep medicine  - Obesity: patient reports that being at home has been good for her diet and she is walking more recently with her dog  - Family Hx of Melanoma: has not yet scheduled dermatology visit  - Tobacco Use: patient has cut down to maybe 1 cigarette per week; she is not longer taking/needing Wellbutrin  - Lung Cancer Screening: patient had low dose CT yesterday with normal results, follow up recommended 1 year  - Colon Cancer Screening: has apt 7/16/20 for colonoscopy    Review of Systems  Review of Systems   Constitutional: Negative for chills and fever. Respiratory: Negative for cough, shortness of breath and wheezing. Cardiovascular: Negative for chest pain and palpitations. Allergies: Gluten;  Other medication; and Chloromycetin [chloramphenicol sod succinate]    Medications: (Updated to reflect final medication list after visit)    Current Outpatient Medications:     calcium citrate/vitamin D3 (CITRACAL + D PO), Take  by mouth., Disp: , Rfl:     zinc 50 mg tab tablet, Take  by mouth daily. , Disp: , Rfl:     atorvastatin (LIPITOR) 10 mg tablet, take 1 tablet by mouth once daily, Disp: 90 Tab, Rfl: 1    lisinopril-hydroCHLOROthiazide (PRINZIDE, ZESTORETIC) 20-25 mg per tablet, take 1 tablet by mouth once daily, Disp: 90 Tab, Rfl: 1    diclofenac EC (VOLTAREN) 75 mg EC tablet, Take 1 Tab by mouth two (2) times a day. Indications: prn, Disp: 180 Tab, Rfl: 1    diphenhydrAMINE (BENADRYL) 25 mg capsule, Take 25 mg by mouth every four (4) hours as needed. , Disp: , Rfl:     MAGNESIUM CITRATE PO, Take 1,000 Caps by mouth daily. , Disp: , Rfl:     EPINEPHrine (EPIPEN 2-ASHLEY) 0.3 mg/0.3 mL (1:1,000) injection, 0.3 mL by IntraMUSCular route once as needed for up to 1 dose., Disp: 1 Syringe, Rfl: 2    acetaminophen (TYLENOL) 325 mg tablet, Take 325 mg by mouth as needed for Pain. Indications: PAIN, head, Disp: , Rfl:     aspirin 81 mg chewable tablet, Take 81 mg by mouth daily. Indications: heart palpitations, Disp: , Rfl:     History  Patient Care Team:  Maximo Ramos MD as PCP - General (Family Practice)  Maximo Ramos MD as PCP - [facility]  Jenny Zaragoza MD (Cardiology)  Roque Sheppard MD (Cardiology)    Past Medical History: she has a past medical history of High cholesterol, Hypertension, Ill-defined condition (2015), and Musculoskeletal disorder. Past Surgical History: she has a past surgical history that includes hx appendectomy; hx tonsillectomy; hx abdominal laparoscopy; hx tubal ligation (1989); and pr ins new/rplcmt prm pm w/transv eltrd atrial&vent (N/A, 7/26/2019). Family Medical History: family history includes Diabetes in her father and mother; Heart Disease in her father and mother; Hypertension in her father and mother. Social History: she reports that she has been smoking cigarettes. She has a 21.00 pack-year smoking history.  She has never used smokeless tobacco. She reports current alcohol use. She reports that she does not use drugs. Objective:   Vital Signs  There were no vitals taken for this visit. Unable to obtain full set of vital signs today as patient does not have all equipment for this at home    Physical Exam  Constitutional:       Appearance: Normal appearance. She is well-groomed and normal weight. Eyes:      General: Lids are normal. Vision grossly intact. Gaze aligned appropriately. Conjunctiva/sclera:      Right eye: Right conjunctiva is not injected. Left eye: Left conjunctiva is not injected. Neck:      Musculoskeletal: Full passive range of motion without pain and normal range of motion. Pulmonary:      Effort: Pulmonary effort is normal. No tachypnea, bradypnea, accessory muscle usage or respiratory distress. Skin:     Coloration: Skin is not ashen, cyanotic, jaundiced or mottled. Neurological:      General: No focal deficit present. Mental Status: She is alert. Mental status is at baseline. Psychiatric:         Attention and Perception: Attention and perception normal.         Mood and Affect: Mood and affect normal.         Speech: Speech normal.         Behavior: Behavior normal. Behavior is cooperative. Assessment & Plan:      ICD-10-CM ICD-9-CM    1. Sick sinus syndrome (HCC) I49.5 427.81    2. Family history of melanoma Z80.8 V16.8    3. AVTAR (obstructive sleep apnea) G47.33 327.23    4. Essential hypertension I10 401.9    5. Tobacco use Z72.0 305.1    6.  Obesity, morbid ([facility] [address] E66.01 278.01      Completed paperwork for patient's employer related to her increased risk for infection    SSS: Chronic, stable managed by cardiology  Family Hx of Melanoma: follow up with dermatology  AVTAR: follow up with sleep medicine as referred  HTN: Chronic, patient's BP improved with decreased smoking; will hold BP meds at this time and have patient continue checking BP at home  Tobacco use: Chronic, patient significantly reduced usage; provided encouragement and recommendations for continued cessation  Obesity: I have reviewed/discussed the above normal BMI with the patient. I have recommended the following interventions: dietary management education, guidance, and counseling, encourage exercise, monitor weight and prescribed dietary intake. I was in the office while conducting this encounter. Consent:  She and/or her healthcare decision maker is aware that this patient-initiated Telehealth encounter is a billable service, with coverage as determined by her insurance carrier. She is aware that she may receive a bill and has provided verbal consent to proceed: Yes    This virtual visit was conducted via aWhere. Pursuant to the emergency declaration under the [facility], 1135 waiver authority and the [employer] and [employer] Act, this Virtual  Visit was conducted to reduce the patient's risk of exposure to COVID-19 and provide continuity of care for an established patient. Services were provided through a video synchronous discussion virtually to substitute for in-person clinic visit. Due to this being a TeleHealth evaluation, many elements of the physical examination are unable to be assessed. Total Time: minutes: 11-20 minutes. I have discussed the diagnosis with the patient and the intended plan as seen in the above orders. The patient has received an after-visit summary along with patient information handout. I have discussed medication side effects and warnings with the patient as well. Disposition  Follow-up and Dispositions    · Return in about 3 months (around 9/9/2020).            Kilo Henson MD

## 2020-06-16 ENCOUNTER — VIRTUAL VISIT (OUTPATIENT)
Dept: SLEEP MEDICINE | Age: 65
End: 2020-06-16

## 2020-06-16 DIAGNOSIS — G47.33 OSA (OBSTRUCTIVE SLEEP APNEA): Primary | ICD-10-CM

## 2020-06-16 DIAGNOSIS — I10 ESSENTIAL HYPERTENSION: ICD-10-CM

## 2020-06-16 NOTE — PATIENT INSTRUCTIONS
[address] [place] [facility], [address] Tel.  [phone] Fax. [address] [facility] [address] [place], [address] [facility] Tel.  [phone] Fax. [phone] [address]  Tel.  [phone] Fax. [phone] Sleep Apnea: After Your Visit Your Care Instructions Sleep apnea occurs when you frequently stop breathing for 10 seconds or longer during sleep. It can be mild to severe, based on the number of times per hour that you stop breathing or have slowed breathing. Blocked or narrowed airways in your nose, mouth, or throat can cause sleep apnea. Your airway can become blocked when your throat muscles and tongue relax during sleep. Sleep apnea is common, occurring in 1 out of 20 individuals. Individuals having any of the following characteristics should be evaluated and treated right away due to high risk and detrimental consequences from untreated sleep apnea: 
1. Obesity 2. Congestive Heart failure 3. Atrial Fibrillation 4. Uncontrolled Hypertension 5. Type II Diabetes 6. Night-time Arrhythmias 7. Stroke 8. Pulmonary Hypertension 9. High-risk Driving Populations (pilots, truck drivers, etc.) 10. Patients Considering Weight-loss Surgery How do you know you have sleep apnea? You probably have sleep apnea if you answer 'yes' to 3 or more of the following questions: S - Have you been told that you Snore? T - Are you often Tired during the day? O - Has anyone Observed you stop breathing while sleeping? P- Do you have (or are being treated for) high blood Pressure? B - Are you obese (Body Mass Index > 35)? A - Is your Age 48years old or older? N - Is your Neck size greater than 16 inches? G - Are you male Gender? A sleep physician can prescribe a breathing device that prevents tissues in the throat from blocking your airway. Or your doctor may recommend using a dental device (oral breathing device) to help keep your airway open. In some cases, surgery may be needed to remove enlarged tissues in the throat. Follow-up care is a key part of your treatment and safety. Be sure to make and go to all appointments, and call your doctor if you are having problems. It's also a good idea to know your test results and keep a list of the medicines you take. How can you care for yourself at home? · Lose weight, if needed. It may reduce the number of times you stop breathing or have slowed breathing. · Go to bed at the same time every night. · Sleep on your side. It may stop mild apnea. If you tend to roll onto your back, sew a pocket in the back of your pajama top. Put a tennis ball into the pocket, and stitch the pocket shut. This will help keep you from sleeping on your back. · Avoid alcohol and medicines such as sleeping pills and sedatives before bed. · Do not smoke. Smoking can make sleep apnea worse. If you need help quitting, talk to your doctor about stop-smoking programs and medicines. These can increase your chances of quitting for good. · Prop up the head of your bed 4 to 6 inches by putting bricks under the legs of the bed. · Treat breathing problems, such as a stuffy nose, caused by a cold or allergies. · Use a continuous positive airway pressure (CPAP) breathing machine if lifestyle changes do not help your apnea and your doctor recommends it. The machine keeps your airway from closing when you sleep. · If CPAP does not help you, ask your doctor whether you should try other breathing machines. A bilevel positive airway pressure machine has two types of air pressureâone for breathing in and one for breathing out. Another device raises or lowers air pressure as needed while you breathe. · If your nose feels dry or bleeds when using one of these machines, talk with your doctor about increasing moisture in the air. A humidifier may help. · If your nose is runny or stuffy from using a breathing machine, talk with your doctor about using decongestants or a corticosteroid nasal spray. When should you call for help? Watch closely for changes in your health, and be sure to contact your doctor if: 
· You still have sleep apnea even though you have made lifestyle changes. · You are thinking of trying a device such as CPAP. · You are having problems using a CPAP or similar machine. Where can you learn more? Go to [employer]. Enter Q685 in the search box to learn more about \"Sleep Apnea: After Your Visit. \"  
© 4995-2922 Healthwise, Incorporated. Care instructions adapted under license by [facility] (which disclaims liability or warranty for this information). This care instruction is for use with your licensed healthcare professional. If you have questions about a medical condition or this instruction, always ask your healthcare professional. Gideon Roberson any warranty or liability for your use of this information. PROPER SLEEP HYGIENE What to avoid · Do not have drinks with caffeine, such as coffee or black tea, for 8 hours before bed. · Do not smoke or use other types of tobacco near bedtime. Nicotine is a stimulant and can keep you awake. · Avoid drinking alcohol late in the evening, because it can cause you to wake in the middle of the night. · Do not eat a big meal close to bedtime. If you are hungry, eat a light snack. · Do not drink a lot of water close to bedtime, because the need to urinate may wake you up during the night. · Do not read or watch TV in bed. Use the bed only for sleeping and sexual activity. What to try · Go to bed at the same time every night, and wake up at the same time every morning. Do not take naps during the day. · Keep your bedroom quiet, dark, and cool. · Get regular exercise, but not within 3 to 4 hours of your bedtime. Flory Billing · Sleep on a comfortable pillow and mattress. · If watching the clock makes you anxious, turn it facing away from you so you cannot see the time. · If you worry when you lie down, start a worry book. Well before bedtime, write down your worries, and then set the book and your concerns aside. · Try meditation or other relaxation techniques before you go to bed. · If you cannot fall asleep, get up and go to another room until you feel sleepy. Do something relaxing. Repeat your bedtime routine before you go to bed again. · Make your house quiet and calm about an hour before bedtime. Turn down the lights, turn off the TV, log off the computer, and turn down the volume on music. This can help you relax after a busy day. Drowsy Driving The [employer] cites drowsiness as a causing factor in more than 868,424 police reported crashes annually, resulting in 76,000 injuries and 1,500 deaths. Other surveys suggest 55% of people polled have driven while drowsy in the past year, 23% had fallen asleep but not crashed, 3% crashed, and 2% had and accident due to drowsy driving. Who is at risk? Young Drivers: One study of drowsy driving accidents states that 55% of the drivers were under 25 years. Of those, 75% were male. Shift Workers and Travelers: People who work overnight or travel across time zones frequently are at higher risk of experiencing Circadian Rhythm Disorders. They are trying to work and function when their body is programed to sleep. Sleep Deprived: Lack of sleep has a serious impact on your ability to pay attention or focus on a task. Consistently getting less than the average of 8 hours your body needs creates partial or cumulative sleep deprivation. Untreated Sleep Disorders: Sleep Apnea, Narcolepsy, R.L.S., and other sleep disorders (untreated) prevent a person from getting enough restful sleep. This leads to excessive daytime sleepiness and increases the risk for drowsy driving accidents by up to 7 times. Medications / Alcohol: Even over the counter medications can cause drowsiness. Medications that impair a drivers attention should have a warning label. Alcohol naturally makes you sleepy and on its own can cause accidents. Combined with excessive drowsiness its effects are amplified. Signs of Drowsy Driving: * You don't remember driving the last few miles * You may drift out of your bret * You are unable to focus and your thoughts wander * You may yawn more often than normal 
 * You have difficulty keeping your eyes open / nodding off * Missing traffic signs, speeding, or tailgating Prevention-  
Good sleep hygiene, lifestyle and behavioral choices have the most impact on drowsy driving. There is no substitute for sleep and the average person requires 8 hours nightly. If you find yourself driving drowsy, stop and sleep. Consider the sleep hygiene tips provided during your visit as well. Medication Refill Policy: Refills for all medications require 1 week advance notice. Please have your pharmacy fax a refill request. We are unable to fax, or call in \"controled substance\" medications and you will need to pick these prescriptions up from our office. MyChart Activation Thank you for requesting access to [employer]. Please follow the instructions below to securely access and download your online medical record. [employer] allows you to send messages to your doctor, view your test results, renew your prescriptions, schedule appointments, and more. How Do I Sign Up? 1. In your internet browser, go to [email or web address]. 2. Click on the First Time User? Click Here link in the Sign In box. You will see the New Member Sign Up page. 3. Enter your [employer] Access Code exactly as it appears below. You will not need to use this code after youve completed the sign-up process. If you do not sign up before the expiration date, you must request a new code. [employer] Access Code: Activation code not generated Current [employer] Status: Active (This is the date your [employer] access code will ) 4. Enter the last four digits of your Social Security Number (xxxx) and Date of Birth (mm/dd/yyyy) as indicated and click Submit. You will be taken to the next sign-up page. 5. Create a [employer] ID. This will be your [employer] login ID and cannot be changed, so think of one that is secure and easy to remember. 6. Create a [employer] password. You can change your password at any time. 7. Enter your Password Reset Question and Answer. This can be used at a later time if you forget your password. 8. Enter your e-mail address. You will receive e-mail notification when new information is available in [address] 9. Click Sign Up. You can now view and download portions of your medical record. 10. Click the Download Summary menu link to download a portable copy of your medical information. Additional Information If you have questions, please call [phone]. Remember, [employer] is NOT to be used for urgent needs. For medical emergencies, dial 911.

## 2020-06-16 NOTE — PROGRESS NOTES
[facility] [place], [address]  Tel.  [phone]  Fax. [facility] [address]  [place], [address] [facility]  Tel.  [phone]  Fax. [phone] [address] [facility] Sheela Gibson   Tel.  [phone]  Fax. [phone]         Subjective:    Emmanuel Olivas is a 75 y.o. female who was seen by synchronous (real-time) audio-video technology on 6/16/2020. Consent:  She is aware that this patient-initiated Telehealth encounter is a billable service, with coverage as determined by her insurance carrier. She is aware that she may receive a bill and has provided verbal consent to proceed: Yes    I was at home while conducting this encounter. Patient verified with 's License. She complains of snoring associated with snorting, periods of not breathing, excessive daytime sleepiness, falling asleep while reading, watching television, during conversation. Symptoms began several years ago, gradually worsening since that time. She usually can fall asleep in 5 minutes. Family or house members note snoring, periods of not breathing. She denies completely or partially paralyzed while falling asleep or waking up. Other remarks: She reports of a previous diagnosis of AVTAR treated with PAP therapy about 20 years previously. Mobile Sleepiness Score: 18   and Modified F.O.S.Q. Score Total / 2: 12.5      Allergies   Allergen Reactions    Gluten Other (comments)    Other Medication Other (comments)    Chloromycetin [Chloramphenicol Sod Succinate] Other (comments)     Anemia           Current Outpatient Medications:     calcium citrate/vitamin D3 (CITRACAL + D PO), Take  by mouth., Disp: , Rfl:     zinc 50 mg tab tablet, Take  by mouth daily. , Disp: , Rfl:     atorvastatin (LIPITOR) 10 mg tablet, take 1 tablet by mouth once daily, Disp: 90 Tab, Rfl: 1    diclofenac EC (VOLTAREN) 75 mg EC tablet, Take 1 Tab by mouth two (2) times a day.  Indications: prn, Disp: 180 Tab, Rfl: 1    diphenhydrAMINE (BENADRYL) 25 mg capsule, Take 25 mg by mouth every four (4) hours as needed. , Disp: , Rfl:     MAGNESIUM CITRATE PO, Take 1,000 Caps by mouth daily. , Disp: , Rfl:     EPINEPHrine (EPIPEN 2-ASHLEY) 0.3 mg/0.3 mL (1:1,000) injection, 0.3 mL by IntraMUSCular route once as needed for up to 1 dose., Disp: 1 Syringe, Rfl: 2    acetaminophen (TYLENOL) 325 mg tablet, Take 325 mg by mouth as needed for Pain. Indications: PAIN, head, Disp: , Rfl:     aspirin 81 mg chewable tablet, Take 81 mg by mouth daily. Indications: heart palpitations, Disp: , Rfl:      She  has a past medical history of High cholesterol, Hypertension, Ill-defined condition (2015), and Musculoskeletal disorder. She  has a past surgical history that includes hx appendectomy; hx tonsillectomy; hx abdominal laparoscopy; hx tubal ligation (1989); and pr ins new/rplcmt prm pm w/transv eltrd atrial&vent (N/A, 7/26/2019). She family history includes Diabetes in her father and mother; Heart Disease in her father and mother; Hypertension in her father and mother. She  reports that she has been smoking cigarettes. She has a 21.00 pack-year smoking history. She has never used smokeless tobacco. She reports current alcohol use. She reports that she does not use drugs.      Review of Systems:  Constitutional:  No significant weight loss or weight gain  Eyes:  No blurred vision  CVS:  No significant chest pain  Pulm:  No significant shortness of breath  GI:  No significant nausea or vomiting  :  No significant nocturia  Musculoskeletal:  significant joint pain at night  Skin:  No significant rashes  Neuro:  No significant dizziness   Psych:  No active mood issues    Sleep Review of Systems: notable for no difficulty falling asleep; infrequent awakenings at night;  regular dreaming noted; no nightmares ; occasional early morning headaches; no memory problems; no concentration issues; no history of any automobile or occupational accidents due to daytime drowsiness. Objective:     Patient-Reported Vitals 6/16/2020   Patient-Reported Weight 235 lbs   Patient-Reported Height 5' 4'   Patient-Reported Pulse 76   Patient-Reported Systolic  948   Patient-Reported Diastolic 85       Physical Exam completed by visual and auditory observation of patient with verbal input from patient. General:   Alert, oriented, not in acute distress   Eyes:  Anicteric Sclerae; no obvious strabismus   Nose:  No obvious nasal septum deviation    Neck:   Midline trachea, no visible mass   Chest/Lungs:  Respiratory effort normal, no visualized signs of difficulty breathing or respiratory distress   CVS:  No JVD   Extremities:  No obvious rashes noted on face, neck, or hands   Neuro:  No facial asymmetry, no focal deficits; no obvious tremor    Psych:  Normal affect,  normal countenance       Assessment:       ICD-10-CM ICD-9-CM    1. AVTAR (obstructive sleep apnea) G47.33 327.23 SLEEP STUDY UNATTENDED, 4 CHANNEL   2. Essential hypertension I10 401.9    3. BMI 40.0-44.9, adult ([facility] 75.) Z68.41 V85.41          Plan:        Sleep testing was ordered for initial evaluation.  She was provided information on sleep apnea including coresponding risk factors and the importance of proper treatment.  Treatment options if indicated were reviewed today. Patient agrees to a trial of PAP therapy if indicated.  Counseling was provided regarding proper sleep hygiene, appropriate sleep schedule, need for sleep environment safety and safe driving.  Recommended a dedicated weight loss program through appropriate diet and exercise regimen as significant weight reduction has been shown to reduce severity of obstructive sleep apnea. Patient's phone number [phone] (home)  was reviewed and confirmed for accuracy. She gives permission for messages regarding results and appointments to be left at that number.     Pursuant to the emergency declaration under the [facility], [address]103 waiver authority and the [employer] Act, this Virtual Visit was conducted, with patient's consent, to reduce the patient's risk of exposure to COVID-19 and provide continuity of care for an established patient. Services were provided through a video synchronous discussion virtually to substitute for in-person clinic visit. Erika Lefort, MD, [facility]  Electronically signed.  06/16/20

## 2020-07-29 NOTE — PERIOP NOTES
[address]  Endoscopy Preprocedure Instructions      1. On the day of your surgery, please report to registration located on the 2nd floor of the  [facility]. yes    2. You must have a responsible adult to drive you to the hospital, stay at the hospital during your procedure and drive you home. If they leave your procedure will not be started (no exceptions). yes    3. Do not have anything to eat or drink (including water, gum, mints, coffee, and juice) after midnight. This does not apply to the medications you were instructed to take by your physician. yesIf you are currently taking Plavix, Coumadin, Aspirin, or other blood-thinning agents, contact your physician for special instructions. not applicable,    4. If you are having a procedure that requires bowel prep: We recommend that you have only clear liquids the day before your procedure and begin your bowel prep by 5:00 pm.  You may continue to drink clear liquids until midnight. If for any reason you are not able to complete your prep please notify your physician immediately. yes    5. Have a list of all current medications, including vitamins, herbal supplements and any other over the counter medications. yes    6. If you wear glasses, contacts, dentures and/or hearing aids, they may be removed prior to procedure, please bring a case to store them in. yes    7. You should understand that if you do not follow these instructions your procedure may be cancelled. If your physical condition changes (I.e. fever, cold or flu) please contact your doctor as soon as possible. 8. It is important that you be on time.   If for any reason you are unable to keep your appointment please call [phone] the day of or your physicians office prior to your scheduled procedure

## 2020-07-31 ENCOUNTER — HOSPITAL ENCOUNTER (OUTPATIENT)
Dept: LAB | Age: 65
Discharge: HOME | End: 2020-07-31
Payer: MEDICARE

## 2020-07-31 ENCOUNTER — TELEPHONE (OUTPATIENT)
Dept: CARDIOLOGY CLINIC | Age: 65
End: 2020-07-31

## 2020-07-31 DIAGNOSIS — U07.1 COVID-19: ICD-10-CM

## 2020-07-31 PROCEDURE — 87635 SARS-COV-2 COVID-19 AMP PRB: CPT

## 2020-07-31 NOTE — TELEPHONE ENCOUNTER
Faxed Device management Form to [facility] at fax number [phone]. Fax confirmation received.

## 2020-08-01 LAB — SARS-COV-2, COV2NT: NOT DETECTED

## 2020-08-04 ENCOUNTER — ANESTHESIA (OUTPATIENT)
Dept: ENDOSCOPY | Age: 65
End: 2020-08-04
Payer: MEDICARE

## 2020-08-04 ENCOUNTER — ANESTHESIA EVENT (OUTPATIENT)
Dept: ENDOSCOPY | Age: 65
End: 2020-08-04
Payer: MEDICARE

## 2020-08-04 ENCOUNTER — HOSPITAL ENCOUNTER (OUTPATIENT)
Age: 65
Setting detail: OUTPATIENT SURGERY
Discharge: HOME | End: 2020-08-04
Attending: INTERNAL MEDICINE | Admitting: INTERNAL MEDICINE
Payer: MEDICARE

## 2020-08-04 VITALS
WEIGHT: 238.98 LBS | DIASTOLIC BLOOD PRESSURE: 69 MMHG | TEMPERATURE: 98 F | OXYGEN SATURATION: 97 % | SYSTOLIC BLOOD PRESSURE: 147 MMHG | RESPIRATION RATE: 12 BRPM | HEART RATE: 60 BPM | BODY MASS INDEX: 40.8 KG/M2 | HEIGHT: 64 IN

## 2020-08-04 LAB — COLONOSCOPY, EXTERNAL: NORMAL

## 2020-08-04 PROCEDURE — 88305 TISSUE EXAM BY PATHOLOGIST: CPT

## 2020-08-04 PROCEDURE — 76060000031 HC ANESTHESIA FIRST 0.5 HR: Performed by: INTERNAL MEDICINE

## 2020-08-04 PROCEDURE — 77030013992 HC SNR POLYP ENDOSC BSC -B: Performed by: INTERNAL MEDICINE

## 2020-08-04 PROCEDURE — 74011250636 HC RX REV CODE- 250/636: Performed by: NURSE ANESTHETIST, CERTIFIED REGISTERED

## 2020-08-04 PROCEDURE — 76040000019: Performed by: INTERNAL MEDICINE

## 2020-08-04 PROCEDURE — 74011000250 HC RX REV CODE- 250: Performed by: NURSE ANESTHETIST, CERTIFIED REGISTERED

## 2020-08-04 RX ORDER — LIDOCAINE HYDROCHLORIDE 20 MG/ML
INJECTION, SOLUTION EPIDURAL; INFILTRATION; INTRACAUDAL; PERINEURAL AS NEEDED
Status: DISCONTINUED | OUTPATIENT
Start: 2020-08-04 | End: 2020-08-04 | Stop reason: HOSPADM

## 2020-08-04 RX ORDER — ATROPINE SULFATE 0.1 MG/ML
0.4 INJECTION INTRAVENOUS
Status: DISCONTINUED | OUTPATIENT
Start: 2020-08-04 | End: 2020-08-04 | Stop reason: HOSPADM

## 2020-08-04 RX ORDER — PROPOFOL 10 MG/ML
INJECTION, EMULSION INTRAVENOUS
Status: DISCONTINUED | OUTPATIENT
Start: 2020-08-04 | End: 2020-08-04 | Stop reason: HOSPADM

## 2020-08-04 RX ORDER — LISINOPRIL AND HYDROCHLOROTHIAZIDE 20; 25 MG/1; MG/1
TABLET ORAL DAILY
COMMUNITY
End: 2020-11-16

## 2020-08-04 RX ORDER — EPINEPHRINE 0.1 MG/ML
1 INJECTION INTRACARDIAC; INTRAVENOUS
Status: DISCONTINUED | OUTPATIENT
Start: 2020-08-04 | End: 2020-08-04 | Stop reason: HOSPADM

## 2020-08-04 RX ORDER — DEXTROMETHORPHAN/PSEUDOEPHED 2.5-7.5/.8
1.2 DROPS ORAL
Status: DISCONTINUED | OUTPATIENT
Start: 2020-08-04 | End: 2020-08-04 | Stop reason: HOSPADM

## 2020-08-04 RX ORDER — FLUMAZENIL 0.1 MG/ML
0.2 INJECTION INTRAVENOUS
Status: DISCONTINUED | OUTPATIENT
Start: 2020-08-04 | End: 2020-08-04 | Stop reason: HOSPADM

## 2020-08-04 RX ORDER — SODIUM CHLORIDE 9 MG/ML
INJECTION, SOLUTION INTRAVENOUS
Status: DISCONTINUED | OUTPATIENT
Start: 2020-08-04 | End: 2020-08-04 | Stop reason: HOSPADM

## 2020-08-04 RX ORDER — SODIUM CHLORIDE 9 MG/ML
50 INJECTION, SOLUTION INTRAVENOUS CONTINUOUS
Status: DISCONTINUED | OUTPATIENT
Start: 2020-08-04 | End: 2020-08-04 | Stop reason: HOSPADM

## 2020-08-04 RX ORDER — GLYCOPYRROLATE 0.2 MG/ML
INJECTION INTRAMUSCULAR; INTRAVENOUS AS NEEDED
Status: DISCONTINUED | OUTPATIENT
Start: 2020-08-04 | End: 2020-08-04 | Stop reason: HOSPADM

## 2020-08-04 RX ORDER — MIDAZOLAM HYDROCHLORIDE 1 MG/ML
.25-5 INJECTION, SOLUTION INTRAMUSCULAR; INTRAVENOUS
Status: DISCONTINUED | OUTPATIENT
Start: 2020-08-04 | End: 2020-08-04 | Stop reason: HOSPADM

## 2020-08-04 RX ORDER — PROPOFOL 10 MG/ML
INJECTION, EMULSION INTRAVENOUS AS NEEDED
Status: DISCONTINUED | OUTPATIENT
Start: 2020-08-04 | End: 2020-08-04 | Stop reason: HOSPADM

## 2020-08-04 RX ORDER — NALOXONE HYDROCHLORIDE 0.4 MG/ML
0.4 INJECTION, SOLUTION INTRAMUSCULAR; INTRAVENOUS; SUBCUTANEOUS
Status: DISCONTINUED | OUTPATIENT
Start: 2020-08-04 | End: 2020-08-04 | Stop reason: HOSPADM

## 2020-08-04 RX ADMIN — SODIUM CHLORIDE: 9 INJECTION, SOLUTION INTRAVENOUS at 10:40

## 2020-08-04 RX ADMIN — PROPOFOL 100 MCG/KG/MIN: 10 INJECTION, EMULSION INTRAVENOUS at 10:42

## 2020-08-04 RX ADMIN — PROPOFOL 50 MG: 10 INJECTION, EMULSION INTRAVENOUS at 10:55

## 2020-08-04 RX ADMIN — PROPOFOL 50 MG: 10 INJECTION, EMULSION INTRAVENOUS at 10:43

## 2020-08-04 RX ADMIN — GLYCOPYRROLATE 0.1 MG: 0.2 INJECTION, SOLUTION INTRAMUSCULAR; INTRAVENOUS at 10:46

## 2020-08-04 RX ADMIN — LIDOCAINE HYDROCHLORIDE 40 MG: 20 INJECTION, SOLUTION INTRAVENOUS at 10:42

## 2020-08-04 NOTE — ANESTHESIA POSTPROCEDURE EVALUATION
Procedure(s):  COLONOSCOPY  ENDOSCOPIC POLYPECTOMY. MAC    Anesthesia Post Evaluation      Multimodal analgesia: multimodal analgesia not used between 6 hours prior to anesthesia start to PACU discharge  Patient location during evaluation: PACU  Patient participation: complete - patient participated  Level of consciousness: awake and alert  Pain score: 0  Pain management: adequate  Airway patency: patent  Anesthetic complications: no  Cardiovascular status: hemodynamically stable and acceptable  Respiratory status: acceptable  Hydration status: acceptable  Comments: Patient seen and evaluated; no concerns. Post anesthesia nausea and vomiting:  none      INITIAL Post-op Vital signs:   Vitals Value Taken Time   /88 8/4/2020 11:32 AM   Temp 36.7 °C (98 °F) 8/4/2020 11:07 AM   Pulse 62 8/4/2020 11:34 AM   Resp 11 8/4/2020 11:34 AM   SpO2 98 % 8/4/2020 11:34 AM   Vitals shown include unvalidated device data.

## 2020-08-04 NOTE — ANESTHESIA PREPROCEDURE EVALUATION
Relevant Problems   No relevant active problems       Anesthetic History   No history of anesthetic complications            Review of Systems / Medical History  Patient summary reviewed and nursing notes reviewed    Pulmonary                   Neuro/Psych              Cardiovascular    Hypertension          Pacemaker and hyperlipidemia    Exercise tolerance: >4 METS  Comments: Pacemaker 7/2019 for Sick Sinus Syndrome   GI/Hepatic/Renal                Endo/Other        Morbid obesity and arthritis     Other Findings   Comments: Screening colonoscopy           Physical Exam    Airway  Mallampati: III    Neck ROM: normal range of motion   Mouth opening: Normal     Cardiovascular    Rhythm: regular  Rate: normal         Dental    Dentition: Upper partial plate and Lower partial plate     Pulmonary  Breath sounds clear to auscultation               Abdominal         Other Findings            Anesthetic Plan    ASA: 3  Anesthesia type: MAC          Induction: Intravenous  Anesthetic plan and risks discussed with: Patient      Informed consent obtained.

## 2020-08-04 NOTE — PERIOP NOTES
1042  Anesthesia staff at patient's bedside administering anesthesia and monitoring patients vital signs throughout procedure. See anesthesia note. 1101  Endoscope was pre-cleaned at bedside immediately following procedure by Nimo Maloney RN, endo tech. 1105  Patient tolerated procedure without problems. Abdomen soft and patient arousable and voices no complaints Report received from CRNA, see anesthesia note. Patient transported to endoscopy recovery area. Report given to FRANCISCO CARD RN.

## 2020-08-04 NOTE — PROGRESS NOTES
Maria De Jesus Ledesma  1955  [id number]    Situation:  Verbal report received from:   Manda Lockwood RN   Procedure: Procedure(s):  COLONOSCOPY  ENDOSCOPIC POLYPECTOMY    Background:    Preoperative diagnosis: HISTORY OF COLON POLYPS  Postoperative diagnosis: Diverticulosis,     :  Dr. Caroline Izaguirre  Assistant(s): Endoscopy RN-1: Marycarmen Gupta RN  Endoscopy RN-2: Kathleen Noonan RN    Specimens:   ID Type Source Tests Collected by Time Destination   1 : ascending colon polyps Preservative Colon, Ascending  Amanda Diaz MD 8/4/2020 1052 Pathology   2 : sigmoid colon polyps Preservative Sigmoid  Amanda Diaz MD 8/4/2020 1101 Pathology     H. Pylori  no    Assessment:  Intra-procedure medications       Anesthesia gave intra-procedure sedation and medications, see anesthesia flow sheet yes    Intravenous fluids: NS@ KVO     Vital signs stable   yes    Abdominal assessment: round and soft   yes    Recommendation:  Discharge patient per MD order  yes.   Return to floor  outpatient  Family or Friend   Daughter   Permission to share finding with family or friend yes

## 2020-08-04 NOTE — PROGRESS NOTES
[address] August 4, 2020             RE: Clinton Maylin          To Whom It May Concern,    This is to certify that Nydia Lozano was the  for the above named patient today. Please feel free to contact my office if you have any questions or concerns. Thank you for your assistance in this matter.       Sincerely,  Dr. Nithya Bahena   [phone]

## 2020-08-04 NOTE — H&P
Eva Sanchez M.D.  [phone]            History and Physical       NAME:  Merry Mendiola   :   1955   MRN:   [id number]       Referring Physician:  Tila Shea MD      Consult Date: 2020 9:56 AM    Chief Complaint:  Colon cancer screening    History of Present Illness:  Patient is a 75 y.o. who is seen for colon cancer screening. Denies any ongoing GI complaints. PMH:  Past Medical History:   Diagnosis Date    Arrhythmia     sick sinus syndrome-has demand pacemaker    Arthritis     osteoarthritis-left foot and knee    High cholesterol     Hypertension     Ill-defined condition     frontal lobe bleed    Musculoskeletal disorder        PSH:  Past Surgical History:   Procedure Laterality Date    HX ABDOMINAL LAPAROSCOPY      HX APPENDECTOMY      HX TONSILLECTOMY      HX TUBAL LIGATION      NH INS NEW/RPLCMT PRM PM W/TRANSV ELTRD ATRIAL&VENT N/A 2019    INSERT PPM DUAL performed by Sravanthi Baez MD at [address] CATH LAB       Allergies: Allergies   Allergen Reactions    Gluten Other (comments)    Other Medication Other (comments)    Chloromycetin [Chloramphenicol Sod Succinate] Other (comments)     Anemia         Home Medications:  Prior to Admission Medications   Prescriptions Last Dose Informant Patient Reported? Taking? EPINEPHrine (EPIPEN 2-ASHLEY) 0.3 mg/0.3 mL (1:1,000) injection Not Taking at Unknown time  No No   Si.3 mL by IntraMUSCular route once as needed for up to 1 dose. MAGNESIUM CITRATE PO 2020  Yes Yes   Sig: Take 1,000 Caps by mouth daily. acetaminophen (TYLENOL) 325 mg tablet 2020  Yes Yes   Sig: Take 325 mg by mouth as needed for Pain. Indications: PAIN, head   aspirin 81 mg chewable tablet 8/3/2020 at am  Yes Yes   Sig: Take 81 mg by mouth daily.  Indications: heart palpitations   atorvastatin (LIPITOR) 10 mg tablet 8/3/2020 at pm  No Yes   Sig: take 1 tablet by mouth once daily   calcium citrate/vitamin D3 (CITRACAL + D PO) 8/2/2020  Yes Yes   Sig: Take  by mouth. diclofenac EC (VOLTAREN) 75 mg EC tablet 8/4/2020 at 0530  No Yes   Sig: Take 1 Tab by mouth two (2) times a day. Indications: prn   diphenhydrAMINE (BENADRYL) 25 mg capsule 8/2/2020  Yes Yes   Sig: Take 25 mg by mouth every four (4) hours as needed for Allergies. lisinopril-hydroCHLOROthiazide (PRINZIDE, ZESTORETIC) 20-25 mg per tablet 8/4/2020 at 0530  Yes Yes   Sig: Take  by mouth daily. zinc 50 mg tab tablet 8/3/2020 at am  Yes Yes   Sig: Take  by mouth daily. Facility-Administered Medications: None       Hospital Medications:  No current facility-administered medications for this encounter.         Social History:  Social History     Tobacco Use    Smoking status: Current Every Day Smoker     Packs/day: 0.50     Years: 42.00     Pack years: 21.00     Types: Cigarettes    Smokeless tobacco: Never Used   Substance Use Topics    Alcohol use: Not Currently     Frequency: Monthly or less     Drinks per session: 1 or 2     Binge frequency: Never       Family History:  Family History   Problem Relation Age of Onset    Heart Disease Mother     Hypertension Mother     Diabetes Mother     Heart Disease Father     Diabetes Father     Hypertension Father              Review of Systems:      Constitutional: negative fever, negative chills, negative weight loss  Eyes:   negative visual changes  ENT:   negative sore throat, tongue or lip swelling  Respiratory:  negative cough, negative dyspnea  Cards:  negative for chest pain, palpitations, lower extremity edema  GI:   See HPI  :  negative for frequency, dysuria  Integument:  negative for rash and pruritus  Heme:  negative for easy bruising and gum/nose bleeding  Musculoskel: negative for myalgias,  back pain and muscle weakness  Neuro: negative for headaches, dizziness, vertigo  Psych:  negative for feelings of anxiety, depression       Objective:     Patient Vitals for the past 8 hrs:   BP Temp Pulse Resp Height Weight   08/04/20 0930 154/73 99 °F (37.2 °C) 65 11 5' 4\" (1.626 m) 108.4 kg (238 lb 15.7 oz)     No intake/output data recorded. No intake/output data recorded. EXAM:     NEURO-a&o   HEENT-wnl   LUNGS-clear    COR-regular rate and rhythym     ABD-soft , no tenderness, no rebound, good bs     EXT-no edema     Data Review     No results for input(s): WBC, HGB, HCT, PLT, HGBEXT, HCTEXT, PLTEXT in the last 72 hours. No results for input(s): NA, K, CL, CO2, BUN, CREA, GLU, PHOS, CA in the last 72 hours. No results for input(s): AP, TBIL, TP, ALB, GLOB, GGT, AML, LPSE in the last 72 hours. No lab exists for component: SGOT, GPT, AMYP, HLPSE  No results for input(s): INR, PTP, APTT, INREXT in the last 72 hours. Patient Active Problem List   Diagnosis Code    Hypertension I10    Obesity, morbid (HCC) E66.01    Subcutaneous masses, trunk R22.9    Sick sinus syndrome (HCC) I49.5    Pacemaker Z95.0    Family history of melanoma Z80.8    Tobacco use Z72.0    Hx of anaphylaxis Z87.892    Hemorrhoids K64.9    AVTAR (obstructive sleep apnea) G47.33      Assessment:   · H/o polyps   Plan:   · Colonoscopy today.      Signed By: Emely Lo MD     8/4/2020  9:56 AM

## 2020-08-04 NOTE — PROCEDURES
Leidy King M.D.  [phone]            2020          Colonoscopy Operative Report  Darien Schuler  :  1955  [place] Medical Record Number:  [id number]      Indications:    Personal history of colon polyps (screening only)     :  Scottie Pedroza MD    Assistant -- None  Implants -- None    Referring Provider: Hadley Roberto MD    Sedation:  MAC anesthesia Propofol    Pre-Procedural Exam:      Airway: clear,  No airway problems anticipated  Heart: RRR, without gallops or rubs  Lungs: clear bilaterally without wheezes, crackles, or rhonchi  Abdomen: soft, nontender, nondistended, bowel sounds present  Mental Status: awake, alert and oriented to person, place and time     Procedure Details:  After informed consent was obtained with all risks and benefits of procedure explained and preoperative exam completed, the patient was taken to the endoscopy suite and placed in the left lateral decubitus position. Upon sequential sedation as per above, a digital rectal exam was performed. The Olympus videocolonoscope  was inserted in the rectum and carefully advanced to the terminal ileum. The quality of preparation was good. The colonoscope was slowly withdrawn with careful inspection and evaluation between folds. Retroflexion in the rectum was performed. Findings:   Terminal Ileum: normal  Cecum: normal  Ascending Colon: 2  Sessile polyp(s), the largest 6 mm in size;  Transverse Colon: normal  Descending Colon:     - Diverticulosis  Sigmoid: 3  Sessile polyp(s), the largest 6 mm in size;      - Diverticulosis  Rectum: normal    Interventions:  5 complete polypectomy were performed using cold snare  and the polyps were  retrieved    Specimen Removed:  specimen #1, 6 mm in size, located in the ascending colon and the sigmoid removed by cold snare and retrieved for pathology    Complications: None. EBL:  None.     Impression:  6 polyps removed as above Mild left colonic diverticulosis noted on exam    Recommendations:  -Await pathology. -If adenoma is present, repeat colonoscopy in 3 years.   -High fiber diet.    -Resume normal medication(s). Discharge Disposition:  Home in the company of a  when able to ambulate.     Yang Rodriguez MD  8/4/2020  11:03 AM

## 2020-08-04 NOTE — DISCHARGE INSTRUCTIONS
[address] [facility]. Lissette Sebastian M.D.  [phone]          COLON DISCHARGE INSTRUCTIONS       2020    Iván Quinteros  :  1955  [place] Medical Record Number:  [id number]      COLONOSCOPY FINDINGS:  Your colonoscopy showed: 5 polyps and diverticulosis. DISCOMFORT:  Redness at IV site- apply warm compress to area; if redness or soreness persist- contact your physician  There may be a slight amount of blood passed from the rectum  Gaseous discomfort- walking, belching will help relieve any discomfort  You may not operate a vehicle for 12 hours  You may not engage in an occupation involving machinery or appliances for rest of today  You may not drink alcoholic beverages for at least 12 hours  Avoid making any critical decisions for at least 24 hour  DIET:   High fiber diet. - however -  remember your colon is empty and a heavy meal will produce gas. Avoid these foods:  vegetables, fried / greasy foods, carbonated drinks for today     ACTIVITY:  You may resume your normal daily activities it is recommended that you spend the remainder of the day resting -  avoid any strenuous activity. CALL M.D. ANY SIGN OF:   Increasing pain, nausea, vomiting  Abdominal distension (swelling)  New increased bleeding (oral or rectal)  Fever (chills)  Pain in chest area  Bloody discharge from nose or mouth   Shortness of breath    Follow-up Instructions:   Call Dr. Alexus Padgett if any questions or problems. Telephone # [phone]  Biopsy results will be available in  5 to 7 days  Should have a repeat colonoscopy in 3-5 years.

## 2020-08-12 ENCOUNTER — OFFICE VISIT (OUTPATIENT)
Dept: CARDIOLOGY CLINIC | Age: 65
End: 2020-08-12
Payer: MEDICARE

## 2020-08-12 DIAGNOSIS — Z95.0 CARDIAC PACEMAKER IN SITU: Primary | ICD-10-CM

## 2020-08-12 PROCEDURE — 93294 REM INTERROG EVL PM/LDLS PM: CPT | Performed by: INTERNAL MEDICINE

## 2020-08-12 PROCEDURE — 93296 REM INTERROG EVL PM/IDS: CPT | Performed by: INTERNAL MEDICINE

## 2020-08-14 ENCOUNTER — TELEPHONE (OUTPATIENT)
Dept: FAMILY MEDICINE CLINIC | Age: 65
End: 2020-08-14

## 2020-08-14 NOTE — TELEPHONE ENCOUNTER
----- Message from Davon Bull sent at 8/11/2020  8:47 PM EDT -----  Regarding: Dr. Boyer Carry / telephone  Contact: [phone]  Caller's first and last name: n/a   Reason for call: Pt. needs to be scheduled for lab appt. in the morning.   Callback required yes/no and why: yes  Best contact number(s): [phone]  Details to clarify the request: n/a

## 2020-08-17 NOTE — TELEPHONE ENCOUNTER
Labs were ordered in March. Per VV in June pt was to schedule lab appt. She called in August to schedule the lab appt. Pt is due an OV in Sept.    LVM I see that she is on mychart and we can get her scheduled that way and it would be easier than trying to call in. Left another VM for the pt letting her know that she is due a f/u appt with Dr Stanislav Leahy and asked her to call back to get it scheduled.

## 2020-10-06 ENCOUNTER — APPOINTMENT (OUTPATIENT)
Dept: FAMILY MEDICINE CLINIC | Age: 65
End: 2020-10-06

## 2020-10-07 LAB
ALBUMIN SERPL-MCNC: 4.2 G/DL (ref 3.8–4.8)
ALBUMIN/GLOB SERPL: 2.2 {RATIO} (ref 1.2–2.2)
ALP SERPL-CCNC: 99 IU/L (ref 39–117)
ALT SERPL-CCNC: 18 IU/L (ref 0–32)
AST SERPL-CCNC: 14 IU/L (ref 0–40)
BASOPHILS # BLD AUTO: 0.1 X10E3/UL (ref 0–0.2)
BASOPHILS NFR BLD AUTO: 1 %
BILIRUB SERPL-MCNC: 0.5 MG/DL (ref 0–1.2)
BUN SERPL-MCNC: 15 MG/DL (ref 8–27)
BUN/CREAT SERPL: 19 (ref 12–28)
CALCIUM SERPL-MCNC: 9.4 MG/DL (ref 8.7–10.3)
CHLORIDE SERPL-SCNC: 107 MMOL/L (ref 96–106)
CHOLEST SERPL-MCNC: 199 MG/DL (ref 100–199)
CO2 SERPL-SCNC: 23 MMOL/L (ref 20–29)
CREAT SERPL-MCNC: 0.77 MG/DL (ref 0.57–1)
EOSINOPHIL # BLD AUTO: 0.2 X10E3/UL (ref 0–0.4)
EOSINOPHIL NFR BLD AUTO: 3 %
ERYTHROCYTE [DISTWIDTH] IN BLOOD BY AUTOMATED COUNT: 13.5 % (ref 11.7–15.4)
EST. AVERAGE GLUCOSE BLD GHB EST-MCNC: 108 MG/DL
GLOBULIN SER CALC-MCNC: 1.9 G/DL (ref 1.5–4.5)
GLUCOSE SERPL-MCNC: 101 MG/DL (ref 65–99)
HBA1C MFR BLD: 5.4 % (ref 4.8–5.6)
HCT VFR BLD AUTO: 43 % (ref 34–46.6)
HDLC SERPL-MCNC: 36 MG/DL
HGB BLD-MCNC: 14.2 G/DL (ref 11.1–15.9)
IMM GRANULOCYTES # BLD AUTO: 0 X10E3/UL (ref 0–0.1)
IMM GRANULOCYTES NFR BLD AUTO: 0 %
INTERPRETATION, 910389: NORMAL
LDLC SERPL CALC-MCNC: 132 MG/DL (ref 0–99)
LYMPHOCYTES # BLD AUTO: 2.2 X10E3/UL (ref 0.7–3.1)
LYMPHOCYTES NFR BLD AUTO: 27 %
MCH RBC QN AUTO: 30 PG (ref 26.6–33)
MCHC RBC AUTO-ENTMCNC: 33 G/DL (ref 31.5–35.7)
MCV RBC AUTO: 91 FL (ref 79–97)
MONOCYTES # BLD AUTO: 0.4 X10E3/UL (ref 0.1–0.9)
MONOCYTES NFR BLD AUTO: 5 %
NEUTROPHILS # BLD AUTO: 5.1 X10E3/UL (ref 1.4–7)
NEUTROPHILS NFR BLD AUTO: 64 %
PLATELET # BLD AUTO: 259 X10E3/UL (ref 150–450)
POTASSIUM SERPL-SCNC: 4.2 MMOL/L (ref 3.5–5.2)
PROT SERPL-MCNC: 6.1 G/DL (ref 6–8.5)
RBC # BLD AUTO: 4.73 X10E6/UL (ref 3.77–5.28)
SODIUM SERPL-SCNC: 141 MMOL/L (ref 134–144)
TRIGL SERPL-MCNC: 171 MG/DL (ref 0–149)
VLDLC SERPL CALC-MCNC: 31 MG/DL (ref 5–40)
WBC # BLD AUTO: 8.1 X10E3/UL (ref 3.4–10.8)

## 2020-11-16 ENCOUNTER — TELEPHONE (OUTPATIENT)
Dept: FAMILY MEDICINE CLINIC | Age: 65
End: 2020-11-16

## 2020-11-16 ENCOUNTER — OFFICE VISIT (OUTPATIENT)
Dept: CARDIOLOGY CLINIC | Age: 65
End: 2020-11-16
Payer: MEDICARE

## 2020-11-16 DIAGNOSIS — Z95.0 CARDIAC PACEMAKER IN SITU: Primary | ICD-10-CM

## 2020-11-16 PROCEDURE — 93294 REM INTERROG EVL PM/LDLS PM: CPT | Performed by: INTERNAL MEDICINE

## 2020-11-16 PROCEDURE — 93296 REM INTERROG EVL PM/IDS: CPT | Performed by: INTERNAL MEDICINE

## 2020-11-16 RX ORDER — LISINOPRIL AND HYDROCHLOROTHIAZIDE 20; 25 MG/1; MG/1
TABLET ORAL
Qty: 90 TAB | Refills: 0 | Status: SHIPPED | OUTPATIENT
Start: 2020-11-16 | End: 2021-06-21 | Stop reason: SDUPTHER

## 2020-11-16 NOTE — TELEPHONE ENCOUNTER
MD Nikita Edward,    Receiving fax request for lisinopril/hctz 20-25mg tabs. D/C'd per last visit on VV 6/9/20 but still on her current med list.  Not sure if active or not? Thanks, Elizabeth Alvarenga already pending.       Last Visit: VV 6/9/20  MD Nikita Edward, labs 10/6/20  Next Appointment: Not scheduled  Previous Refill Encounter(s): Historical provider

## 2020-11-16 NOTE — TELEPHONE ENCOUNTER
Chart review shows that rx was refilled on by cardiology. Will defer to cardiology.      Mulugeta Gaona MD

## 2021-02-16 ENCOUNTER — HOSPITAL ENCOUNTER (EMERGENCY)
Age: 66
Discharge: HOME | End: 2021-02-16
Attending: EMERGENCY MEDICINE | Admitting: EMERGENCY MEDICINE
Payer: MEDICARE

## 2021-02-16 ENCOUNTER — APPOINTMENT (OUTPATIENT)
Dept: CT IMAGING | Age: 66
End: 2021-02-16
Attending: PHYSICIAN ASSISTANT
Payer: MEDICARE

## 2021-02-16 VITALS
SYSTOLIC BLOOD PRESSURE: 186 MMHG | RESPIRATION RATE: 17 BRPM | DIASTOLIC BLOOD PRESSURE: 106 MMHG | TEMPERATURE: 98.6 F | HEART RATE: 73 BPM | OXYGEN SATURATION: 94 %

## 2021-02-16 DIAGNOSIS — N20.0 KIDNEY STONE: Primary | ICD-10-CM

## 2021-02-16 DIAGNOSIS — N39.0 URINARY TRACT INFECTION WITHOUT HEMATURIA, SITE UNSPECIFIED: ICD-10-CM

## 2021-02-16 LAB
ALBUMIN SERPL-MCNC: 3.8 G/DL (ref 3.5–5)
ALBUMIN/GLOB SERPL: 1 {RATIO} (ref 1.1–2.2)
ALP SERPL-CCNC: 106 U/L (ref 45–117)
ALT SERPL-CCNC: 24 U/L (ref 12–78)
ANION GAP SERPL CALC-SCNC: 5 MMOL/L (ref 5–15)
APPEARANCE UR: CLEAR
APPEARANCE UR: CLEAR
AST SERPL-CCNC: 16 U/L (ref 15–37)
BACTERIA URNS QL MICRO: ABNORMAL /HPF
BACTERIA URNS QL MICRO: NEGATIVE /HPF
BASOPHILS # BLD: 0.1 K/UL (ref 0–0.1)
BASOPHILS NFR BLD: 1 % (ref 0–1)
BILIRUB SERPL-MCNC: 0.4 MG/DL (ref 0.2–1)
BILIRUB UR QL CFM: NEGATIVE
BILIRUB UR QL CFM: NEGATIVE
BUN SERPL-MCNC: 14 MG/DL (ref 6–20)
BUN/CREAT SERPL: 13 (ref 12–20)
CALCIUM SERPL-MCNC: 9.9 MG/DL (ref 8.5–10.1)
CHLORIDE SERPL-SCNC: 110 MMOL/L (ref 97–108)
CO2 SERPL-SCNC: 24 MMOL/L (ref 21–32)
COLOR UR: ABNORMAL
COLOR UR: ABNORMAL
COMMENT, HOLDF: NORMAL
CREAT SERPL-MCNC: 1.04 MG/DL (ref 0.55–1.02)
DIFFERENTIAL METHOD BLD: NORMAL
EOSINOPHIL # BLD: 0.2 K/UL (ref 0–0.4)
EOSINOPHIL NFR BLD: 2 % (ref 0–7)
EPITH CASTS URNS QL MICRO: ABNORMAL /LPF
EPITH CASTS URNS QL MICRO: ABNORMAL /LPF
ERYTHROCYTE [DISTWIDTH] IN BLOOD BY AUTOMATED COUNT: 13.1 % (ref 11.5–14.5)
GLOBULIN SER CALC-MCNC: 4 G/DL (ref 2–4)
GLUCOSE SERPL-MCNC: 128 MG/DL (ref 65–100)
GLUCOSE UR STRIP.AUTO-MCNC: NEGATIVE MG/DL
GLUCOSE UR STRIP.AUTO-MCNC: NEGATIVE MG/DL
HCT VFR BLD AUTO: 43.4 % (ref 35–47)
HGB BLD-MCNC: 15 G/DL (ref 11.5–16)
HGB UR QL STRIP: ABNORMAL
HGB UR QL STRIP: ABNORMAL
HYALINE CASTS URNS QL MICRO: ABNORMAL /LPF (ref 0–5)
HYALINE CASTS URNS QL MICRO: ABNORMAL /LPF (ref 0–5)
IMM GRANULOCYTES # BLD AUTO: 0 K/UL (ref 0–0.04)
IMM GRANULOCYTES NFR BLD AUTO: 0 % (ref 0–0.5)
KETONES UR QL STRIP.AUTO: NEGATIVE MG/DL
KETONES UR QL STRIP.AUTO: NEGATIVE MG/DL
LEUKOCYTE ESTERASE UR QL STRIP.AUTO: ABNORMAL
LEUKOCYTE ESTERASE UR QL STRIP.AUTO: ABNORMAL
LYMPHOCYTES # BLD: 2.9 K/UL (ref 0.8–3.5)
LYMPHOCYTES NFR BLD: 28 % (ref 12–49)
MCH RBC QN AUTO: 29.6 PG (ref 26–34)
MCHC RBC AUTO-ENTMCNC: 34.6 G/DL (ref 30–36.5)
MCV RBC AUTO: 85.8 FL (ref 80–99)
MONOCYTES # BLD: 0.5 K/UL (ref 0–1)
MONOCYTES NFR BLD: 5 % (ref 5–13)
NEUTS SEG # BLD: 6.5 K/UL (ref 1.8–8)
NEUTS SEG NFR BLD: 64 % (ref 32–75)
NITRITE UR QL STRIP.AUTO: POSITIVE
NITRITE UR QL STRIP.AUTO: POSITIVE
NRBC # BLD: 0 K/UL (ref 0–0.01)
NRBC BLD-RTO: 0 PER 100 WBC
PH UR STRIP: 5 [PH] (ref 5–8)
PH UR STRIP: 5 [PH] (ref 5–8)
PLATELET # BLD AUTO: 285 K/UL (ref 150–400)
PMV BLD AUTO: 10.4 FL (ref 8.9–12.9)
POTASSIUM SERPL-SCNC: 3.6 MMOL/L (ref 3.5–5.1)
PROT SERPL-MCNC: 7.8 G/DL (ref 6.4–8.2)
PROT UR STRIP-MCNC: 30 MG/DL
PROT UR STRIP-MCNC: ABNORMAL MG/DL
RBC # BLD AUTO: 5.06 M/UL (ref 3.8–5.2)
RBC #/AREA URNS HPF: ABNORMAL /HPF (ref 0–5)
RBC #/AREA URNS HPF: ABNORMAL /HPF (ref 0–5)
SAMPLES BEING HELD,HOLD: NORMAL
SODIUM SERPL-SCNC: 139 MMOL/L (ref 136–145)
SP GR UR REFRACTOMETRY: 1.01 (ref 1–1.03)
SP GR UR REFRACTOMETRY: 1.02 (ref 1–1.03)
UA: UC IF INDICATED,UAUC: ABNORMAL
UR CULT HOLD, URHOLD: NORMAL
UROBILINOGEN UR QL STRIP.AUTO: 1 EU/DL (ref 0.2–1)
UROBILINOGEN UR QL STRIP.AUTO: 1 EU/DL (ref 0.2–1)
WBC # BLD AUTO: 10.1 K/UL (ref 3.6–11)
WBC URNS QL MICRO: ABNORMAL /HPF (ref 0–4)
WBC URNS QL MICRO: ABNORMAL /HPF (ref 0–4)

## 2021-02-16 PROCEDURE — 96374 THER/PROPH/DIAG INJ IV PUSH: CPT

## 2021-02-16 PROCEDURE — 81001 URINALYSIS AUTO W/SCOPE: CPT

## 2021-02-16 PROCEDURE — 36415 COLL VENOUS BLD VENIPUNCTURE: CPT

## 2021-02-16 PROCEDURE — 99284 EMERGENCY DEPT VISIT MOD MDM: CPT

## 2021-02-16 PROCEDURE — 80053 COMPREHEN METABOLIC PANEL: CPT

## 2021-02-16 PROCEDURE — 85025 COMPLETE CBC W/AUTO DIFF WBC: CPT

## 2021-02-16 PROCEDURE — 87086 URINE CULTURE/COLONY COUNT: CPT

## 2021-02-16 PROCEDURE — 74011250636 HC RX REV CODE- 250/636: Performed by: PHYSICIAN ASSISTANT

## 2021-02-16 PROCEDURE — 87186 SC STD MICRODIL/AGAR DIL: CPT

## 2021-02-16 PROCEDURE — 96375 TX/PRO/DX INJ NEW DRUG ADDON: CPT

## 2021-02-16 PROCEDURE — 74176 CT ABD & PELVIS W/O CONTRAST: CPT

## 2021-02-16 PROCEDURE — 87077 CULTURE AEROBIC IDENTIFY: CPT

## 2021-02-16 RX ORDER — ONDANSETRON 2 MG/ML
4 INJECTION INTRAMUSCULAR; INTRAVENOUS
Status: COMPLETED | OUTPATIENT
Start: 2021-02-16 | End: 2021-02-16

## 2021-02-16 RX ORDER — TAMSULOSIN HYDROCHLORIDE 0.4 MG/1
0.4 CAPSULE ORAL DAILY
Qty: 7 CAP | Refills: 0 | Status: SHIPPED | OUTPATIENT
Start: 2021-02-16 | End: 2021-02-23

## 2021-02-16 RX ORDER — SULFAMETHOXAZOLE AND TRIMETHOPRIM 800; 160 MG/1; MG/1
1 TABLET ORAL 2 TIMES DAILY
Qty: 6 TAB | Refills: 0 | Status: SHIPPED | OUTPATIENT
Start: 2021-02-16 | End: 2021-02-19

## 2021-02-16 RX ORDER — HYDROCODONE BITARTRATE AND ACETAMINOPHEN 5; 325 MG/1; MG/1
1 TABLET ORAL
Qty: 12 TAB | Refills: 0 | Status: SHIPPED | OUTPATIENT
Start: 2021-02-16 | End: 2021-02-19

## 2021-02-16 RX ORDER — KETOROLAC TROMETHAMINE 30 MG/ML
15 INJECTION, SOLUTION INTRAMUSCULAR; INTRAVENOUS
Status: COMPLETED | OUTPATIENT
Start: 2021-02-16 | End: 2021-02-16

## 2021-02-16 RX ORDER — ONDANSETRON 4 MG/1
4 TABLET, ORALLY DISINTEGRATING ORAL
Qty: 12 TAB | Refills: 0 | Status: SHIPPED | OUTPATIENT
Start: 2021-02-16 | End: 2021-06-21

## 2021-02-16 RX ORDER — CEPHALEXIN 500 MG/1
500 CAPSULE ORAL
Status: DISCONTINUED | OUTPATIENT
Start: 2021-02-16 | End: 2021-02-16

## 2021-02-16 RX ADMIN — ONDANSETRON 4 MG: 2 INJECTION INTRAMUSCULAR; INTRAVENOUS at 09:56

## 2021-02-16 RX ADMIN — KETOROLAC TROMETHAMINE 15 MG: 30 INJECTION, SOLUTION INTRAMUSCULAR at 09:56

## 2021-02-16 RX ADMIN — SODIUM CHLORIDE 1000 ML: 900 INJECTION, SOLUTION INTRAVENOUS at 09:56

## 2021-02-16 NOTE — ED TRIAGE NOTES
Arrives ambulatory for c/o left flank pain radiating anteriorly since this morning. Had episode of similar pain on Sunday but subsided on its own. Took azos yesterday and today without relief.

## 2021-02-16 NOTE — CONSULTS
2:01 PM  Repeat UA collected with straight cath suspicious for infection; however, still contaminated with epithelial cells. Recommend home with oral bactrim and follow culture, tailor therapy. Same plan as below. Discussed with ER NP. Requesting Provider: Calli Rodriguez MD - Reason for Consultation: Marsa Forget"  Pre-existing [facility] Patient:   No                Patient: Shauna Smith MRN: [id number]  SSN: [id number]    YOB: 1955  Age: 77 y.o. Sex: female     Location: 7/R37       Code Status: Prior   PCP: Lafayette Cogan, MD  - [phone]   Emergency Contact:  Primary Emergency Contact: Alma Rosa Imani, Bhaskar Phone: [phone]   Race/Caodaism/Language: WHITE OR Jordyn Regino / Surendra Likes / Marcos [place]: Payor: Oralia Frances / Plan: [facility] HMO / Product Type: Managed Care Medicare /    Prior Admission Data: 8/4/20 Shirley Fontanamack[address], Breanne Villareal   Hospitalized:  Hospital Day: 1 - Admitted 2/16/2021  9:17 AM     CONSULTANTS  IP CONSULT TO UROLOGY   ADMISSION DIAGNOSES  No diagnosis found. Assessment/Plan:       · Left distal ureteral stone at the UVJ  · Mild hydronephrosis  · Left flank pain  · ?UTI    - Pain currently resolved. It is possible that she has already passed the stone. It is likely that she can pass the stone spontaneously with MET.   - Repeat UA with straight cath one time for clean sample. - Okay to DC home with pain management, Flomax, and oral antibiotic if repeat UA+ with outpatient follow up at [facility]. She was informed to return to the ED with onset of fevers, chills, uncontrolled pain. - Strain all urine.   - FU scheduled on 2/23/21 at 2:10 PM at the [facility] location with Dr. Pardeep Marks.   - Please call with questions or concerns. [phone]. Case discussed with Dr. Pardeep Marks.       CC: Flank Pain and Nausea   HPI: She is a 77 y.o. female with PMH of HTN, HLD and sick sinus syndrome s/p pacemaker placement. Urology consulted for kidney stone. She is not known to [facility]. Seen and examined with Dr. Kailyn Chatman. She presents to the ED today with cc of left flank pain, sudden onset, now resolved. She reports a similar episode at 3 AM with dysuria that also resolved spontaneously. Associated N/V, now resolved. She was given a dose of toradol in the ED with relief. She recently finished a course of keflex for a UTI. She denies hx of kidney stones. Denies fevers, chills, dysuria or frequency. AFVSS. WBC wnl. Kidney function slightly elevated 1.04. UA suggestive of possible infection, however, shows contamination, celia in color. CT A/P images personally reviewed with Dr. Kailyn Chatman. Left punctate distal ureteral stone at the UVJ (not UPJ) with mild hydro. No other stones visualized. CT A/P WO  KIDNEYS/URETERS: Punctate stone left ureteropelvic junction with mild left  hydronephrosis.     IMPRESSION  Punctate stone left UPJ with mild left hydronephrosis. Multiple colonic lipomas. Temp (24hrs), Av.7 °F (36.5 °C), Min:96.7 °F (35.9 °C), Max:98.6 °F (37 °C)    Urinary Status: Voiding, Left flank pain  Creatinine   Date/Time Value Ref Range Status   2021 09:32 AM 1.04 (H) 0.55 - 1.02 MG/DL Final   10/06/2020 09:33 AM 0.77 0.57 - 1.00 mg/dL Final   2019 07:55 AM 0.83 0.57 - 1.00 mg/dL Final   2019 08:53 AM 0.76 0.57 - 1.00 mg/dL Final   2018 05:13 PM 0.72 0.57 - 1.00 mg/dL Final     Current Antimicrobial Therapy (168h ago, onward)     Ordered     Start Stop    21 1201  cephALEXin (KEFLEX) capsule 500 mg  500 mg,   Oral,   NOW      21 1201 21 0001              Key Anti-Platelet Anticoagulant Meds             aspirin 81 mg chewable tablet Take 81 mg by mouth daily.  Indications: heart palpitations        Diet: No diet orders on file -       Labs     Lab Results   Component Value Date/Time    WBC 10.1 2021 09:32 AM    HCT 43.4 2021 09:32 AM PLATELET 011 30/52/6788 09:32 AM    Sodium 139 02/16/2021 09:32 AM    Potassium 3.6 02/16/2021 09:32 AM    Chloride 110 (H) 02/16/2021 09:32 AM    CO2 24 02/16/2021 09:32 AM    BUN 14 02/16/2021 09:32 AM    Creatinine 1.04 (H) 02/16/2021 09:32 AM    Glucose 128 (H) 02/16/2021 09:32 AM    Calcium 9.9 02/16/2021 09:32 AM    Magnesium 2.1 03/22/2011 12:15 PM    INR 1.0 07/23/2015 08:40 PM     UA:   Lab Results   Component Value Date/Time    Color DARK YELLOW 02/16/2021 10:38 AM    Appearance CLEAR 02/16/2021 10:38 AM    Specific gravity 1.024 02/16/2021 10:38 AM    pH (UA) 5.0 02/16/2021 10:38 AM    Protein 30 (A) 02/16/2021 10:38 AM    Glucose Negative 02/16/2021 10:38 AM    Ketone Negative 02/16/2021 10:38 AM    Urobilinogen 1.0 02/16/2021 10:38 AM    Nitrites Positive (A) 02/16/2021 10:38 AM    Leukocyte Esterase TRACE (A) 02/16/2021 10:38 AM    Epithelial cells FEW 02/16/2021 10:38 AM    Bacteria 4+ (A) 02/16/2021 10:38 AM    WBC 0-4 02/16/2021 10:38 AM    RBC  02/16/2021 10:38 AM     Imaging     Results for orders placed during the hospital encounter of 02/16/21   CT ABD PELV WO CONT    Narrative EXAM: CT ABD PELV WO CONT    INDICATION: left flank pain    COMPARISON: None    CONTRAST:  None. TECHNIQUE:   Thin axial images were obtained through the abdomen and pelvis. Coronal and  sagittal reformats were generated. Oral contrast was not administered. CT dose  reduction was achieved through use of a standardized protocol tailored for this  examination and automatic exposure control for dose modulation. The absence of intravenous contrast material reduces the sensitivity for  evaluation of the vasculature and solid organs. FINDINGS:   LOWER THORAX: No significant abnormality in the incidentally imaged lower chest.  LIVER: Hepatic steatosis. BILIARY TREE: Gallbladder is within normal limits. CBD is not dilated. SPLEEN: within normal limits. PANCREAS: No focal abnormality.   ADRENALS: Unremarkable. KIDNEYS/URETERS: Punctate stone left ureteropelvic junction with mild left  hydronephrosis. STOMACH: Unremarkable. SMALL BOWEL: No dilatation or wall thickening. COLON: There is suggestion of lipomas in the transverse, ascending, and sigmoid  colon. APPENDIX: Surgically absent. PERITONEUM: No ascites or pneumoperitoneum. RETROPERITONEUM: No lymphadenopathy or aortic aneurysm. REPRODUCTIVE ORGANS: There is no pelvic mass or lymphadenopathy. URINARY BLADDER: No mass or calculus. BONES: Degenerative changes are seen in the lumbar spine. Chronic compression  fracture of L4 is evident. ABDOMINAL WALL: No mass or hernia. ADDITIONAL COMMENTS: N/A      Impression Punctate stone left UPJ with mild left hydronephrosis. Multiple colonic lipomas. US Results (most recent):  No results found for this or any previous visit. Cultures     All Micro Results     Procedure Component Value Units Date/Time    CULTURE, URINE [[id number]] Collected: 02/16/21 1038    Order Status: Completed Specimen: Urine from Clean catch Updated: 02/16/21 1210    URINE CULTURE HOLD SAMPLE [[id number]] Collected: 02/16/21 1038    Order Status: Completed Specimen: Urine from Serum Updated: 02/16/21 1048     Urine culture hold       Urine on hold in Microbiology dept for 2 days. If unpreserved urine is submitted, it cannot be used for addtional testing after 24 hours, recollection will be required.                  Past History: (Complete 2+/3 areas)     Allergies   Allergen Reactions    Gluten Other (comments)    Chloromycetin [Chloramphenicol Sod Succinate] Other (comments)     Anemia        Current Facility-Administered Medications   Medication Dose Route Frequency    cephALEXin (KEFLEX) capsule 500 mg  500 mg Oral NOW     Current Outpatient Medications   Medication Sig    lisinopril-hydroCHLOROthiazide (PRINZIDE, ZESTORETIC) 20-25 mg per tablet TAKE ONE TABLET BY MOUTH DAILY    calcium citrate/vitamin D3 (CITRACAL + D PO) Take  by mouth.  zinc 50 mg tab tablet Take  by mouth daily.  atorvastatin (LIPITOR) 10 mg tablet take 1 tablet by mouth once daily    diclofenac EC (VOLTAREN) 75 mg EC tablet Take 1 Tab by mouth two (2) times a day. Indications: prn    diphenhydrAMINE (BENADRYL) 25 mg capsule Take 25 mg by mouth every four (4) hours as needed for Allergies.  MAGNESIUM CITRATE PO Take 1,000 Caps by mouth daily.  EPINEPHrine (EPIPEN 2-ASHLEY) 0.3 mg/0.3 mL (1:1,000) injection 0.3 mL by IntraMUSCular route once as needed for up to 1 dose.  acetaminophen (TYLENOL) 325 mg tablet Take 325 mg by mouth as needed for Pain. Indications: PAIN, head    aspirin 81 mg chewable tablet Take 81 mg by mouth daily. Indications: heart palpitations    Prior to Admission medications    Medication Sig Start Date End Date Taking? Authorizing Provider   lisinopril-hydroCHLOROthiazide (PRINZIDE, ZESTORETIC) 20-25 mg per tablet TAKE ONE TABLET BY MOUTH DAILY 11/16/20   TraverseBernardinoMamta ÁLVARO PA-C   calcium citrate/vitamin D3 (CITRACAL + D PO) Take  by mouth. Provider, Historical   zinc 50 mg tab tablet Take  by mouth daily. Provider, Historical   atorvastatin (LIPITOR) 10 mg tablet take 1 tablet by mouth once daily 8/16/19   TraverseLissette charlese ÁLVARO PA-C   diclofenac EC (VOLTAREN) 75 mg EC tablet Take 1 Tab by mouth two (2) times a day. Indications: prn 8/16/19   Kylah Mamta ÁLVARO PA-C   diphenhydrAMINE (BENADRYL) 25 mg capsule Take 25 mg by mouth every four (4) hours as needed for Allergies. Provider, Historical   MAGNESIUM CITRATE PO Take 1,000 Caps by mouth daily. Provider, Historical   EPINEPHrine (EPIPEN 2-ASHLEY) 0.3 mg/0.3 mL (1:1,000) injection 0.3 mL by IntraMUSCular route once as needed for up to 1 dose. 3/1/16   Kylah Mamta ÁLVARO PA-C   acetaminophen (TYLENOL) 325 mg tablet Take 325 mg by mouth as needed for Pain. Indications: PAIN, head    Provider, Historical   aspirin 81 mg chewable tablet Take 81 mg by mouth daily. Indications: heart palpitations    Provider, Historical        PMHx:  has a past medical history of Arrhythmia, Arthritis, High cholesterol, Hypertension, Ill-defined condition (2015), and Musculoskeletal disorder. PSurgHx:  has a past surgical history that includes hx appendectomy; hx tonsillectomy; hx abdominal laparoscopy; hx tubal ligation (1989); pr ins new/rplcmt prm pm w/transv eltrd atrial&vent (N/A, 7/26/2019); and colonoscopy (N/A, 8/4/2020). PSocHx:  reports that she has been smoking cigarettes. She has a 21.00 pack-year smoking history. She has never used smokeless tobacco. She reports previous alcohol use. She reports that she does not use drugs.    ROS:  (Complete - 10 systems) - DENIES: Weightloss (Constitutional), Dry mouth (ENMT), Chest pain (CV), SOB (Respiratory), Constipation (GI), Weakness (MS), Pallor (Skin), TIA Sx (Neuro), Confusion (Psych), Easy bruising (Heme)    Physical Exam: (Comprehesive - 8+ 1995 Systems)     (1) Constitutional:  FIO2:   on SpO2: O2 Sat (%): 94 %       Patient Vitals for the past 24 hrs:   BP Temp Pulse Resp SpO2   02/16/21 1030 (!) 186/106 98.6 °F (37 °C) 73 17 94 %   02/16/21 0914 (!) 206/119 (!) 96.7 °F (35.9 °C) 84 22 98 %          (2) ENMT:   moist mucous membranes, normal sinuses   (3) Respiratory:  breathing easily, no distress   (4) GI:  no abdominal masses, tenderness   (5) :   Opal UA   (6) Lymphatic:  no adenopathy, neck supple   (7) Muscloskeletal:  no gross deformity, normal ROM   (8) Skin:  no rash, warm & dry   (9) Neuro:  no focal deficits, normal speech      Signed By: Lulu Coulter NP  - February 16, 2021

## 2021-02-16 NOTE — ED PROVIDER NOTES
76 y/o female with PMHx of HTN, HLD and sick sinus syndrome s/p pacemaker placement, presenting with complaint of flank pain. The patient reports a sudden onset of left flank pain 30 minutes PTA. She notes a similar episode yesterday at 3 AM which was less severe. Her pain is currently 8/10, radiating to her left abdomen, not relieved by Azo. She had a recent UTI and finished taking Keflex 5 days ago. She reports nausea, vomiting, and malodorous urine. Denies fevers, hematuria, urgency/frequency, body aches or syncope. The history is provided by the patient.         Past Medical History:   Diagnosis Date    Arrhythmia     sick sinus syndrome-has demand pacemaker    Arthritis     osteoarthritis-left foot and knee    High cholesterol     Hypertension     Ill-defined condition     frontal lobe bleed    Musculoskeletal disorder        Past Surgical History:   Procedure Laterality Date    COLONOSCOPY N/A 2020    COLONOSCOPY performed by Shireen Apgar, MD at [facility] HX ABDOMINAL LAPAROSCOPY      HX APPENDECTOMY      HX TONSILLECTOMY      HX TUBAL LIGATION      VA INS NEW/RPLCMT PRM PM W/TRANSV ELTRD ATRIAL&VENT N/A 2019    INSERT PPM DUAL performed by Hank Orellana MD at [address] CATH LAB         Family History:   Problem Relation Age of Onset    Heart Disease Mother     Hypertension Mother     Diabetes Mother     Heart Disease Father     Diabetes Father     Hypertension Father        Social History     Socioeconomic History    Marital status:      Spouse name: Not on file    Number of children: Not on file    Years of education: Not on file    Highest education level: Not on file   Occupational History    Not on file   Social Needs    Financial resource strain: Not on file    Food insecurity     Worry: Not on file     Inability: Not on file    Transportation needs     Medical: Not on file     Non-medical: Not on file   Tobacco Use    Smoking status: Current Every Day Smoker     Packs/day: 0.50     Years: 42.00     Pack years: 21.00     Types: Cigarettes    Smokeless tobacco: Never Used   Substance and Sexual Activity    Alcohol use: Not Currently     Frequency: Monthly or less     Drinks per session: 1 or 2     Binge frequency: Never    Drug use: No    Sexual activity: Never   Lifestyle    Physical activity     Days per week: Not on file     Minutes per session: Not on file    Stress: Not on file   Relationships    Social connections     Talks on phone: Not on file     Gets together: Not on file     Attends Sabianist service: Not on file     Active member of club or organization: Not on file     Attends meetings of clubs or organizations: Not on file     Relationship status: Not on file    Intimate partner violence     Fear of current or ex partner: Not on file     Emotionally abused: Not on file     Physically abused: Not on file     Forced sexual activity: Not on file   Other Topics Concern    Not on file   Social History Narrative    Not on file         ALLERGIES: Gluten and Chloromycetin [chloramphenicol sod succinate]    Review of Systems   Constitutional: Negative for chills and fever. HENT: Negative for congestion. Respiratory: Negative for cough. Gastrointestinal: Positive for nausea and vomiting. Negative for diarrhea. Genitourinary: Positive for flank pain (left-sided). Negative for dysuria, frequency, hematuria and urgency. + malodorous urine   Musculoskeletal: Negative for arthralgias and myalgias. Neurological: Negative for syncope. All other systems reviewed and are negative. Vitals:    02/16/21 0914   Pulse: 84   Resp: 22   Temp: (!) 96.7 °F (35.9 °C)   SpO2: 98%            Physical Exam  Vitals signs and nursing note reviewed. Constitutional:       General: She is not in acute distress. Appearance: She is well-developed. She is not diaphoretic. HENT:      Head: Normocephalic and atraumatic.    Eyes: Conjunctiva/sclera: Conjunctivae normal.   Neck:      Musculoskeletal: Normal range of motion and neck supple. Cardiovascular:      Rate and Rhythm: Normal rate and regular rhythm. Heart sounds: Normal heart sounds. Pulmonary:      Effort: Pulmonary effort is normal.      Breath sounds: Normal breath sounds. Abdominal:      General: Bowel sounds are normal. There is no distension. Palpations: Abdomen is soft. Tenderness: There is abdominal tenderness (LUQ, some LLQ). There is no guarding or rebound. Skin:     General: Skin is warm and dry. Neurological:      Mental Status: She is alert and oriented to person, place, and time. MDM  Number of Diagnoses or Management Options     Amount and/or Complexity of Data Reviewed  Clinical lab tests: ordered and reviewed  Tests in the radiology section of CPT®: ordered and reviewed    Patient Progress  Patient progress: stable         Procedures        78 y/o female with PMHx of HTN, HLD and sick sinus syndrome s/p pacemaker placement, presenting with complaint of flank pain. Patient is well-appearing in no acute distress, does not appear toxic or septic. CT abd/pelvis w/o contrast reveals left sided punctate stone with mild hydronephrosis. UA consistent with UTI, will consult urology for recommendations. Consult Note  - 12:35 PM  Pauly Lane NP has seen the patient at bedside, working in conjunction with Dr. Dede Dow. Plan is for discharge with Rx for Bactrim, Flomax, Zofran and Norco, with prompt outpatient urology follow-up. Plan of care discussed with patient, prescriptions provided. Strict ED return precautions discussed and provided in writing at time of discharge. The patient verbalized understanding and agreement with this plan.

## 2021-02-18 LAB
BACTERIA SPEC CULT: ABNORMAL
CC UR VC: ABNORMAL
SERVICE CMNT-IMP: ABNORMAL

## 2021-05-24 ENCOUNTER — OFFICE VISIT (OUTPATIENT)
Dept: CARDIOLOGY CLINIC | Age: 66
End: 2021-05-24
Payer: MEDICARE

## 2021-05-24 DIAGNOSIS — Z95.0 CARDIAC PACEMAKER IN SITU: Primary | ICD-10-CM

## 2021-05-24 PROCEDURE — 93296 REM INTERROG EVL PM/IDS: CPT | Performed by: INTERNAL MEDICINE

## 2021-05-24 PROCEDURE — 93294 REM INTERROG EVL PM/LDLS PM: CPT | Performed by: INTERNAL MEDICINE

## 2021-05-24 NOTE — LETTER
2021 11:17 AM 
 
Ms. Flavia [address] [phone] Dear Patient, This letter is to inform you that as of  [facility] will no longer be sending out confirmation letters for remote transmissions through the [employer]. All letters in the future will be posted in an electronic medical records format therefore we highly encourage enrollment in [employer] patient's portal. Once enrolled you will have access to any letters we send as well as appointment information that can be found in your medical record. Our EP team would contact you via phone if there are significant abnormal findings so you can discuss with Dr. Fiona Carrero further in office. Missed transmission letters will also be digitized in [employer] but we will continue to send those out through the mail as well. How to register for [employer]: - Visit [email or web address] - Click Create an Account - Provide your name, address, and  
- You will then be asked a short series of questions to verify your identity. This helps to ensure that no one else can access your information.  
- If you have any further questions, please contact our office at [phone]. If you choose not to enroll in [employer] or do not have internet access, please kindly let device clinic know and we will accommodate your preference. We are grateful for your understanding and looking forward to this new, improved and more efficient way of communication. Warm Regards, [facility] Staff

## 2021-05-24 NOTE — LETTER
5/24/2021 11:17 AM 
 
Ms. Ghosh [address] [phone] After careful review of your remote check of your implated device on 4-25-06. I have concluded that your device is working properly. Your next: In clinic device check is scheduled for   8-3-21 at  2:00pm. 
 
 
 
 
If you have any questions, please call [facility] at [phone]. Additional Comments: ________________________________________________ 
 
__________________________________________________________________ 
 
__________________________________________________________________ Monika Maloney MD [facility] 
 
 52 y/o F with PMH HTN, hypothyroid p/w elevated blood pressure. Pt states he was seen by cardiology and had a negative stress test. She was on amlodipine 5mg and then had losartan added 50mg . She saw her endocrinologist and had the amlodipine and losartan stopped and was started on verapamil 120mg ED. She states she has had persistently elevated blood pressures 180;s since the switch w/ occasioanal headaches. she denies fever, chills, chest pain, SOb, numbness, weakness, n/v.   denies fever, chills, chest pain, SOB, abdominal pain, diarrhea, dysuria, syncope, bleeding, new rash,weakness, numbness, blurred vision    ROS  otherwise negative as per HPI  Gen: Awake, Alert, WD, WN, NAD  Head:  NC/AT  Eyes:  PERRL, EOMI, Conjunctiva pink, lids normal, no scleral icterus  Neck: supple, nontender, no meningismus, no JVD, trachea midline  Cardiac/CV:  S1 S2, RRR, no M/G/R  Respiratory/Pulm:  CTAB, good air movement, normal resp effort, no wheezes/stridor/retractions/rales/rhonchi  Gastrointestinal/Abdomen:  Soft, nontender, nondistended, +BS, no rebound/guarding  Back:  no CVAT, no MLT  Ext:  warm, well perfused, moving all extremities spontaneously, no peripheral edema, distal pulses intact  Skin: intact, no rash  psych: calm , mildly tearful   Neuro:  AAOx3, sensation intact, motor 5/5 x 4 extremities, normal gait, speech clear  MDM as above

## 2021-06-21 ENCOUNTER — OFFICE VISIT (OUTPATIENT)
Dept: FAMILY MEDICINE CLINIC | Age: 66
End: 2021-06-21
Payer: MEDICARE

## 2021-06-21 VITALS
BODY MASS INDEX: 38 KG/M2 | WEIGHT: 222.6 LBS | HEART RATE: 64 BPM | SYSTOLIC BLOOD PRESSURE: 137 MMHG | OXYGEN SATURATION: 97 % | HEIGHT: 64 IN | DIASTOLIC BLOOD PRESSURE: 73 MMHG | TEMPERATURE: 98.3 F | RESPIRATION RATE: 16 BRPM

## 2021-06-21 DIAGNOSIS — M54.42 CHRONIC LEFT-SIDED LOW BACK PAIN WITH BILATERAL SCIATICA: ICD-10-CM

## 2021-06-21 DIAGNOSIS — I49.5 SICK SINUS SYNDROME (HCC): ICD-10-CM

## 2021-06-21 DIAGNOSIS — F17.200 SMOKER: ICD-10-CM

## 2021-06-21 DIAGNOSIS — G89.29 CHRONIC LEFT-SIDED LOW BACK PAIN WITH BILATERAL SCIATICA: ICD-10-CM

## 2021-06-21 DIAGNOSIS — Z12.31 BREAST CANCER SCREENING BY MAMMOGRAM: ICD-10-CM

## 2021-06-21 DIAGNOSIS — I10 ESSENTIAL HYPERTENSION: ICD-10-CM

## 2021-06-21 DIAGNOSIS — Z13.31 SCREENING FOR DEPRESSION: ICD-10-CM

## 2021-06-21 DIAGNOSIS — Z87.81 HX OF COMPRESSION FRACTURE OF SPINE: ICD-10-CM

## 2021-06-21 DIAGNOSIS — Z00.00 MEDICARE ANNUAL WELLNESS VISIT, SUBSEQUENT: Primary | ICD-10-CM

## 2021-06-21 DIAGNOSIS — Z13.1 SCREENING FOR DIABETES MELLITUS: ICD-10-CM

## 2021-06-21 DIAGNOSIS — M54.41 CHRONIC LEFT-SIDED LOW BACK PAIN WITH BILATERAL SCIATICA: ICD-10-CM

## 2021-06-21 DIAGNOSIS — E66.01 OBESITY, MORBID (HCC): ICD-10-CM

## 2021-06-21 DIAGNOSIS — N20.0 KIDNEY STONE: ICD-10-CM

## 2021-06-21 DIAGNOSIS — Z13.39 SCREENING FOR ALCOHOLISM: ICD-10-CM

## 2021-06-21 DIAGNOSIS — G47.33 OSA (OBSTRUCTIVE SLEEP APNEA): ICD-10-CM

## 2021-06-21 DIAGNOSIS — R73.01 IMPAIRED FASTING GLUCOSE: ICD-10-CM

## 2021-06-21 DIAGNOSIS — Z95.0 PACEMAKER: ICD-10-CM

## 2021-06-21 DIAGNOSIS — E78.5 HYPERLIPIDEMIA, UNSPECIFIED HYPERLIPIDEMIA TYPE: ICD-10-CM

## 2021-06-21 PROCEDURE — G8510 SCR DEP NEG, NO PLAN REQD: HCPCS | Performed by: FAMILY MEDICINE

## 2021-06-21 PROCEDURE — 1090F PRES/ABSN URINE INCON ASSESS: CPT | Performed by: FAMILY MEDICINE

## 2021-06-21 PROCEDURE — 3017F COLORECTAL CA SCREEN DOC REV: CPT | Performed by: FAMILY MEDICINE

## 2021-06-21 PROCEDURE — G8536 NO DOC ELDER MAL SCRN: HCPCS | Performed by: FAMILY MEDICINE

## 2021-06-21 PROCEDURE — G8417 CALC BMI ABV UP PARAM F/U: HCPCS | Performed by: FAMILY MEDICINE

## 2021-06-21 PROCEDURE — 99213 OFFICE O/P EST LOW 20 MIN: CPT | Performed by: FAMILY MEDICINE

## 2021-06-21 PROCEDURE — G8427 DOCREV CUR MEDS BY ELIG CLIN: HCPCS | Performed by: FAMILY MEDICINE

## 2021-06-21 PROCEDURE — G8399 PT W/DXA RESULTS DOCUMENT: HCPCS | Performed by: FAMILY MEDICINE

## 2021-06-21 PROCEDURE — G8752 SYS BP LESS 140: HCPCS | Performed by: FAMILY MEDICINE

## 2021-06-21 PROCEDURE — 1101F PT FALLS ASSESS-DOCD LE1/YR: CPT | Performed by: FAMILY MEDICINE

## 2021-06-21 PROCEDURE — G8754 DIAS BP LESS 90: HCPCS | Performed by: FAMILY MEDICINE

## 2021-06-21 PROCEDURE — G0439 PPPS, SUBSEQ VISIT: HCPCS | Performed by: FAMILY MEDICINE

## 2021-06-21 PROCEDURE — G9899 SCRN MAM PERF RSLTS DOC: HCPCS | Performed by: FAMILY MEDICINE

## 2021-06-21 RX ORDER — BUPROPION HYDROCHLORIDE 150 MG/1
TABLET, EXTENDED RELEASE ORAL
Qty: 60 TABLET | Refills: 2 | Status: SHIPPED | OUTPATIENT
Start: 2021-06-21

## 2021-06-21 RX ORDER — MELATONIN 5 MG
5 CAPSULE ORAL
COMMUNITY

## 2021-06-21 RX ORDER — ATORVASTATIN CALCIUM 10 MG/1
TABLET, FILM COATED ORAL
Qty: 90 TABLET | Refills: 1 | Status: SHIPPED | OUTPATIENT
Start: 2021-06-21 | End: 2021-07-29 | Stop reason: ALTCHOICE

## 2021-06-21 RX ORDER — LISINOPRIL AND HYDROCHLOROTHIAZIDE 20; 25 MG/1; MG/1
1 TABLET ORAL DAILY
Qty: 90 TABLET | Refills: 1 | Status: SHIPPED | OUTPATIENT
Start: 2021-06-21 | End: 2022-01-27 | Stop reason: SDUPTHER

## 2021-06-21 NOTE — PROGRESS NOTES
Chief Complaint   Patient presents with    Hypertension    Other     referral to [facility]     Medication Refill     1. Have you been to the ER, urgent care clinic since your last visit? Hospitalized since your last visit? Yes When: Kidney Stone in [facility]     2. Have you seen or consulted any other health care providers outside of the [address] [place] [address] since your last visit? Include any pap smears or colon screening. No       Per patient she no longer takes medication for cholesterol and has not taken BP medication she has been out for about a month.

## 2021-06-21 NOTE — PATIENT INSTRUCTIONS

## 2021-06-21 NOTE — PROGRESS NOTES
This is the Subsequent Medicare Annual Wellness Exam, performed 12 months or more after the Initial AWV or the last Subsequent AWV    I have reviewed the patient's medical history in detail and updated the computerized patient record. Assessment/Plan   Education and counseling provided:  Are appropriate based on today's review and evaluation      ICD-10-CM ICD-9-CM    1. Medicare annual wellness visit, subsequent  Z00.00 V70.0    2. Breast cancer screening by mammogram  Z12.31 V76.12 MARY MAMMO BI SCREENING INCL CAD   3. Screening for alcoholism  Z13.39 V79.1 VA ANNUAL ALCOHOL SCREEN 15 MIN   4. Screening for depression  Z13.31 V79.0 DEPRESSION SCREEN ANNUAL     Medicare Wellness Exam: Reviewed and addressed patient's medical history and concerns as discussed in note and above in orders. Reviewed recommended screenings and immunizations. Discussed recommendations for diet, exercise, and lifestyle. Depression Risk Factor Screening     3 most recent PHQ Screens 6/21/2021   Little interest or pleasure in doing things Not at all   Feeling down, depressed, irritable, or hopeless Not at all   Total Score PHQ 2 0       Alcohol Risk Screen    Do you average more than 1 drink per night or more than 7 drinks a week:  No    On any one occasion in the past three months have you have had more than 3 drinks containing alcohol:  No        Functional Ability and Level of Safety    Hearing: Hearing is good. Activities of Daily Living: The home contains: handrails  Patient does total self care      Ambulation: with no difficulty     Fall Risk:  Fall Risk Assessment, last 12 mths 6/21/2021   Able to walk? Yes   Fall in past 12 months? 0   Do you feel unsteady?  0   Are you worried about falling 0      Abuse Screen:  Patient is not abused       Cognitive Screening    Has your family/caregiver stated any concerns about your memory: no     Cognitive Screening: normal          Health Maintenance Due     Health Maintenance Due   Topic Date Due    DTaP/Tdap/Td series (1 - Tdap) Never done    Shingrix Vaccine Age 50> (1 of 2) Never done    Breast Cancer Screen Mammogram  06/08/2020    Pneumococcal 65+ years (2 of 2 - PPSV23) 03/04/2021       Patient Care Team   Patient Care Team:  Marissa Bates MD as PCP - General (Family Medicine)  Marissa Bates MD as PCP [facility]  Linda Garcia MD (Cardiology)  Jaya Mancia MD (Cardiology)  Benedict Horan MD as Physician (Gastroenterology)    History     Patient Active Problem List   Diagnosis Code    Hypertension I10    Obesity, morbid ([address] E66.01    Subcutaneous masses, trunk R22.9    Sick sinus syndrome ([facility] I49.5    Pacemaker Z95.0    Family history of melanoma Z80.8    Tobacco use Z72.0    Hx of anaphylaxis Z87.892    Hemorrhoids K64.9    AVTAR (obstructive sleep apnea) G47.33     Past Medical History:   Diagnosis Date    Arrhythmia     sick sinus syndrome-has demand pacemaker    Arthritis     osteoarthritis-left foot and knee    High cholesterol     Hypertension     Ill-defined condition 2015    frontal lobe bleed    Musculoskeletal disorder       Past Surgical History:   Procedure Laterality Date    COLONOSCOPY N/A 8/4/2020    COLONOSCOPY performed by Benedict Horan MD at [facility][note 3] HX ABDOMINAL LAPAROSCOPY      HX APPENDECTOMY      HX TONSILLECTOMY      HX TUBAL LIGATION  1989    TX INS NEW/RPLCMT PRM PM W/TRANSV ELTRD ATRIAL&VENT N/A 7/26/2019    INSERT PPM DUAL performed by Jaya Mancia MD at [address], [facility] CATH LAB     Current Outpatient Medications   Medication Sig Dispense Refill    ubidecarenone (COQ-10 PO) Take 1 Capsule by mouth daily.  melatonin 5 mg cap capsule Take 5 mg by mouth nightly.  OTHER Take 150 mg by mouth daily.  NMN peptide    One scoop = 150 mg      OTHER resveratrol      atorvastatin (LIPITOR) 10 mg tablet take 1 tablet by mouth once daily 90 Tablet 1    lisinopril-hydroCHLOROthiazide (PRINZIDE, ZESTORETIC) 20-25 mg per tablet Take 1 Tablet by mouth daily. 90 Tablet 1    buPROPion SR (WELLBUTRIN SR) 150 mg SR tablet Take one tablet daily for 3 days, then increase to one tablet twice a day. Separate doses by at least 8 hours, last dose no later than 6 pm; stop smoking after 5-7 days 60 Tablet 2    zinc 50 mg tab tablet Take  by mouth daily.  diphenhydrAMINE (BENADRYL) 25 mg capsule Take 25 mg by mouth every four (4) hours as needed for Allergies.  MAGNESIUM CITRATE PO Take 1,000 Capsules by mouth daily.  EPINEPHrine (EPIPEN 2-ASHLEY) 0.3 mg/0.3 mL (1:1,000) injection 0.3 mL by IntraMUSCular route once as needed for up to 1 dose. 1 Syringe 2    acetaminophen (TYLENOL) 325 mg tablet Take 325 mg by mouth as needed for Pain. Indications: PAIN, head      aspirin 81 mg chewable tablet Take 81 mg by mouth daily.  Indications: heart palpitations       Allergies   Allergen Reactions    Gluten Other (comments)    Chloromycetin [Chloramphenicol Sod Succinate] Other (comments)     Anemia         Family History   Problem Relation Age of Onset    Heart Disease Mother     Hypertension Mother     Diabetes Mother     Heart Disease Father     Diabetes Father     Hypertension Father      Social History     Tobacco Use    Smoking status: Current Every Day Smoker     Packs/day: 0.50     Years: 42.00     Pack years: 21.00     Types: Cigarettes    Smokeless tobacco: Never Used   Substance Use Topics    Alcohol use: Not Currently         Stephanie Talamantes MD

## 2021-06-22 LAB
ALBUMIN SERPL-MCNC: 4.4 G/DL (ref 3.8–4.8)
ALBUMIN/GLOB SERPL: 1.8 {RATIO} (ref 1.2–2.2)
ALP SERPL-CCNC: 94 IU/L (ref 48–121)
ALT SERPL-CCNC: 16 IU/L (ref 0–32)
APPEARANCE UR: ABNORMAL
AST SERPL-CCNC: 17 IU/L (ref 0–40)
BILIRUB SERPL-MCNC: 0.7 MG/DL (ref 0–1.2)
BILIRUB UR QL STRIP: NEGATIVE
BUN SERPL-MCNC: 14 MG/DL (ref 8–27)
BUN/CREAT SERPL: 19 (ref 12–28)
CALCIUM SERPL-MCNC: 10.2 MG/DL (ref 8.7–10.3)
CHLORIDE SERPL-SCNC: 107 MMOL/L (ref 96–106)
CHOLEST SERPL-MCNC: 238 MG/DL (ref 100–199)
CO2 SERPL-SCNC: 20 MMOL/L (ref 20–29)
COLOR UR: YELLOW
CREAT SERPL-MCNC: 0.74 MG/DL (ref 0.57–1)
ERYTHROCYTE [DISTWIDTH] IN BLOOD BY AUTOMATED COUNT: 13.3 % (ref 11.7–15.4)
EST. AVERAGE GLUCOSE BLD GHB EST-MCNC: 105 MG/DL
GLOBULIN SER CALC-MCNC: 2.5 G/DL (ref 1.5–4.5)
GLUCOSE SERPL-MCNC: 97 MG/DL (ref 65–99)
GLUCOSE UR QL: NEGATIVE
HBA1C MFR BLD: 5.3 % (ref 4.8–5.6)
HCT VFR BLD AUTO: 44.3 % (ref 34–46.6)
HDLC SERPL-MCNC: 34 MG/DL
HGB BLD-MCNC: 15.8 G/DL (ref 11.1–15.9)
HGB UR QL STRIP: NEGATIVE
IMP & REVIEW OF LAB RESULTS: NORMAL
KETONES UR QL STRIP: ABNORMAL
LDLC SERPL CALC-MCNC: 173 MG/DL (ref 0–99)
LEUKOCYTE ESTERASE UR QL STRIP: NEGATIVE
MCH RBC QN AUTO: 31 PG (ref 26.6–33)
MCHC RBC AUTO-ENTMCNC: 35.7 G/DL (ref 31.5–35.7)
MCV RBC AUTO: 87 FL (ref 79–97)
MICRO URNS: ABNORMAL
NITRITE UR QL STRIP: NEGATIVE
PH UR STRIP: 5 [PH] (ref 5–7.5)
PLATELET # BLD AUTO: 281 X10E3/UL (ref 150–450)
POTASSIUM SERPL-SCNC: 4 MMOL/L (ref 3.5–5.2)
PROT SERPL-MCNC: 6.9 G/DL (ref 6–8.5)
PROT UR QL STRIP: ABNORMAL
RBC # BLD AUTO: 5.09 X10E6/UL (ref 3.77–5.28)
SODIUM SERPL-SCNC: 142 MMOL/L (ref 134–144)
SP GR UR: >=1.03 (ref 1–1.03)
TRIGL SERPL-MCNC: 167 MG/DL (ref 0–149)
UROBILINOGEN UR STRIP-MCNC: 1 MG/DL (ref 0.2–1)
VLDLC SERPL CALC-MCNC: 31 MG/DL (ref 5–40)
WBC # BLD AUTO: 9.8 X10E3/UL (ref 3.4–10.8)

## 2021-06-27 PROBLEM — R73.01 IMPAIRED FASTING GLUCOSE: Status: ACTIVE | Noted: 2021-06-27

## 2021-06-27 PROBLEM — Z87.81 HX OF COMPRESSION FRACTURE OF SPINE: Status: ACTIVE | Noted: 2021-06-27

## 2021-06-27 PROBLEM — M85.89 OSTEOPENIA OF MULTIPLE SITES: Status: ACTIVE | Noted: 2021-06-27

## 2021-06-28 NOTE — PROGRESS NOTES
Selvin Yang  77 y.o. female  1955  [address] [place] [id number]  [id number]     [facility] PR[facility]       Encounter Date: 6/21/2021           Established Patient Visit Note: Kaylene Bejarano MD    Reason for Appointment:  Chief Complaint   Patient presents with    Hypertension    Other     referral to [address] [place] [address] urology     Medication Refill       History of Present Illness:  History provided by patient    Selvin Yang is a 77 y.o. female who presents to clinic today for:      · Obesity: she is working to eat healthier and exercising  · Kidney Stones: reports that she passed one; she has not yet seen urology  · Chronic Low Back Pain and Compression Fracture of L4: identified on CT scan of 2/16/21. She endorses continuing to have a lot of lower back pain. Last DEXA March, 2019 with osteopenia (MOF 8.4%, HF 0.9%)  · HTN: using wrist monitor at home but readings have been sporadic and she does not trust it. · AVTAR: saw sleep medicine, but  Has not follow up since  · SSS: next visit with cardiology in August, reports as stable, no problems with pacemaker   · Nicotine Dependence: has started back smoking. Smokes 10-15 cigarettes per day, but plans to cut back and quit soon. She is not bringing any medicaitons into the house d/t her daughter \"grinding up ans snorting any pills that are around\". She would like to restart Wellbutrin.    · Dyslipidemia: has ran out of her cholesterol medication one month ago    Lab Results   Component Value Date/Time    Hemoglobin A1c 5.4 10/06/2020 09:33 AM     Lab Results   Component Value Date/Time    Cholesterol, total 199 10/06/2020 09:33 AM    HDL Cholesterol 36 (L) 10/06/2020 09:33 AM    LDL, calculated 132 (H) 10/06/2020 09:33 AM    LDL, calculated 111 (H) 03/01/2019 08:53 AM    VLDL, calculated 31 10/06/2020 09:33 AM    VLDL, calculated 32 03/01/2019 08:53 AM    Triglyceride 171 (H) 10/06/2020 09:33 AM           Review of Systems  Review of Systems   Constitutional: Negative for chills and fever. Respiratory: Negative for cough, shortness of breath and wheezing. Cardiovascular: Negative for chest pain and palpitations. Allergies: Gluten and Chloromycetin [chloramphenicol sod succinate]    Medications: (Updated to reflect final medication list after visit)    Current Outpatient Medications:     ubidecarenone (COQ-10 PO), Take 1 Capsule by mouth daily. , Disp: , Rfl:     melatonin 5 mg cap capsule, Take 5 mg by mouth nightly., Disp: , Rfl:     OTHER, Take 150 mg by mouth daily. NMN peptide  One scoop = 150 mg, Disp: , Rfl:     OTHER, resveratrol, Disp: , Rfl:     atorvastatin (LIPITOR) 10 mg tablet, take 1 tablet by mouth once daily, Disp: 90 Tablet, Rfl: 1    lisinopril-hydroCHLOROthiazide (PRINZIDE, ZESTORETIC) 20-25 mg per tablet, Take 1 Tablet by mouth daily. , Disp: 90 Tablet, Rfl: 1    buPROPion SR (WELLBUTRIN SR) 150 mg SR tablet, Take one tablet daily for 3 days, then increase to one tablet twice a day. Separate doses by at least 8 hours, last dose no later than 6 pm; stop smoking after 5-7 days, Disp: 60 Tablet, Rfl: 2    zinc 50 mg tab tablet, Take  by mouth daily. , Disp: , Rfl:     diphenhydrAMINE (BENADRYL) 25 mg capsule, Take 25 mg by mouth every four (4) hours as needed for Allergies. , Disp: , Rfl:     MAGNESIUM CITRATE PO, Take 1,000 Capsules by mouth daily. , Disp: , Rfl:     EPINEPHrine (EPIPEN 2-ASHLEY) 0.3 mg/0.3 mL (1:1,000) injection, 0.3 mL by IntraMUSCular route once as needed for up to 1 dose., Disp: 1 Syringe, Rfl: 2    acetaminophen (TYLENOL) 325 mg tablet, Take 325 mg by mouth as needed for Pain. Indications: PAIN, head, Disp: , Rfl:     aspirin 81 mg chewable tablet, Take 81 mg by mouth daily.  Indications: heart palpitations, Disp: , Rfl:     History  Patient Care Team:  Jose Nguyen MD as PCP - General (Family Medicine)  Jose Nguyen MD as PCP - [facility] Empaneled Provider  Dwight Whiting MD (Cardiology)  Lupe Harris MD (Cardiology)  Tarma Shields MD as Physician (Gastroenterology)    Past Medical History: she has a past medical history of Arrhythmia, Arthritis, High cholesterol, Hypertension, Ill-defined condition (2015), and Musculoskeletal disorder. Past Surgical History: she has a past surgical history that includes hx appendectomy; hx tonsillectomy; hx abdominal laparoscopy; hx tubal ligation (1989); pr ins new/rplcmt prm pm w/transv eltrd atrial&vent (N/A, 7/26/2019); and colonoscopy (N/A, 8/4/2020). Family Medical History: family history includes Diabetes in her father and mother; Heart Disease in her father and mother; Hypertension in her father and mother. Social History: she reports that she has been smoking cigarettes. She has a 21.00 pack-year smoking history. She has never used smokeless tobacco. She reports previous alcohol use. She reports that she does not use drugs. Health Maintenance Due   Topic Date Due    DTaP/Tdap/Td series (1 - Tdap) Never done    Shingrix Vaccine Age 50> (1 of 2) Never done    Breast Cancer Screen Mammogram  06/08/2020    Pneumococcal 65+ years (2 of 2 - PPSV23) 03/04/2021       Objective:   Visit Vitals  /73 (BP 1 Location: Left upper arm, BP Patient Position: Sitting)   Pulse 64   Temp 98.3 °F (36.8 °C) (Temporal)   Resp 16   Ht 5' 4\" (1.626 m)   Wt 222 lb 9.6 oz (101 kg)   SpO2 97%   BMI 38.21 kg/m²     Wt Readings from Last 3 Encounters:   06/21/21 222 lb 9.6 oz (101 kg)   08/04/20 238 lb 15.7 oz (108.4 kg)   03/04/20 235 lb (106.6 kg)       Physical Exam  Vitals and nursing note reviewed. Constitutional:       General: She is not in acute distress. Appearance: Normal appearance. HENT:      Head: Normocephalic and atraumatic. Nose: Nose normal.      Mouth/Throat:      Mouth: Mucous membranes are moist.   Eyes:      Extraocular Movements: Extraocular movements intact.       Conjunctiva/sclera: Conjunctivae normal. Pupils: Pupils are equal, round, and reactive to light. Cardiovascular:      Rate and Rhythm: Normal rate and regular rhythm. Pulses: Normal pulses. Heart sounds: Normal heart sounds. No murmur heard. No friction rub. No gallop. Pulmonary:      Effort: Pulmonary effort is normal. No respiratory distress. Breath sounds: Normal breath sounds. No wheezing, rhonchi or rales. Musculoskeletal:         General: Normal range of motion. Cervical back: Normal range of motion and neck supple. No rigidity. No muscular tenderness. Lymphadenopathy:      Cervical: No cervical adenopathy. Skin:     General: Skin is warm. Coloration: Skin is not jaundiced. Neurological:      General: No focal deficit present. Mental Status: She is alert. Mental status is at baseline. Cranial Nerves: Cranial nerves are intact. Motor: Motor function is intact. Coordination: Coordination is intact. Psychiatric:         Mood and Affect: Mood normal.         Behavior: Behavior normal.         Thought Content: Thought content normal.         Judgment: Judgment normal.         Assessment & Plan:      ICD-10-CM ICD-9-CM    5. Obesity, morbid ([facility]  E66.01 278.01    6. Kidney stone  N20.0 592.0 REFERRAL TO UROLOGY      URINALYSIS W/ RFLX MICROSCOPIC      URINALYSIS W/ RFLX MICROSCOPIC   7. Chronic left-sided low back pain with bilateral sciatica  M54.41 724.2 REFERRAL TO PAIN MANAGEMENT    M54.42 724.3     G89.29 338.29    8. Hx of compression fracture of spine  Z87.81 V15.51    9. Essential hypertension  I10 401.9 CBC W/O DIFF      LIPID PANEL      METABOLIC PANEL, COMPREHENSIVE      CBC W/O DIFF      LIPID PANEL      METABOLIC PANEL, COMPREHENSIVE   10. AVTAR (obstructive sleep apnea)  G47.33 327.23    11.  Sick sinus syndrome (HCC)  I49.5 427.81 CBC W/O DIFF      LIPID PANEL      METABOLIC PANEL, COMPREHENSIVE      CBC W/O DIFF      LIPID PANEL      METABOLIC PANEL, COMPREHENSIVE   12. Pacemaker Z95.0 V45.01       HEMOGLOBIN A1C WITH EAG   14. Smoker  F17.200 305.1 buPROPion SR (WELLBUTRIN SR) 150 mg SR tablet   15. Hyperlipidemia, unspecified hyperlipidemia type  E78.5 272.4 atorvastatin (LIPITOR) 10 mg tablet   16. Impaired fasting glucose  R73.01 790.21 HEMOGLOBIN A1C WITH EAG      HEMOGLOBIN A1C WITH EAG     · Obesity: Chronic, uncontrolled. I have reviewed/discussed the above normal BMI with the patient. I have recommended the following interventions: dietary management education, guidance, and counseling, encourage exercise, monitor weight and prescribed dietary intake. · Kidney Stone: Stable. Referral to urology  · Low Back Pain nd Hx of Compression Fracture: chronic, uncontrolled. Referral to pain management for further evaluation. · HTN: Chronic, stable. Continue to monitor. · AVTAR: Chronic, discussed scheduled follow up with sleep medicine  · SSS/Pacemaker: Chronic, stable. Follow up with cardiology as scheduled. · Smoker: Chronic, uncontrolled. Restart Bupropion (discussed keeping medications as work instead of at the house)  · HLD: chronic stable. Check lipids and continue Lipitor. · IFG: Chronic, unclear control. Check A1c      I have discussed the diagnosis with the patient and the intended plan as seen in the above orders. The patient has received an after-visit summary along with patient information handout. I have discussed medication side effects and warnings with the patient as well. Disposition  Follow-up and Dispositions    · Return in about 4 weeks (around 7/19/2021) for cigarette smoking.            Stephanie Talamantes MD

## 2021-07-07 ENCOUNTER — TELEPHONE (OUTPATIENT)
Dept: FAMILY MEDICINE CLINIC | Age: 66
End: 2021-07-07

## 2021-07-07 NOTE — TELEPHONE ENCOUNTER
----- Message from [facility] sent at 7/7/2021 10:03 AM EDT -----  Regarding: MD Woodward/telephone  Patient return call    Caller's first and last name and relationship (if not the patient):N/A      Best contact number(s):[phone]      Whose call is being returned:N/A      Details to clarify the request:Patient stated that she has received a call and missed it, please call back at the earliest.      [facility]

## 2021-07-07 NOTE — TELEPHONE ENCOUNTER
Patient called and said she has a appt with [facility] on 7/9/21, at [facility].  Requesting Urology referral, said she discussed the referral  with the doctor 6/21/21, and he said he would write the referral.    BCB# [phone]

## 2021-07-08 NOTE — TELEPHONE ENCOUNTER
Pt needs a call back on the status of her referral request. Pt stated she will call every 15 minutes until she receives an answer.

## 2021-07-08 NOTE — TELEPHONE ENCOUNTER
Patient called again regarding urology referral. Pt stated that the appt is 7/9/21 at 7:45 am. Provided a alternate fax number.       Pt's Office fax# [phone]

## 2021-07-23 ENCOUNTER — HOSPITAL ENCOUNTER (OUTPATIENT)
Dept: MAMMOGRAPHY | Age: 66
Discharge: HOME | End: 2021-07-23
Attending: FAMILY MEDICINE
Payer: MEDICARE

## 2021-07-23 DIAGNOSIS — Z12.31 BREAST CANCER SCREENING BY MAMMOGRAM: ICD-10-CM

## 2021-07-23 PROCEDURE — 77067 SCR MAMMO BI INCL CAD: CPT

## 2021-07-26 ENCOUNTER — TELEPHONE (OUTPATIENT)
Dept: FAMILY MEDICINE CLINIC | Age: 66
End: 2021-07-26

## 2021-07-26 NOTE — TELEPHONE ENCOUNTER
----- Message from Hanna Hester sent at 7/26/2021  8:12 AM EDT -----  Regarding: Dr. Audrey Price Message/Vendor Calls    Caller's first and last name: Iris Capellan      Reason for call:patient exposed to family members with covid what should she do      Callback required yes/no and why:yes      Best contact number(s):  [phone]    Details to clarify the request: patient is exposed to covid with no symptoms now what do you suggest?      Hanna Hester

## 2021-07-27 NOTE — PROGRESS NOTES
Please let patient know that her cholesterol labs are extremely elevated from last check. Please let her know that I would like to increase the dose of her current cholesterol medication. Is she agreeable to this?     Faina Ramirez MD

## 2021-07-28 NOTE — TELEPHONE ENCOUNTER
Spoke to pt ans she states that she had decided to go to [facility] to get tested. She reports that it came back negative. Nothing needed.

## 2021-07-29 DIAGNOSIS — E78.5 HYPERLIPIDEMIA, UNSPECIFIED HYPERLIPIDEMIA TYPE: ICD-10-CM

## 2021-07-29 RX ORDER — ROSUVASTATIN CALCIUM 10 MG/1
10 TABLET, COATED ORAL
Qty: 90 TABLET | Refills: 3 | Status: SHIPPED | OUTPATIENT
Start: 2021-07-29 | End: 2022-08-23 | Stop reason: SDUPTHER

## 2021-07-29 NOTE — PROGRESS NOTES
PI of results, she said that she is taking the liptior 10 mg everyday without fail. Pt wants rx sent to [place] on file.   Let her know that she will need f/u appt in 3 months to recheck

## 2021-07-29 NOTE — PROGRESS NOTES
Called patient and discussed recently elevated lipids. Discussed switching from atorvastatin to crestor. She is in agreement. She agrees to stop taking atorvastatin once she starts taking crestor. ICD-10-CM ICD-9-CM    1.  Hyperlipidemia, unspecified hyperlipidemia type  E78.5 272.4 rosuvastatin (CRESTOR) 10 mg tablet     Medications Discontinued During This Encounter   Medication Reason    atorvastatin (LIPITOR) 10 mg tablet Therapy Completed

## 2021-08-02 NOTE — TELEPHONE ENCOUNTER
The referral was written by Dr. Ramiro Landau on 6/21/2021.   Referral was completed, the referral due to patient having Andrade Danes is needing an approval.  Evelia Carias LPN

## 2021-08-03 ENCOUNTER — OFFICE VISIT (OUTPATIENT)
Dept: CARDIOLOGY CLINIC | Age: 66
End: 2021-08-03

## 2021-08-03 ENCOUNTER — CLINICAL SUPPORT (OUTPATIENT)
Dept: CARDIOLOGY CLINIC | Age: 66
End: 2021-08-03
Payer: MEDICARE

## 2021-08-03 VITALS — BODY MASS INDEX: 37.73 KG/M2 | RESPIRATION RATE: 16 BRPM | HEIGHT: 64 IN | HEART RATE: 80 BPM | WEIGHT: 221 LBS

## 2021-08-03 DIAGNOSIS — Z95.0 CARDIAC PACEMAKER IN SITU: Primary | ICD-10-CM

## 2021-08-03 DIAGNOSIS — I47.29 NSVT (NONSUSTAINED VENTRICULAR TACHYCARDIA): ICD-10-CM

## 2021-08-03 DIAGNOSIS — I47.1 PAT (PAROXYSMAL ATRIAL TACHYCARDIA) (HCC): ICD-10-CM

## 2021-08-03 DIAGNOSIS — E66.01 OBESITY, MORBID (HCC): ICD-10-CM

## 2021-08-03 DIAGNOSIS — I49.5 SICK SINUS SYNDROME (HCC): ICD-10-CM

## 2021-08-03 DIAGNOSIS — I10 ESSENTIAL HYPERTENSION: ICD-10-CM

## 2021-08-03 PROCEDURE — G8427 DOCREV CUR MEDS BY ELIG CLIN: HCPCS | Performed by: INTERNAL MEDICINE

## 2021-08-03 PROCEDURE — 93280 PM DEVICE PROGR EVAL DUAL: CPT | Performed by: INTERNAL MEDICINE

## 2021-08-03 PROCEDURE — G9899 SCRN MAM PERF RSLTS DOC: HCPCS | Performed by: INTERNAL MEDICINE

## 2021-08-03 PROCEDURE — G8536 NO DOC ELDER MAL SCRN: HCPCS | Performed by: INTERNAL MEDICINE

## 2021-08-03 PROCEDURE — G8417 CALC BMI ABV UP PARAM F/U: HCPCS | Performed by: INTERNAL MEDICINE

## 2021-08-03 PROCEDURE — 3017F COLORECTAL CA SCREEN DOC REV: CPT | Performed by: INTERNAL MEDICINE

## 2021-08-03 PROCEDURE — 1101F PT FALLS ASSESS-DOCD LE1/YR: CPT | Performed by: INTERNAL MEDICINE

## 2021-08-03 PROCEDURE — 99214 OFFICE O/P EST MOD 30 MIN: CPT | Performed by: INTERNAL MEDICINE

## 2021-08-03 PROCEDURE — G8756 NO BP MEASURE DOC: HCPCS | Performed by: INTERNAL MEDICINE

## 2021-08-03 PROCEDURE — G8399 PT W/DXA RESULTS DOCUMENT: HCPCS | Performed by: INTERNAL MEDICINE

## 2021-08-03 PROCEDURE — 1090F PRES/ABSN URINE INCON ASSESS: CPT | Performed by: INTERNAL MEDICINE

## 2021-08-03 PROCEDURE — G8432 DEP SCR NOT DOC, RNG: HCPCS | Performed by: INTERNAL MEDICINE

## 2021-08-03 NOTE — PROGRESS NOTES
Cardiac Electrophysiology Office Note     Subjective:      Nathanael Beavers is a 77 y.o. patient who presents today for follow up of dual chamber pacemaker    She did not come in May     On 30 day loop monitor before pacer, she had PACs, PVCs, sinus bradycardia, & junctional rhythm that was associated with dizziness. She was unable to tolerate medication. Previous:  Loop monitor (04/2019): PVCs, PACs, sinus bradycardia, junctional rhythm    Stress echo (03/28/2019): Normal.    General cardiologist is Dr. Kenny. History ICH. No family history of pacemaker.     Problem List  Date Reviewed: 8/4/2021        Codes Class Noted    Hx of compression fracture of spine ICD-10-CM: Z87.81  ICD-9-CM: V15.51  6/27/2021        Impaired fasting glucose ICD-10-CM: R73.01  ICD-9-CM: 790.21  6/27/2021        Osteopenia of multiple sites ICD-10-CM: M85.89  ICD-9-CM: 733.90  6/27/2021        Hemorrhoids ICD-10-CM: K64.9  ICD-9-CM: 455.6  3/8/2020        AVTAR (obstructive sleep apnea) ICD-10-CM: G47.33  ICD-9-CM: 327.23  3/8/2020        Family history of melanoma ICD-10-CM: Z80.8  ICD-9-CM: V16.8  3/4/2020    Overview Signed 3/4/2020  3:22 PM by Dickson Gosselin, MD     Reports that her father had melanoma  Sister recently had squamous cell removed from her neck\  Patient has area under right breast that she is concerned about             Tobacco use ICD-10-CM: Z72.0  ICD-9-CM: 305.1  3/4/2020    Overview Addendum 3/4/2020  3:36 PM by Dickson Gosselin, MD     [place]: 60 years  PPD: 1   PY: 60  Current Status: smoking  Prior Attempts to Quit: quit in May, 2019 with Zyban but restarted in Nov, 2019             Hx of anaphylaxis ICD-10-CM: Z87.892  ICD-9-CM: V13.81  3/4/2020    Overview Signed 3/4/2020  3:49 PM by Dickson Gosselin, MD     Allergic to wasps  Had to go to ER and experience throat closing up             Sick sinus syndrome [facility]) ICD-10-CM: I49.5  ICD-9-CM: 427.81  7/26/2019    Overview Addendum 3/4/2020  3:31 PM by Abril Lazo MD     S/p PPM in July, 2019 by Dr. Reginold Bumpers  She had palpitations last week and went to [facility], where she had an EKG and PM interrogation that she reports as normal  She states that she has not had any symptoms since, and she believes that it may have been related to stress             Pacemaker ICD-10-CM: Z95.0  ICD-9-CM: V45.01  7/26/2019    Overview Signed 7/26/2019  8:50 AM by Poncho Alcantara MD     7/26/2019 dual chamber pacemaker [employer]             Subcutaneous masses, trunk ICD-10-CM: R22.9  ICD-9-CM: 782.2  2/28/2018    Overview Signed 3/4/2020  3:42 PM by Abril Lazo MD     Offered excision by general surgery in 2018, but she declines  03/04/20 : she reports that the pain has improved since changing her diet             Obesity, morbid ([address] ICD-10-CM: E66.01  ICD-9-CM: 278.01  2/7/2018    Overview Signed 3/4/2020  3:33 PM by Abril Lazo MD     Diet: she reports that she has gone vegan for the last three weeks  Exercise: she recently signed up for silver sneakers and tries to walk 5000 steps per day             Hypertension ICD-10-CM: I10  ICD-9-CM: 401.9  7/28/2015    Overview Signed 3/4/2020  3:44 PM by Abril Lazo MD     Medicine: Lisinopril-HCTZ 20-25 daily  Intterval History: 03/04/20: checking BP at home which have been elevated when she was stressed, but will come back down to normal range with mediatation and calming herself                     Allergies   Allergen Reactions    Gluten Other (comments)    Chloromycetin [Chloramphenicol Sod Succinate] Other (comments)     Anemia       Past Medical History:   Diagnosis Date    Arrhythmia     sick sinus syndrome-has demand pacemaker    Arthritis     osteoarthritis-left foot and knee    High cholesterol     Hypertension     Ill-defined condition 2015    frontal lobe bleed    Menopause     Musculoskeletal disorder      Past Surgical History:   Procedure Laterality Date    COLONOSCOPY N/A 8/4/2020 COLONOSCOPY performed by Saba White MD at [address] HX ABDOMINAL LAPAROSCOPY      HX APPENDECTOMY      HX TONSILLECTOMY      HX [facility] INS NEW/RPLCMT PRM PM W/TRANSV ELTRD ATRIAL&VENT N/A 7/26/2019    INSERT PPM DUAL performed by Marylen Skill, MD at [address], [facility]     Family History   Problem Relation Age of Onset    Heart Disease Mother     Hypertension Mother     Diabetes Mother     Heart Disease Father     Diabetes Father     Hypertension Father      Social History     Tobacco Use    Smoking status: Current Every Day Smoker     Packs/day: 0.50     Years: 42.00     Pack years: 21.00     Types: Cigarettes    Smokeless tobacco: Never Used   Substance Use Topics    Alcohol use: Not Currently        Review of Systems:   Constitutional: Negative for fever, chills, weight loss, malaise/fatigue. HEENT: Negative for nosebleeds, vision changes. Respiratory: Negative for cough, hemoptysis  Cardiovascular: Negative for chest pain, palpitations, no orthopnea, claudication, leg swelling, syncope, and PND. Gastrointestinal: Negative for nausea, vomiting, diarrhea, blood in stool and melena. Genitourinary: Negative for dysuria, and hematuria. Musculoskeletal: Negative for myalgias, arthralgia. Skin: Negative for rash. Heme: Does not bleed or bruise easily. Neurological: Negative for speech change and focal weakness     Objective:     Visit Vitals  Pulse 80   Resp 16   Ht 5' 4\" (1.626 m)   Wt 221 lb (100.2 kg)   BMI 37.93 kg/m²      Physical Exam:   Constitutional: well-developed and well-nourished. No respiratory distress. Head: Normocephalic and atraumatic. Eyes: Pupils are equal, round  ENT: hearing normal  Neck: supple. No JVD present. Cardiovascular: Normal rate, regular rhythm. Exam reveals no gallop and no friction rub. No murmur heard. Pulmonary/Chest: Effort normal and breath sounds normal. No wheezes. Abdominal: Soft, no tenderness. Obese.  Musculoskeletal: no edema. Neurological: alert,oriented. Skin: healed pacer pocket  Psychiatric: normal mood and affect. Behavior is normal. Judgment and thought content normal.      Assessment/Plan:         ICD-10-CM ICD-9-CM    1. Cardiac pacemaker in situ  Z95.0 V45.01    2. NSVT (nonsustained ventricular tachycardia) ([facility])  I47.2 427.1 NUCLEAR CARDIAC STRESS TEST   3. PAT (paroxysmal atrial tachycardia) ([facility])  I47.1 427.0 NUCLEAR CARDIAC STRESS TEST   4. Sick sinus syndrome (HCC)  I49.5 427.81    5. Essential hypertension  I10 401.9    6. Obesity, morbid ([address]  E66.01 278.01    bp is controlled    Her pacemaker is checked and discussed today:  11 year left on battery  36% RV pacing  Stress echo 2 years ago concluded normal but she has NSVT and PAT on pacemaker   I recommend lexiscan cardiolite stress test since her exercise capacity was 6 minutes    Addendum  Nuclear stress test reported as normal 8/2021    Future Appointments   Date Time Provider Margi Fountain   8/12/2021  8:00 AM NUCLEAR, [facility] [place] BS AMB   11/10/2021  1:30 PM REMOTE1, [facility] BS AMB   2/16/2022 10:45 AM REMOTE1, [facility] BS AMB   5/25/2022  8:30 AM REMOTE1, [facility] BS AMB   8/4/2022  1:20 PM MD YADIRA Beltran BS AMB   8/23/2022  9:00 AM PACEMAKER3, [facility] BS AMB   8/23/2022  9:20 AM Taz Whitt MD CAVKATELYNN BS AMB       Thank you for involving me in this patient's care and please call with further concerns or questions. Bonny Eubanks M.D.   Electrophysiology/Cardiology  [facility] [place]  [address], [address] , [address]  [address]  [phone]

## 2021-08-16 ENCOUNTER — ANCILLARY PROCEDURE (OUTPATIENT)
Dept: CARDIOLOGY CLINIC | Age: 66
End: 2021-08-16
Payer: MEDICARE

## 2021-08-16 VITALS
HEIGHT: 64 IN | WEIGHT: 221 LBS | BODY MASS INDEX: 37.73 KG/M2 | SYSTOLIC BLOOD PRESSURE: 126 MMHG | DIASTOLIC BLOOD PRESSURE: 76 MMHG

## 2021-08-16 DIAGNOSIS — E66.01 OBESITY, MORBID (HCC): ICD-10-CM

## 2021-08-16 DIAGNOSIS — I47.29 NSVT (NONSUSTAINED VENTRICULAR TACHYCARDIA): ICD-10-CM

## 2021-08-16 DIAGNOSIS — I47.1 PAT (PAROXYSMAL ATRIAL TACHYCARDIA) (HCC): ICD-10-CM

## 2021-08-16 DIAGNOSIS — I49.5 SICK SINUS SYNDROME (HCC): ICD-10-CM

## 2021-08-16 DIAGNOSIS — I10 ESSENTIAL HYPERTENSION: ICD-10-CM

## 2021-08-16 DIAGNOSIS — Z95.0 PACEMAKER: ICD-10-CM

## 2021-08-16 PROCEDURE — 93015 CV STRESS TEST SUPVJ I&R: CPT | Performed by: SPECIALIST

## 2021-08-16 PROCEDURE — 78452 HT MUSCLE IMAGE SPECT MULT: CPT | Performed by: SPECIALIST

## 2021-08-16 PROCEDURE — A9500 TC99M SESTAMIBI: HCPCS | Performed by: SPECIALIST

## 2021-08-16 RX ORDER — TETRAKIS(2-METHOXYISOBUTYLISOCYANIDE)COPPER(I) TETRAFLUOROBORATE 1 MG/ML
30 INJECTION, POWDER, LYOPHILIZED, FOR SOLUTION INTRAVENOUS ONCE
Status: COMPLETED | OUTPATIENT
Start: 2021-08-16 | End: 2021-08-16

## 2021-08-16 RX ORDER — TETRAKIS(2-METHOXYISOBUTYLISOCYANIDE)COPPER(I) TETRAFLUOROBORATE 1 MG/ML
10 INJECTION, POWDER, LYOPHILIZED, FOR SOLUTION INTRAVENOUS ONCE
Status: COMPLETED | OUTPATIENT
Start: 2021-08-16 | End: 2021-08-16

## 2021-08-16 RX ADMIN — TETRAKIS(2-METHOXYISOBUTYLISOCYANIDE)COPPER(I) TETRAFLUOROBORATE 25.6 MILLICURIE: 1 INJECTION, POWDER, LYOPHILIZED, FOR SOLUTION INTRAVENOUS at 11:50

## 2021-08-16 RX ADMIN — TETRAKIS(2-METHOXYISOBUTYLISOCYANIDE)COPPER(I) TETRAFLUOROBORATE 8.5 MILLICURIE: 1 INJECTION, POWDER, LYOPHILIZED, FOR SOLUTION INTRAVENOUS at 10:05

## 2021-08-18 ENCOUNTER — TELEPHONE (OUTPATIENT)
Dept: CARDIOLOGY CLINIC | Age: 66
End: 2021-08-18

## 2021-08-18 NOTE — TELEPHONE ENCOUNTER
----- Message from Genet Gordon MD sent at 8/18/2021 12:41 PM EDT -----  Normal stress test  Continue follow up appts

## 2021-09-20 NOTE — TELEPHONE ENCOUNTER
[place]          : [birth year]  PA Info for patient's scheduled appt on 2021, Uro will have to submit denied claim info to [facility] for RETRO/backdated approval for DOS on 2021  Uro/PA#[id number] APPROVED 2 visits 21-22 to     Thanks  Sophia BARNARD/Referral Specialist  [facility]   Direct ph #[phone]

## 2021-10-01 ENCOUNTER — TELEPHONE (OUTPATIENT)
Dept: CARDIOLOGY CLINIC | Age: 66
End: 2021-10-01

## 2021-10-01 NOTE — TELEPHONE ENCOUNTER
Attempted to reach patient by telephone. A message was left for return call.      Will send mychart message as well

## 2021-10-06 ENCOUNTER — TELEPHONE (OUTPATIENT)
Dept: CARDIOLOGY CLINIC | Age: 66
End: 2021-10-06

## 2021-10-06 NOTE — TELEPHONE ENCOUNTER
Verified patient with two types of identifiers. Patient currently on a work call. Notified patient [employer] message sent with device findings. Patient will read mychart. Patient verbalized understanding and will call with any other questions.

## 2021-10-06 NOTE — TELEPHONE ENCOUNTER
Patient is returning Theletra call and she is only available up until 9:30 am because she's at work up until 6 pm.    Phone [phone]

## 2021-10-14 ENCOUNTER — HOSPITAL ENCOUNTER (EMERGENCY)
Age: 66
Discharge: HOME | End: 2021-10-14
Attending: EMERGENCY MEDICINE
Payer: MEDICARE

## 2021-10-14 ENCOUNTER — APPOINTMENT (OUTPATIENT)
Dept: CT IMAGING | Age: 66
End: 2021-10-14
Attending: EMERGENCY MEDICINE
Payer: MEDICARE

## 2021-10-14 VITALS
DIASTOLIC BLOOD PRESSURE: 91 MMHG | TEMPERATURE: 98.3 F | HEIGHT: 64 IN | HEART RATE: 76 BPM | RESPIRATION RATE: 16 BRPM | WEIGHT: 220 LBS | OXYGEN SATURATION: 98 % | SYSTOLIC BLOOD PRESSURE: 168 MMHG | BODY MASS INDEX: 37.56 KG/M2

## 2021-10-14 DIAGNOSIS — S09.90XA INJURY OF HEAD, INITIAL ENCOUNTER: Primary | ICD-10-CM

## 2021-10-14 DIAGNOSIS — S00.03XA CONTUSION OF SCALP, INITIAL ENCOUNTER: ICD-10-CM

## 2021-10-14 PROCEDURE — 74011250637 HC RX REV CODE- 250/637: Performed by: EMERGENCY MEDICINE

## 2021-10-14 PROCEDURE — 70450 CT HEAD/BRAIN W/O DYE: CPT

## 2021-10-14 PROCEDURE — 99283 EMERGENCY DEPT VISIT LOW MDM: CPT

## 2021-10-14 RX ORDER — ACETAMINOPHEN 325 MG/1
650 TABLET ORAL
Status: COMPLETED | OUTPATIENT
Start: 2021-10-14 | End: 2021-10-14

## 2021-10-14 RX ADMIN — ACETAMINOPHEN 650 MG: 325 TABLET ORAL at 11:38

## 2021-10-14 NOTE — ED PROVIDER NOTES
64-year-old female who hit her head this morning on a bunk bed. She was standing up from a sitting position. She saw stars but did not lose consciousness. He is now complaining of a persistent headache. No nausea vomiting, visual changes. She has had no weakness or numbness. No pain medications taken. Patient states she had a head injury a few years ago that resulted in a intracranial hemorrhage that she did not get evaluated for until a week after her injury. She takes daily aspirin but no other blood thinners. No other complaints at this time.            Past Medical History:   Diagnosis Date    Arrhythmia     sick sinus syndrome-has demand pacemaker    Arthritis     osteoarthritis-left foot and knee    High cholesterol     Hypertension     Ill-defined condition 2015    frontal lobe bleed    Menopause     Musculoskeletal disorder        Past Surgical History:   Procedure Laterality Date    COLONOSCOPY N/A 8/4/2020    COL[facility] Maximo [facility] HX ABDOMINAL LAPAROSCOPY      HX APPENDECTOMY      HX TONSILLECTOMY      HX TUBAL LIGATION  1989    [place] NEW/RPLCMT PRM PM W/TRANSV ELTRD ATRIAL&VENT N/A 7/26/2019    INSERT PPM DUAL performed by Vi Golden MD at [address] [facility] [address] [facility]         Family History:   Problem Relation Age of Onset    Heart Disease Mother     Hypertension Mother     Diabetes Mother     Heart Disease Father     Diabetes Father     Hypertension Father        Social History     Socioeconomic History    Marital status:      Spouse name: Not on file    Number of children: Not on file    Years of education: Not on file    Highest education level: Not on file   Occupational History    Not on file   Tobacco Use    Smoking status: Current Some Day Smoker     Packs/day: 0.25     Years: 42.00     Pack years: 10.50     Types: Cigarettes    Smokeless tobacco: Never Used   Substance and Sexual Activity    Alcohol use: Not Currently  Drug use: No    Sexual activity: Never   Other Topics Concern    Not on file   Social History Narrative    Not on file     Social Determinants of Health     Financial Resource Strain:     Difficulty of Paying Living Expenses:    Food Insecurity:     Worried About Running Out of Food in the Last Year:     [address] in the Last Year:    Transportation Needs:     Lack of Transportation (Medical):  Lack of Transportation (Non-Medical):    Physical Activity:     Days of Exercise per Week:     Minutes of Exercise per Session:    Stress:     Feeling of Stress :    Social Connections:     Frequency of Communication with Friends and Family:     Frequency of Social Gatherings with Friends and Family:     Attends Worship Services:     Active Member of Clubs or Organizations:     Attends Club or Organization Meetings:     Marital Status:    Intimate Partner Violence:     Fear of Current or Ex-Partner:     Emotionally Abused:     Physically Abused:     Sexually Abused: ALLERGIES: Gluten and Chloromycetin [chloramphenicol sod succinate]    Review of Systems   Constitutional: Negative for fever. HENT: Negative for facial swelling. Eyes: Negative for visual disturbance. Respiratory: Negative for chest tightness. Cardiovascular: Negative for chest pain. Gastrointestinal: Negative for abdominal pain. Genitourinary: Negative for difficulty urinating and dysuria. Musculoskeletal: Negative for arthralgias. Skin: Negative for rash. Neurological: Negative for headaches. Hematological: Negative for adenopathy. Psychiatric/Behavioral: Negative for suicidal ideas. Vitals:    10/14/21 1054   BP: (!) 168/91   Pulse: 76   Resp: 16   Temp: 98.3 °F (36.8 °C)   SpO2: 98%   Weight: 99.8 kg (220 lb)   Height: 5' 4\" (1.626 m)            Physical Exam  Vitals and nursing note reviewed. Constitutional:       General: She is not in acute distress.      Appearance: She is well-developed. HENT:      Head: Normocephalic and atraumatic. Eyes:      General: No scleral icterus. Conjunctiva/sclera: Conjunctivae normal.      Pupils: Pupils are equal, round, and reactive to light. Cardiovascular:      Rate and Rhythm: Normal rate. Heart sounds: No murmur heard. Pulmonary:      Effort: Pulmonary effort is normal. No respiratory distress. Abdominal:      General: There is no distension. Musculoskeletal:         General: Normal range of motion. Cervical back: Normal range of motion and neck supple. Skin:     General: Skin is warm and dry. Findings: No rash. Neurological:      Mental Status: She is alert and oriented to person, place, and time. MDM  Number of Diagnoses or Management Options  Contusion of scalp, initial encounter  Injury of head, initial encounter  Diagnosis management comments: Assessment: Head CT was unremarkable. Stable for discharge home.        Amount and/or Complexity of Data Reviewed  Tests in the radiology section of CPT®: reviewed           Procedures

## 2021-10-14 NOTE — ED NOTES
Discharge instructions given to patient by MD and nurse. Pt has been given counseling  and verbalizes understanding. Pt ambulated off of unit in no signs of distress.

## 2021-10-14 NOTE — ED TRIAGE NOTES
Patient arrives after hitting her head on a bunk bed. +headache. Denies N/V or vision changes. Patient states 2016 she injured her head and had a brain bleed.

## 2021-10-14 NOTE — LETTER
[address] [address]  [phone]    Work/School Note    Date: 10/14/2021    To Whom It May concern:      Angela Wright was seen and treated today in the emergency room by the following provider(s):  Attending Provider: Luciano Broussard MD.      Angela Wright is excused from work/school on 10/14/21. She is clear to return to work/school on 10/15/21.         Sincerely,          Jacki Drew, RN

## 2021-10-15 ENCOUNTER — PATIENT OUTREACH (OUTPATIENT)
Dept: CASE MANAGEMENT | Age: 66
End: 2021-10-15

## 2021-10-15 RX ORDER — DILTIAZEM HYDROCHLORIDE 120 MG/1
120 CAPSULE, COATED, EXTENDED RELEASE ORAL DAILY
Qty: 90 CAPSULE | Refills: 1 | Status: SHIPPED | OUTPATIENT
Start: 2021-10-15 | End: 2022-04-26 | Stop reason: SDUPTHER

## 2021-10-15 NOTE — PROGRESS NOTES
Ambulatory Care Management Note      Date/Time:  10/15/2021 9:45 AM    This patient was received as a referral from Daily assignment. Ambulatory Care Manager outreached to patient today to offer care management services. Introduction to self and role of care manager provided. Patient accepted care management services at this time. Patient seen in ED 10/14/21 for head injury. Patient states she had bent over to pick something up off the floor. States she had \"cleared\" the bunk bed when standing back upright. Patient states she has a \"slight\" headache today. Patient denies needs surrounding injury. Top Challenges reviewed with the provider   *medication management cholesterol and cardiac meds               Ambulatory  contacted patient for discussion and case management of chronic disease meds. Summary of patients top problems: Medication Adjustments  1. Cholesterol: Patient last seen 6/21/21. Patient noted with elevated lab values. Medication adjusted. patient states she is currently taking Crestor 10mg daily. Patient to follow up in 3 mos. No appointment scheduled at this time. 2. B/P: patient currently taking Lisinopril-HCTZ daily. Patient states B/P continue to be high. Requesting potential medication adjustments for better control. Per cardiology 10/1/21 encounter patient to start new medication for better rate control. Patient has not yet confirmed medication with cardiology. Patient to request new med be sent to pharmacy.    Patient's challenges to self management identified:   medication management      Medication Management:  poor adherence and poor understanding    Advance Care Planning:   Does patient have an Advance Directive:  deferred     [employer], Referrals, and Durable Medical Equipment: none at time time    PCP/Specialist follow up:   Future Appointments   Date Time Provider Margi Fountain   11/10/2021  1:30 PM REMOTE1, [facility] [place]   2/16/2022 10:45 AM REMOTE1, [facility] [place] AMB   5/25/2022  8:30 AM REMOTE1, [facility] [place] AMB   8/4/2022  1:20 PM MD YADIRA Ta AMB   8/23/2022  9:00 AM PACEMAKER3, [facility] [place] AMB   8/23/2022  9:20 AM MD YADIRA Ta BS AMB          Goals      Knowledge and adherence of prescribed medication (ie. action, side effects, missed dose, etc.).      10/15/21  Last PCP O/V 6/21/21. Patient noted with elevated cholesterol. PCP made medication adjustments. Patient to follow up with PCP in 3 mos. Reviewed labs with patient. Confirmed medication regimen with patient. Patient to schedule follow up visit with PCP to include fasting labs prior to next ACM outreach. PM     10/15/21  Cardiology: Per 10/1/21 message, patient noted with few, brief episodes of a faster heart beat. Provider to make medication adjustment to Diltiazem  mg daily. Reviewed purpose of medication with patient. Patient has not started new medication. Patient will contact cardiologist today to confirm new script sent to pharmacy. ACM to follow up with patient. PM             Patient verbalized understanding of all information discussed. Patient has this Ambulatory Care Manager's contact information for any further questions, concerns, or needs.

## 2021-11-10 ENCOUNTER — OFFICE VISIT (OUTPATIENT)
Dept: CARDIOLOGY CLINIC | Age: 66
End: 2021-11-10
Payer: MEDICARE

## 2021-11-10 DIAGNOSIS — Z95.0 CARDIAC PACEMAKER IN SITU: Primary | ICD-10-CM

## 2021-11-10 PROCEDURE — 93294 REM INTERROG EVL PM/LDLS PM: CPT | Performed by: INTERNAL MEDICINE

## 2021-11-10 PROCEDURE — 93296 REM INTERROG EVL PM/IDS: CPT | Performed by: INTERNAL MEDICINE

## 2021-11-10 NOTE — LETTER
11/10/2021 9:41 AM    Ms. Neftaly German  [address] [facility] [address]            This letter confirms that we have received your scheduled remote check of your implanted     device on 11-10-21  . Our EP team will contact you via phone if there are significant abnormal    findings. Your next remote check from home is scheduled for 2-16-22  . If you have any questions, please call [address] [place] [address] at [phone].                Sincerely,    Savannah Ochoa MD [facility]

## 2021-11-17 ENCOUNTER — OFFICE VISIT (OUTPATIENT)
Dept: FAMILY MEDICINE CLINIC | Age: 66
End: 2021-11-17
Payer: MEDICARE

## 2021-11-17 VITALS — WEIGHT: 226 LBS | BODY MASS INDEX: 38.58 KG/M2 | HEIGHT: 64 IN

## 2021-11-17 DIAGNOSIS — Z23 ENCOUNTER FOR IMMUNIZATION: ICD-10-CM

## 2021-11-17 DIAGNOSIS — M54.2 TENDERNESS OF HEAD AND NECK: Primary | ICD-10-CM

## 2021-11-17 DIAGNOSIS — M54.42 CHRONIC LEFT-SIDED LOW BACK PAIN WITH BILATERAL SCIATICA: ICD-10-CM

## 2021-11-17 DIAGNOSIS — G89.29 CHRONIC LEFT-SIDED LOW BACK PAIN WITH BILATERAL SCIATICA: ICD-10-CM

## 2021-11-17 DIAGNOSIS — M54.41 CHRONIC LEFT-SIDED LOW BACK PAIN WITH BILATERAL SCIATICA: ICD-10-CM

## 2021-11-17 DIAGNOSIS — Z78.0 POST-MENOPAUSAL: ICD-10-CM

## 2021-11-17 DIAGNOSIS — M85.80 OSTEOPENIA, UNSPECIFIED LOCATION: ICD-10-CM

## 2021-11-17 DIAGNOSIS — G47.33 OSA (OBSTRUCTIVE SLEEP APNEA): ICD-10-CM

## 2021-11-17 DIAGNOSIS — I10 ESSENTIAL HYPERTENSION: ICD-10-CM

## 2021-11-17 DIAGNOSIS — N20.0 KIDNEY STONE: ICD-10-CM

## 2021-11-17 DIAGNOSIS — F17.200 SMOKER: ICD-10-CM

## 2021-11-17 DIAGNOSIS — E78.5 HYPERLIPIDEMIA, UNSPECIFIED HYPERLIPIDEMIA TYPE: ICD-10-CM

## 2021-11-17 DIAGNOSIS — R51.9 TENDERNESS OF HEAD AND NECK: Primary | ICD-10-CM

## 2021-11-17 PROCEDURE — 99214 OFFICE O/P EST MOD 30 MIN: CPT | Performed by: FAMILY MEDICINE

## 2021-11-17 PROCEDURE — G8536 NO DOC ELDER MAL SCRN: HCPCS | Performed by: FAMILY MEDICINE

## 2021-11-17 PROCEDURE — G8427 DOCREV CUR MEDS BY ELIG CLIN: HCPCS | Performed by: FAMILY MEDICINE

## 2021-11-17 PROCEDURE — 1090F PRES/ABSN URINE INCON ASSESS: CPT | Performed by: FAMILY MEDICINE

## 2021-11-17 PROCEDURE — G8756 NO BP MEASURE DOC: HCPCS | Performed by: FAMILY MEDICINE

## 2021-11-17 PROCEDURE — 1101F PT FALLS ASSESS-DOCD LE1/YR: CPT | Performed by: FAMILY MEDICINE

## 2021-11-17 PROCEDURE — G8399 PT W/DXA RESULTS DOCUMENT: HCPCS | Performed by: FAMILY MEDICINE

## 2021-11-17 PROCEDURE — G8432 DEP SCR NOT DOC, RNG: HCPCS | Performed by: FAMILY MEDICINE

## 2021-11-17 PROCEDURE — G9899 SCRN MAM PERF RSLTS DOC: HCPCS | Performed by: FAMILY MEDICINE

## 2021-11-17 PROCEDURE — 90686 IIV4 VACC NO PRSV 0.5 ML IM: CPT | Performed by: FAMILY MEDICINE

## 2021-11-17 PROCEDURE — G0008 ADMIN INFLUENZA VIRUS VAC: HCPCS | Performed by: FAMILY MEDICINE

## 2021-11-17 PROCEDURE — G8417 CALC BMI ABV UP PARAM F/U: HCPCS | Performed by: FAMILY MEDICINE

## 2021-11-17 PROCEDURE — 3017F COLORECTAL CA SCREEN DOC REV: CPT | Performed by: FAMILY MEDICINE

## 2021-11-17 NOTE — PROGRESS NOTES
Mari Santana  74 y.o. female  1955  [facility] [address]  [id number]     [facility] PRACT[facility]       Encounter Date: 11/17/2021           Established Patient Visit Note: Suzie Dudley MD    Reason for Appointment:  Chief Complaint   Patient presents with    Follow-up       History of Present Illness:  History provided by patient    Mari Santana is a 74 y.o. female who presents to clinic today for:    · She reports that she recently the [address] booster   · Patinet in ER on 10/14/21 after hitting her head on bunk bed. She had head CT that did not reveal any acute abnormality, but she did have elevated blood pressure. She has since seen cardiology who started her on diltiazem, and she reports BP as improved. · SSS s/p Pacermaker i: She was recently seen by cardiology who started her on Diltiazem. · HTN: taking lisinopril-HCTZ and diltiazem; home BP. She is working to cut back on high sodium foods. · Kidney stones: she reports that she had follow up with urology with stable findings. She is following kidney stone prevention diet. · Chronic Low Back Pain and Hx of Compression Fracture of L4: she reports that she finds this to be stable with avoiding certain foods. · Osteopenia: last DEXA in March, 2019 with Risk of MOF 8.4% and HF 0.9%  · AVTAR: she has not yet followed up with sleep medicine, but she plans to do this soon  · Nicotine Dependence: she has cut back to < 5 cigarettes per day  · Hyperlipidemia: last lipids in June, 2021 showed chol 238 and ; she was started on Crestor.  She reports toleraing this well        Review of Systems  All other ROS were reviewed and are negative except as discussed in HPI        Allergies: Gluten and Chloromycetin [chloramphenicol sod succinate]    Medications: (Updated to reflect final medication list after visit)    Current Outpatient Medications:     dilTIAZem ER (CARDIZEM CD) 120 mg capsule, Take 1 Capsule by mouth daily. , Disp: 90 Capsule, Rfl: 1    rosuvastatin (CRESTOR) 10 mg tablet, Take 1 Tablet by mouth nightly., Disp: 90 Tablet, Rfl: 3    ubidecarenone (COQ-10 PO), Take 1 Capsule by mouth daily. , Disp: , Rfl:     melatonin 5 mg cap capsule, Take 5 mg by mouth nightly., Disp: , Rfl:     OTHER, Take 150 mg by mouth daily. NMN peptide  One scoop = 150 mg, Disp: , Rfl:     OTHER, resveratrol, Disp: , Rfl:     lisinopril-hydroCHLOROthiazide (PRINZIDE, ZESTORETIC) 20-25 mg per tablet, Take 1 Tablet by mouth daily. , Disp: 90 Tablet, Rfl: 1    zinc 50 mg tab tablet, Take  by mouth daily. , Disp: , Rfl:     MAGNESIUM CITRATE PO, Take 1,000 Capsules by mouth daily. , Disp: , Rfl:     EPINEPHrine (EPIPEN 2-ASHLEY) 0.3 mg/0.3 mL (1:1,000) injection, 0.3 mL by IntraMUSCular route once as needed for up to 1 dose., Disp: 1 Syringe, Rfl: 2    acetaminophen (TYLENOL) 325 mg tablet, Take 325 mg by mouth as needed for Pain. Indications: PAIN, head, Disp: , Rfl:     aspirin 81 mg chewable tablet, Take 81 mg by mouth daily. Indications: heart palpitations, Disp: , Rfl:     buPROPion SR (WELLBUTRIN SR) 150 mg SR tablet, Take one tablet daily for 3 days, then increase to one tablet twice a day. Separate doses by at least 8 hours, last dose no later than 6 pm; stop smoking after 5-7 days (Patient not taking: Reported on 11/17/2021), Disp: 60 Tablet, Rfl: 2    diphenhydrAMINE (BENADRYL) 25 mg capsule, Take 25 mg by mouth every four (4) hours as needed for Allergies.  (Patient not taking: Reported on 8/3/2021), Disp: , Rfl:     History  Patient Care Team:  Shahbaz Trevizo MD as PCP - General (Family Medicine)  Shahbaz Trevizo MD as PCP - [address] [place] [address] Provider  Erwin Romero MD (Cardiology)  Marilia Holley MD (Cardiology)  Zuleika Villa MD as Physician (Gastroenterology)  Yudith Anders, RN as Ambulatory Care Manager (Family Medicine)    Past Medical History: she has a past medical history of Arrhythmia, Arthritis, High cholesterol, Hypertension, Ill-defined condition (2015), Menopause, and Musculoskeletal disorder. Past Surgical History: she has a past surgical history that includes hx appendectomy; hx tonsillectomy; hx abdominal laparoscopy; hx tubal ligation (1989); pr ins new/rplcmt prm pm w/transv eltrd atrial&vent (N/A, 7/26/2019); and colonoscopy (N/A, 8/4/2020). Family Medical History: family history includes Diabetes in her father and mother; Heart Disease in her father and mother; Hypertension in her father and mother. Social History: she reports that she has been smoking cigarettes. She has a 10.50 pack-year smoking history. She has never used smokeless tobacco. She reports previous alcohol use. She reports that she does not use drugs. Health Maintenance Due   Topic Date Due    COVID-19 Vaccine (1) Never done    DTaP/Tdap/Td series (1 - Tdap) Never done    Shingrix Vaccine Age 50> (1 of 2) Never done    Pneumococcal 65+ years (2 of 2 - PPSV23) 03/04/2021       Objective:   Visit Vitals  Pulse (P) 97   Resp (P) 16   Ht 5' 4\" (1.626 m)   Wt 226 lb (102.5 kg)   SpO2 (P) 98%   BMI 38.79 kg/m²     Wt Readings from Last 3 Encounters:   11/17/21 226 lb (102.5 kg)   10/14/21 220 lb (99.8 kg)   08/16/21 221 lb (100.2 kg)       Physical Exam  Vitals and nursing note reviewed. Constitutional:       General: She is not in acute distress. Appearance: Normal appearance. HENT:      Head: Normocephalic and atraumatic. Nose: Nose normal.      Mouth/Throat:      Mouth: Mucous membranes are moist.   Eyes:      Extraocular Movements: Extraocular movements intact. Conjunctiva/sclera: Conjunctivae normal.      Pupils: Pupils are equal, round, and reactive to light. Neck:      Thyroid: Thyroid tenderness present. Cardiovascular:      Rate and Rhythm: Normal rate and regular rhythm. Pulses: Normal pulses. Heart sounds: Normal heart sounds. No murmur heard. No friction rub. No gallop. Pulmonary:      Effort: Pulmonary effort is normal. No respiratory distress. Breath sounds: Normal breath sounds. No wheezing, rhonchi or rales. Musculoskeletal:         General: Normal range of motion. Cervical back: Normal range of motion and neck supple. No rigidity. No muscular tenderness. Lymphadenopathy:      Cervical: No cervical adenopathy. Skin:     General: Skin is warm. Coloration: Skin is not jaundiced. Neurological:      General: No focal deficit present. Mental Status: She is alert. Mental status is at baseline. Cranial Nerves: Cranial nerves are intact. Motor: Motor function is intact. Coordination: Coordination is intact. Psychiatric:         Mood and Affect: Mood normal.         Behavior: Behavior normal.         Thought Content: Thought content normal.         Judgment: Judgment normal.           Assessment & Plan:      ICD-10-CM ICD-9-CM    1. Tenderness of head and neck  R51.9 784.99 US THYROID/PARATHYROID/SOFT TISS    M54.2  TSH RECEPTOR AB      THYROID PEROXIDASE (TPO) AB      TSH RECEPTOR AB      THYROID PEROXIDASE (TPO) AB   2. Encounter for immunization  Z23 V03.89 INFLUENZA VIRUS VACCINE, HIGH DOSE SEASONAL, PRESERVATIVE FREE   3. Post-menopausal  Z78.0 V49.81 DEXA BONE DENSITY STUDY AXIAL   4. Essential hypertension  I10 401.9 CBC W/O DIFF      URINALYSIS W/ RFLX MICROSCOPIC      TSH 3RD GENERATION      CBC W/O DIFF      URINALYSIS W/ RFLX MICROSCOPIC      TSH 3RD GENERATION   5. Hyperlipidemia, unspecified hyperlipidemia type  E78.5 272.4 LIPID PANEL      METABOLIC PANEL, COMPREHENSIVE      LIPID PANEL      METABOLIC PANEL, COMPREHENSIVE   6. Kidney stone  N20.0 592.0    7. Chronic left-sided low back pain with bilateral sciatica  M54.41 724.2     M54.42 724.3     G89.29 338.29    8. Osteopenia, unspecified location  M85.80 733.90    9. AVTAR (obstructive sleep apnea)  G47.33 327.23    10.  Smoker  F17.200 305.1      · Thyroid Tenderness: New problem, evaluate further as above. · Osteopenia: Chronic, unclear control. Obtain DEXA  · HTN: Blood pressure not recorded during encounter. Will bring patient back for nurse BP check to document BP.   · AVTAR: Chronic, uncontrolled. Advised patient to schedule visit with sleep medicine as previously discussed. Patient expresses understanding. · Nicotine Dependence: Chronic, uncontrolled. Discussed recommendation for compelete cessation.  All other conditions listed above: chronic and stable. Will continue current treatment regimen. Will check labs as reflected above. Discussed following up with specialist as scheduled. Discussed recommendations for diet and exercise. I have discussed the diagnosis with the patient and the intended plan as seen in the above orders. The patient has received an after-visit summary along with patient information handout. I have discussed medication side effects and warnings with the patient as well. Disposition  Follow-up and Dispositions    · Return in about 6 months (around 5/17/2022).            Chan Last MD

## 2021-11-17 NOTE — PROGRESS NOTES
Chief Complaint   Patient presents with    Follow-up        1. \"Have you been to the ER, urgent care clinic since your last visit? Hospitalized since your last visit? \" Yes Reason for visit: Head Injury    2. \"Have you seen or consulted any other health care providers outside of the [address] [place] [address] since your last visit? \" No     3. For patients aged 39-70: Has the patient had a colonoscopy? No     If the patient is female:    4. For patients aged 41-77: Has the patient had a mammogram within the past 2 years? Yes, HM satisfied with blue hyperlink    5. For patients aged 21-30: Has the patient had a pap smear?  Yes, HM satisfied with blue hyperlink    3 most recent PHQ Screens 6/21/2021   Little interest or pleasure in doing things Not at all   Feeling down, depressed, irritable, or hopeless Not at all   Total Score PHQ 2 0

## 2021-11-18 LAB
ALBUMIN SERPL-MCNC: 4.4 G/DL (ref 3.8–4.8)
ALBUMIN/GLOB SERPL: 1.8 {RATIO} (ref 1.2–2.2)
ALP SERPL-CCNC: 97 IU/L (ref 44–121)
ALT SERPL-CCNC: 10 IU/L (ref 0–32)
APPEARANCE UR: ABNORMAL
AST SERPL-CCNC: 13 IU/L (ref 0–40)
BACTERIA #/AREA URNS HPF: ABNORMAL /[HPF]
BILIRUB SERPL-MCNC: 0.4 MG/DL (ref 0–1.2)
BILIRUB UR QL STRIP: NEGATIVE
BUN SERPL-MCNC: 14 MG/DL (ref 8–27)
BUN/CREAT SERPL: 20 (ref 12–28)
CALCIUM SERPL-MCNC: 10.3 MG/DL (ref 8.7–10.3)
CASTS URNS QL MICRO: ABNORMAL /LPF
CHLORIDE SERPL-SCNC: 102 MMOL/L (ref 96–106)
CHOLEST SERPL-MCNC: 164 MG/DL (ref 100–199)
CO2 SERPL-SCNC: 23 MMOL/L (ref 20–29)
COLOR UR: YELLOW
CREAT SERPL-MCNC: 0.7 MG/DL (ref 0.57–1)
EPI CELLS #/AREA URNS HPF: ABNORMAL /HPF (ref 0–10)
ERYTHROCYTE [DISTWIDTH] IN BLOOD BY AUTOMATED COUNT: 12.6 % (ref 11.7–15.4)
GLOBULIN SER CALC-MCNC: 2.4 G/DL (ref 1.5–4.5)
GLUCOSE SERPL-MCNC: 104 MG/DL (ref 65–99)
GLUCOSE UR QL: NEGATIVE
HCT VFR BLD AUTO: 44.8 % (ref 34–46.6)
HDLC SERPL-MCNC: 43 MG/DL
HGB BLD-MCNC: 15.2 G/DL (ref 11.1–15.9)
HGB UR QL STRIP: NEGATIVE
IMP & REVIEW OF LAB RESULTS: NORMAL
KETONES UR QL STRIP: NEGATIVE
LDLC SERPL CALC-MCNC: 94 MG/DL (ref 0–99)
LEUKOCYTE ESTERASE UR QL STRIP: ABNORMAL
MCH RBC QN AUTO: 30.8 PG (ref 26.6–33)
MCHC RBC AUTO-ENTMCNC: 33.9 G/DL (ref 31.5–35.7)
MCV RBC AUTO: 91 FL (ref 79–97)
MICRO URNS: ABNORMAL
NITRITE UR QL STRIP: NEGATIVE
PH UR STRIP: 6 [PH] (ref 5–7.5)
PLATELET # BLD AUTO: 302 X10E3/UL (ref 150–450)
POTASSIUM SERPL-SCNC: 4.1 MMOL/L (ref 3.5–5.2)
PROT SERPL-MCNC: 6.8 G/DL (ref 6–8.5)
PROT UR QL STRIP: NEGATIVE
RBC # BLD AUTO: 4.93 X10E6/UL (ref 3.77–5.28)
RBC #/AREA URNS HPF: ABNORMAL /HPF (ref 0–2)
SODIUM SERPL-SCNC: 140 MMOL/L (ref 134–144)
SP GR UR: 1.01 (ref 1–1.03)
THYROPEROXIDASE AB SERPL-ACNC: <8 IU/ML (ref 0–34)
TRIGL SERPL-MCNC: 157 MG/DL (ref 0–149)
TSH RECEP AB SER-ACNC: <1.1 IU/L (ref 0–1.75)
TSH SERPL DL<=0.005 MIU/L-ACNC: 1.17 UIU/ML (ref 0.45–4.5)
UROBILINOGEN UR STRIP-MCNC: 0.2 MG/DL (ref 0.2–1)
VLDLC SERPL CALC-MCNC: 27 MG/DL (ref 5–40)
WBC # BLD AUTO: 8.9 X10E3/UL (ref 3.4–10.8)
WBC #/AREA URNS HPF: ABNORMAL /HPF (ref 0–5)

## 2021-11-23 ENCOUNTER — TELEPHONE (OUTPATIENT)
Dept: FAMILY MEDICINE CLINIC | Age: 66
End: 2021-11-23

## 2021-11-23 PROBLEM — E78.5 DYSLIPIDEMIA: Status: ACTIVE | Noted: 2021-11-23

## 2021-11-23 PROBLEM — F17.200 SMOKER: Status: ACTIVE | Noted: 2021-11-23

## 2021-11-23 PROBLEM — M54.42 CHRONIC LEFT-SIDED LOW BACK PAIN WITH BILATERAL SCIATICA: Status: ACTIVE | Noted: 2021-11-23

## 2021-11-23 PROBLEM — N20.0 KIDNEY STONE: Status: ACTIVE | Noted: 2021-11-23

## 2021-11-23 PROBLEM — M54.41 CHRONIC LEFT-SIDED LOW BACK PAIN WITH BILATERAL SCIATICA: Status: ACTIVE | Noted: 2021-11-23

## 2021-11-23 PROBLEM — G89.29 CHRONIC LEFT-SIDED LOW BACK PAIN WITH BILATERAL SCIATICA: Status: ACTIVE | Noted: 2021-11-23

## 2021-11-23 PROBLEM — R51.9 TENDERNESS OF HEAD AND NECK: Status: ACTIVE | Noted: 2021-11-23

## 2021-11-23 PROBLEM — M54.2 TENDERNESS OF HEAD AND NECK: Status: ACTIVE | Noted: 2021-11-23

## 2021-11-24 NOTE — TELEPHONE ENCOUNTER
The patient's blood pressure was not entered into vital signs at the last visit. Please call her to come in for nurse blood pressure check, so that we can confirm that it has improved.      Deidre Ulrich MD

## 2021-11-30 ENCOUNTER — PATIENT OUTREACH (OUTPATIENT)
Dept: CASE MANAGEMENT | Age: 66
End: 2021-11-30

## 2021-12-03 ENCOUNTER — PATIENT OUTREACH (OUTPATIENT)
Dept: CASE MANAGEMENT | Age: 66
End: 2021-12-03

## 2021-12-03 NOTE — PROGRESS NOTES
Ambulatory Care Management Note    Date/Time:  12/3/2021 8:38 AM    This Ambulatory Care Manager (ACM) reviewed and updated the following screenings during this call; general assessment, disease specific assessment  and self management assessment    Patient's challenges to self-management identified:   none identified at this time      Medication Management:  good adherence and good understanding    Advance Care Planning:   Does patient have an Advance Directive:  deferred to later [employer], Referrals, and Durable Medical Equipment: none    Health Maintenance Due   Topic Date Due    COVID-19 Vaccine (1) Never done    DTaP/Tdap/Td series (1 - Tdap) Never done    Shingrix Vaccine Age 50> (1 of 2) Never done    Pneumococcal 65+ years (2 of 2 - PPSV23) 03/04/2021     Health Maintenance reviewed - patient states she has received both doses of Nancie Marus as well as booster. 1st dose in January 2021, 2nd dose February 2021 and booster November 2021. Patient was asked to consider health care goals that they would like to focus on with this ACM. ACM will follow up with patient to discuss goals and establish care plan in the next 7-14 days. PCP/Specialist follow up: reviewed upcoming thyroid/dexa   Future Appointments   Date Time Provider Margi Fountain   12/7/2021  8:00 AM [address] [facility]   12/7/2021  8:30 AM 1 [facility]   2/16/2022 10:45 AM REMOTE1, PARISH MAY BS AMB   5/25/2022  8:30 AM REMOTE1, PARISH MAY BS AMB   8/4/2022  1:20 PM MD YADIRA Hopkins BS AMB   8/23/2022  9:00 AM PACEMAKER3, PARISH MAY BS AMB   8/23/2022  9:20 AM MD YADIRA Hopkins BS AMB      B/P- patient states she checks B/P at home daily. Educated patient on waiting at least 1 hour after taking B/P medication to obtain B/P. Education on proper technic completed. Patient verbalized understanding.  Patient will now obtain B/P at least one hour after taking B/P meds. Last B/P readings are Checking B/P daily. 12/2//93 12/3/21- 123/77       URI-Patient states she has not been feeling well since Thanksgiving. Patient states Neg-home Covid test Sunday, 11/29/21. Notes productive cough. Mucus white in color. Denies yellow/green discharge. Patient denies fever. Notes periods of wheezing. States she is \"feeling better today\" patient currently taking vitamins. No OTC meds at this time. Patient states she did take 1 dose Dayquil however did not take further as she felt it was affecting her B/P. Patient is requesting prednisone taper pack. Pharmacy on file confirmed. [id number] Per Dr. Moiz Thomas patient advised to go to urgent care for evaluation. Patient verbalized understanding. Patient states if she is not feeling better by Monday she will contact office to schedule office visit. Goals Addressed                 This Visit's Progress     Knowledge and adherence of prescribed medication (ie. action, side effects, missed dose, etc.).        10/15/21  Last PCP O/V 6/21/21. Patient noted with elevated cholesterol. PCP made medication adjustments. Patient to follow up with PCP in 3 mos. Reviewed labs with patient. Confirmed medication regimen with patient. Patient to schedule follow up visit with PCP to include fasting labs prior to next [facility] outreach. PM     10/15/21  Cardiology: Per 10/1/21 message, patient noted with few, brief episodes of a faster heart beat. Provider to make medication adjustment to Diltiazem  mg daily. Reviewed purpose of medication with patient. Patient has not started new medication. Patient will contact cardiologist today to confirm new script sent to pharmacy. [facility] to follow up with patient. PM      12/03/21  Patient states she is currently taking medications for B/P in the morning. Cholesterol med at night. Patient states she is taking B/P in the morning daily. Educated patient on proper techniques for obtaining B/P.  Also instructed patient to obtain B/P 1-2 hrs after taking morning meds. Patient verbalized understanding. ACM to follow up with patient in 10-14  days. PM

## 2021-12-12 ENCOUNTER — OFFICE VISIT (OUTPATIENT)
Dept: URGENT CARE | Age: 66
End: 2021-12-12
Payer: MEDICARE

## 2021-12-12 VITALS — HEART RATE: 97 BPM | OXYGEN SATURATION: 99 % | TEMPERATURE: 98.2 F | RESPIRATION RATE: 16 BRPM

## 2021-12-12 DIAGNOSIS — N39.0 URINARY TRACT INFECTION WITHOUT HEMATURIA, SITE UNSPECIFIED: Primary | ICD-10-CM

## 2021-12-12 DIAGNOSIS — R05.8 POST-VIRAL COUGH SYNDROME: ICD-10-CM

## 2021-12-12 LAB
BILIRUB UR QL STRIP: NEGATIVE
GLUCOSE UR-MCNC: NEGATIVE MG/DL
KETONES P FAST UR STRIP-MCNC: NEGATIVE MG/DL
PH UR STRIP: 6 [PH] (ref 4.6–8)
PROT UR QL STRIP: NEGATIVE
SP GR UR STRIP: 1.02 (ref 1–1.03)
UA UROBILINOGEN AMB POC: NORMAL (ref 0.2–1)
URINALYSIS CLARITY POC: NORMAL
URINALYSIS COLOR POC: NORMAL
URINE BLOOD POC: NEGATIVE
URINE LEUKOCYTES POC: NEGATIVE
URINE NITRITES POC: POSITIVE

## 2021-12-12 PROCEDURE — G8399 PT W/DXA RESULTS DOCUMENT: HCPCS | Performed by: NURSE PRACTITIONER

## 2021-12-12 PROCEDURE — G8432 DEP SCR NOT DOC, RNG: HCPCS | Performed by: NURSE PRACTITIONER

## 2021-12-12 PROCEDURE — G9899 SCRN MAM PERF RSLTS DOC: HCPCS | Performed by: NURSE PRACTITIONER

## 2021-12-12 PROCEDURE — 1090F PRES/ABSN URINE INCON ASSESS: CPT | Performed by: NURSE PRACTITIONER

## 2021-12-12 PROCEDURE — G8756 NO BP MEASURE DOC: HCPCS | Performed by: NURSE PRACTITIONER

## 2021-12-12 PROCEDURE — 81002 URINALYSIS NONAUTO W/O SCOPE: CPT | Performed by: NURSE PRACTITIONER

## 2021-12-12 PROCEDURE — 1101F PT FALLS ASSESS-DOCD LE1/YR: CPT | Performed by: NURSE PRACTITIONER

## 2021-12-12 PROCEDURE — 99204 OFFICE O/P NEW MOD 45 MIN: CPT | Performed by: NURSE PRACTITIONER

## 2021-12-12 PROCEDURE — G8427 DOCREV CUR MEDS BY ELIG CLIN: HCPCS | Performed by: NURSE PRACTITIONER

## 2021-12-12 PROCEDURE — G8417 CALC BMI ABV UP PARAM F/U: HCPCS | Performed by: NURSE PRACTITIONER

## 2021-12-12 PROCEDURE — G8536 NO DOC ELDER MAL SCRN: HCPCS | Performed by: NURSE PRACTITIONER

## 2021-12-12 PROCEDURE — 3017F COLORECTAL CA SCREEN DOC REV: CPT | Performed by: NURSE PRACTITIONER

## 2021-12-12 RX ORDER — AMOXICILLIN AND CLAVULANATE POTASSIUM 875; 125 MG/1; MG/1
1 TABLET, FILM COATED ORAL EVERY 12 HOURS
Qty: 20 TABLET | Refills: 0 | Status: SHIPPED | OUTPATIENT
Start: 2021-12-12 | End: 2021-12-22

## 2021-12-12 RX ORDER — BENZONATATE 200 MG/1
200 CAPSULE ORAL
Qty: 21 CAPSULE | Refills: 0 | Status: SHIPPED | OUTPATIENT
Start: 2021-12-12 | End: 2021-12-19

## 2021-12-12 NOTE — PATIENT INSTRUCTIONS
Follow up with your primary care provider this week. Go to the Emergency Department with development of any acute symptoms. START: Amoxicillin-Clavulanate twice daily for 10 days. Get a probiotic from the pharmacy, as this medication may give you diarrhea. Benzonatate up to three times daily as needed for cough         Urinary Tract Infection (UTI) in Women: Care Instructions  Overview     A urinary tract infection, or UTI, is a general term for an infection anywhere between the kidneys and the urethra (where urine comes out). Most UTIs are bladder infections. They often cause pain or burning when you urinate. UTIs are caused by bacteria and can be cured with antibiotics. Be sure to complete your treatment so that the infection does not get worse. Follow-up care is a key part of your treatment and safety. Be sure to make and go to all appointments, and call your doctor if you are having problems. It's also a good idea to know your test results and keep a list of the medicines you take. How can you care for yourself at home? · Take your antibiotics as directed. Do not stop taking them just because you feel better. You need to take the full course of antibiotics. · Drink extra water and other fluids for the next day or two. This will help make the urine less concentrated and help wash out the bacteria that are causing the infection. (If you have kidney, heart, or liver disease and have to limit fluids, talk with your doctor before you increase the amount of fluids you drink.)  · Avoid drinks that are carbonated or have caffeine. They can irritate the bladder. · Urinate often. Try to empty your bladder each time. · To relieve pain, take a hot bath or lay a heating pad set on low over your lower belly or genital area. Never go to sleep with a heating pad in place. To prevent UTIs  · Drink plenty of water each day. This helps you urinate often, which clears bacteria from your system.  (If you have kidney, heart, or liver disease and have to limit fluids, talk with your doctor before you increase the amount of fluids you drink.)  · Urinate when you need to. · If you are sexually active, urinate right after you have sex. · Change sanitary pads often. · Avoid douches, bubble baths, feminine hygiene sprays, and other feminine hygiene products that have deodorants. · After going to the bathroom, wipe from front to back. When should you call for help? Call your doctor now or seek immediate medical care if:    · Symptoms such as fever, chills, nausea, or vomiting get worse or appear for the first time.     · You have new pain in your back just below your rib cage. This is called flank pain.     · There is new blood or pus in your urine.     · You have any problems with your antibiotic medicine. Watch closely for changes in your health, and be sure to contact your doctor if:    · You are not getting better after taking an antibiotic for 2 days.     · Your symptoms go away but then come back. Where can you learn more? Go to http://www.gray.com/  Enter Y433 in the search box to learn more about \"Urinary Tract Infection (UTI) in Women: Care Instructions. \"  Current as of: February 10, 2021               Content Version: 13.0  © 2006-2021 [employer]. Care instructions adapted under license by [employer] (which disclaims liability or warranty for this information). If you have questions about a medical condition or this instruction, always ask your healthcare professional. [place] [address] any warranty or liability for your use of this information.

## 2021-12-12 NOTE — PROGRESS NOTES
Tommy Finch is a 74 y.o. female presenting to clinic today with dysuria, urinary frequency, urinary urgency, and cloudy urine that began yesterday. States that she has a history of kidney infections and had kidney stones a few months ago. Denies fevers, chills, nausea, or vomiting, but does endorse flank pain and abdominal pain. Denies vaginal discharge, bleeding, or pelvic pain. Also requesting cough medication for a cough that has been lingering after a viral URI. Reports that she tested negative for COVID. No other complaints today. The history is provided by the patient. History limited by: nothing. Urinary Pain  Pertinent negatives include no chest pain, no abdominal pain and no shortness of breath.         Past Medical History:   Diagnosis Date    Arrhythmia     sick sinus syndrome-has demand pacemaker    Arthritis     osteoarthritis-left foot and knee    High cholesterol     Hypertension     Ill-defined condition 2015    frontal lobe bleed    Menopause     Musculoskeletal disorder         Past Surgical History:   Procedure Laterality Date    COLONOSCOPY N/A 8/4/2020    COLONOSCOPY performed by Cece Cheatham [facility] at [facility] HX ABDOMINAL LAPAROSCOPY      HX APPENDECTOMY      HX TONSILLECTOMY       [facility]    [place] INS NEW/RPLCMT PRM PM W/TRANSV ELTRD ATRIAL&VENT N/A 7/26/2019    INSERT PPM DUAL performed by Ángel Fragoso MD at [address] [facility]         Family History   Problem Relation Age of Onset    Heart Disease Mother     Hypertension Mother     Diabetes Mother     Heart Disease Father     Diabetes Father     Hypertension Father         Social History     Socioeconomic History    Marital status:      Spouse name: Not on file    Number of children: Not on file    Years of education: Not on file    Highest education level: Not on file   Occupational History    Not on file   Tobacco Use    Smoking status: Current Some Day Smoker     Packs/day: 0.25 Years: 42.00     Pack years: 10.50     Types: Cigarettes    Smokeless tobacco: Never Used   Substance and Sexual Activity    Alcohol use: Not Currently    Drug use: No    Sexual activity: Never   Other Topics Concern    Not on file   Social History Narrative    Not on file     Social Determinants of Health     Financial Resource Strain:     Difficulty of Paying Living Expenses: Not on file   Food Insecurity:     Worried About Running Out of Food in the Last Year: Not on file    Lilli of Food in the Last Year: Not on file   Transportation Needs:     Lack of Transportation (Medical): Not on file    Lack of Transportation (Non-Medical): Not on file   Physical Activity:     Days of Exercise per Week: Not on file    Minutes of Exercise per Session: Not on file   Stress:     Feeling of Stress : Not on file   Social Connections:     Frequency of Communication with Friends and Family: Not on file    Frequency of Social Gatherings with Friends and Family: Not on file    Attends Confucianist Services: Not on file    Active Member of [address] [place] [address] or Organizations: Not on file    Attends Club or Organization Meetings: Not on file    Marital Status: Not on file   Intimate Partner Violence:     Fear of Current or Ex-Partner: Not on file    Emotionally Abused: Not on file    Physically Abused: Not on file    Sexually Abused: Not on file   Housing Stability:     Unable to Pay for Housing in the Last Year: Not on file    Number of [place] in the Last Year: Not on file    Unstable Housing in the Last Year: Not on file                ALLERGIES: Gluten and Chloromycetin [chloramphenicol sod succinate]    Review of Systems   Constitutional: Negative for chills and fever. HENT: Negative for congestion, rhinorrhea, sneezing and sore throat. Respiratory: Negative for cough, chest tightness, shortness of breath and wheezing. Cardiovascular: Negative for chest pain.    Gastrointestinal: Negative for abdominal pain, nausea and vomiting. Genitourinary: Positive for dysuria, flank pain, frequency and urgency. Negative for vaginal bleeding, vaginal discharge and vaginal pain. Vitals:    12/12/21 1619   Pulse: 97   Resp: 16   Temp: 98.2 °F (36.8 °C)   SpO2: 99%       Physical Exam  Vitals and nursing note reviewed. Constitutional:       General: She is not in acute distress. Appearance: Normal appearance. She is not ill-appearing, toxic-appearing or diaphoretic. HENT:      Head: Normocephalic and atraumatic. Eyes:      Extraocular Movements: Extraocular movements intact. Conjunctiva/sclera: Conjunctivae normal.   Cardiovascular:      Rate and Rhythm: Normal rate. Pulmonary:      Effort: Pulmonary effort is normal. No respiratory distress. Breath sounds: Normal breath sounds. Comments: Speaking in complete sentences without difficulty. Spastic cough on exam  Abdominal:      General: There is no distension. Palpations: Abdomen is soft. Tenderness: There is no abdominal tenderness. Musculoskeletal:      Comments: + left CVA tenderness   Skin:     General: Skin is warm and dry. Neurological:      General: No focal deficit present. Mental Status: She is alert.    Psychiatric:         Mood and Affect: Mood normal.         Behavior: Behavior normal.         MDM   Results for orders placed or performed in visit on 12/12/21   AMB POC URINALYSIS DIP STICK MANUAL W/O MICRO   Result Value Ref Range    Color (UA POC)      Clarity (UA POC)      Glucose (UA POC) Negative Negative    Bilirubin (UA POC) Negative Negative    Ketones (UA POC) Negative Negative    Specific gravity (UA POC) 1.020 1.001 - 1.035    Blood (UA POC) Negative Negative    pH (UA POC) 6 4.6 - 8.0    Protein (UA POC) Negative Negative    Urobilinogen (UA POC) 0.2 mg/dL 0.2 - 1    Nitrites (UA POC) Positive Negative    Leukocyte esterase (UA POC) Negative Negative       PLAN:  Patient presents to clinic today with urinary complaints and post-viral cough. 1. Urinalysis with +nitrites. Had CVA tenderness on exam and history of kidney infections, so will rx augmentin. Urine culture sent. 2. Rx benzonatate as needed for cough per patient request.  3. Continue AZO as needed for dysuria. Increase fluid intake, ensure adequate nutritional intake. 4. Follow up with PCP as needed. 5. Go to ED with development of any acute symptoms. DIAGNOSES:    ICD-10-CM ICD-9-CM    1. Urinary tract infection without hematuria, site unspecified  N39.0 599.0 AMB POC URINALYSIS DIP STICK MANUAL W/O MICRO      CULTURE, URINE      CULTURE, URINE   2. Post-viral cough syndrome  R05.8 786.2      Medications Ordered Today   Medications    benzonatate (TESSALON) 200 mg capsule     Sig: Take 1 Capsule by mouth three (3) times daily as needed for Cough for up to 7 days. Dispense:  21 Capsule     Refill:  0    amoxicillin-clavulanate (AUGMENTIN) 875-125 mg per tablet     Sig: Take 1 Tablet by mouth every twelve (12) hours for 10 days.      Dispense:  20 Tablet     Refill:  0     Procedures

## 2021-12-21 LAB — BACTERIA UR CULT: ABNORMAL

## 2021-12-29 ENCOUNTER — HOSPITAL ENCOUNTER (OUTPATIENT)
Dept: ULTRASOUND IMAGING | Age: 66
Discharge: HOME | End: 2021-12-29
Attending: FAMILY MEDICINE
Payer: MEDICARE

## 2021-12-29 ENCOUNTER — HOSPITAL ENCOUNTER (OUTPATIENT)
Dept: MAMMOGRAPHY | Age: 66
Discharge: HOME | End: 2021-12-29
Attending: FAMILY MEDICINE
Payer: MEDICARE

## 2021-12-29 DIAGNOSIS — M54.2 TENDERNESS OF HEAD AND NECK: ICD-10-CM

## 2021-12-29 DIAGNOSIS — R51.9 TENDERNESS OF HEAD AND NECK: ICD-10-CM

## 2021-12-29 DIAGNOSIS — Z78.0 POST-MENOPAUSAL: ICD-10-CM

## 2021-12-29 PROCEDURE — 76536 US EXAM OF HEAD AND NECK: CPT

## 2021-12-29 PROCEDURE — 77080 DXA BONE DENSITY AXIAL: CPT

## 2021-12-29 NOTE — PROGRESS NOTES
Dear Laurence Forman did not identify any concerning findings. If you have any questions, please feel free to call our office at [phone].      Opal Sin MD

## 2022-01-27 ENCOUNTER — PATIENT MESSAGE (OUTPATIENT)
Dept: FAMILY MEDICINE CLINIC | Age: 67
End: 2022-01-27

## 2022-01-27 RX ORDER — LISINOPRIL AND HYDROCHLOROTHIAZIDE 20; 25 MG/1; MG/1
1 TABLET ORAL DAILY
Qty: 90 TABLET | Refills: 1 | Status: SHIPPED | OUTPATIENT
Start: 2022-01-27 | End: 2022-08-22 | Stop reason: SDUPTHER

## 2022-01-27 NOTE — TELEPHONE ENCOUNTER
PCP: Oksana Bronson MD    Last appt: 11/17/2021  Future Appointments   Date Time Provider Margi Fountain   2/16/2022 10:45 AM REMOTE1, [place] [facility]   5/25/2022  8:30 AM REMOTE1, [place] [facility]   8/4/2022  1:20 PM MD YADIRA Conner AMB   8/23/2022  9:00 AM PACEMAKER3, [place] [facility]   8/23/2022  9:20 AM MD YADIRA Conner BS AMB       Requested Prescriptions     Pending Prescriptions Disp Refills    lisinopril-hydroCHLOROthiazide (PRINZIDE, ZESTORETIC) 20-25 mg per tablet 90 Tablet 1     Sig: Take 1 Tablet by mouth daily.        Prior labs and Blood pressures:  BP Readings from Last 3 Encounters:   10/14/21 (!) 168/91   08/16/21 126/76   06/21/21 137/73     Lab Results   Component Value Date/Time    Sodium 140 11/17/2021 09:08 AM    Potassium 4.1 11/17/2021 09:08 AM    Chloride 102 11/17/2021 09:08 AM    CO2 23 11/17/2021 09:08 AM    Anion gap 5 02/16/2021 09:32 AM    Glucose 104 (H) 11/17/2021 09:08 AM    BUN 14 11/17/2021 09:08 AM    Creatinine 0.70 11/17/2021 09:08 AM    BUN/Creatinine ratio 20 11/17/2021 09:08 AM    GFR est  11/17/2021 09:08 AM    GFR est non-AA 89 11/17/2021 09:08 AM    Calcium 10.3 11/17/2021 09:08 AM     Lab Results   Component Value Date/Time    Hemoglobin A1c 5.3 06/21/2021 10:40 AM     Lab Results   Component Value Date/Time    Cholesterol, total 164 11/17/2021 09:08 AM    HDL Cholesterol 43 11/17/2021 09:08 AM    LDL, calculated 94 11/17/2021 09:08 AM    LDL, calculated 111 (H) 03/01/2019 08:53 AM    VLDL, calculated 27 11/17/2021 09:08 AM    VLDL, calculated 32 03/01/2019 08:53 AM    Triglyceride 157 (H) 11/17/2021 09:08 AM     Lab Results   Component Value Date/Time    VITAMIN D, 25-HYDROXY 20.4 (L) 03/01/2019 08:53 AM       Lab Results   Component Value Date/Time    TSH 1.170 11/17/2021 09:08 AM 7

## 2022-01-27 NOTE — TELEPHONE ENCOUNTER
Patient call for refill of lisinopril. Ashley Friday has sent requests. None found. Already pending per [facility] message today.   Thanks, Thanh Posey

## 2022-02-16 ENCOUNTER — OFFICE VISIT (OUTPATIENT)
Dept: CARDIOLOGY CLINIC | Age: 67
End: 2022-02-16
Payer: MEDICARE

## 2022-02-16 DIAGNOSIS — Z95.0 CARDIAC PACEMAKER IN SITU: Primary | ICD-10-CM

## 2022-02-16 PROCEDURE — 93294 REM INTERROG EVL PM/LDLS PM: CPT | Performed by: INTERNAL MEDICINE

## 2022-02-16 PROCEDURE — 93296 REM INTERROG EVL PM/IDS: CPT | Performed by: INTERNAL MEDICINE

## 2022-02-16 NOTE — LETTER
2/16/2022 9:14 AM    Ms. Julien Freedman  [address] [place] [phone]          This letter confirms that we have received your scheduled remote check of your implanted     device on 2-16-22  . Our EP team will contact you via phone if there are significant abnormal    findings. Your next remote check from home is scheduled for 5-25-22  . If you have any questions, please call [address] [place] [address] at [phone].                Sincerely,    Candy Johnson MD [facility]

## 2022-03-18 PROBLEM — R51.9 TENDERNESS OF HEAD AND NECK: Status: ACTIVE | Noted: 2021-11-23

## 2022-03-18 PROBLEM — K64.9 HEMORRHOIDS: Status: ACTIVE | Noted: 2020-03-08

## 2022-03-18 PROBLEM — F17.200 SMOKER: Status: ACTIVE | Noted: 2021-11-23

## 2022-03-18 PROBLEM — M54.2 TENDERNESS OF HEAD AND NECK: Status: ACTIVE | Noted: 2021-11-23

## 2022-03-18 PROBLEM — E66.01 OBESITY, MORBID (HCC): Status: ACTIVE | Noted: 2018-02-07

## 2022-03-18 PROBLEM — Z87.81 HX OF COMPRESSION FRACTURE OF SPINE: Status: ACTIVE | Noted: 2021-06-27

## 2022-03-18 PROBLEM — N20.0 KIDNEY STONE: Status: ACTIVE | Noted: 2021-11-23

## 2022-03-19 PROBLEM — R22.9 SUBCUTANEOUS MASS: Status: ACTIVE | Noted: 2018-02-28

## 2022-03-19 PROBLEM — Z72.0 TOBACCO USE: Status: ACTIVE | Noted: 2020-03-04

## 2022-03-19 PROBLEM — I49.5 SICK SINUS SYNDROME (HCC): Status: ACTIVE | Noted: 2019-07-26

## 2022-03-19 PROBLEM — M54.41 CHRONIC LEFT-SIDED LOW BACK PAIN WITH BILATERAL SCIATICA: Status: ACTIVE | Noted: 2021-11-23

## 2022-03-19 PROBLEM — M54.42 CHRONIC LEFT-SIDED LOW BACK PAIN WITH BILATERAL SCIATICA: Status: ACTIVE | Noted: 2021-11-23

## 2022-03-19 PROBLEM — G89.29 CHRONIC LEFT-SIDED LOW BACK PAIN WITH BILATERAL SCIATICA: Status: ACTIVE | Noted: 2021-11-23

## 2022-03-19 PROBLEM — R73.01 IMPAIRED FASTING GLUCOSE: Status: ACTIVE | Noted: 2021-06-27

## 2022-03-20 PROBLEM — Z87.892 HX OF ANAPHYLAXIS: Status: ACTIVE | Noted: 2020-03-04

## 2022-03-20 PROBLEM — Z95.0 PACEMAKER: Status: ACTIVE | Noted: 2019-07-26

## 2022-03-20 PROBLEM — M85.89 OSTEOPENIA OF MULTIPLE SITES: Status: ACTIVE | Noted: 2021-06-27

## 2022-03-20 PROBLEM — G47.33 OSA (OBSTRUCTIVE SLEEP APNEA): Status: ACTIVE | Noted: 2020-03-08

## 2022-03-20 PROBLEM — Z80.8 FAMILY HISTORY OF MELANOMA: Status: ACTIVE | Noted: 2020-03-04

## 2022-04-26 RX ORDER — DILTIAZEM HYDROCHLORIDE 120 MG/1
120 CAPSULE, COATED, EXTENDED RELEASE ORAL DAILY
Qty: 90 CAPSULE | Refills: 1 | Status: SHIPPED | OUTPATIENT
Start: 2022-04-26

## 2022-05-25 ENCOUNTER — OFFICE VISIT (OUTPATIENT)
Dept: CARDIOLOGY CLINIC | Age: 67
End: 2022-05-25
Payer: MEDICARE

## 2022-05-25 DIAGNOSIS — Z95.0 CARDIAC PACEMAKER IN SITU: Primary | ICD-10-CM

## 2022-05-25 PROCEDURE — 93294 REM INTERROG EVL PM/LDLS PM: CPT | Performed by: INTERNAL MEDICINE

## 2022-05-25 PROCEDURE — 93296 REM INTERROG EVL PM/IDS: CPT | Performed by: INTERNAL MEDICINE

## 2022-05-25 NOTE — LETTER
5/25/2022 10:40 AM    Ms. Fidel Vyas  [address] [place] [phone]            This letter confirms that we have received your scheduled remote check of your implanted     device on 5-25-22  . Our EP team will contact you via phone if there are significant abnormal    findings. Your next in-clinic device check is scheduled for 8-23-22 at 9:00am  .                   If you have any questions, please call [address] [place] [address] at [phone].                Sincerely,    Mohini Rivero MD [facility]

## 2022-06-29 ENCOUNTER — OFFICE VISIT (OUTPATIENT)
Dept: FAMILY MEDICINE CLINIC | Age: 67
End: 2022-06-29
Payer: MEDICARE

## 2022-06-29 VITALS
HEART RATE: 64 BPM | TEMPERATURE: 97.9 F | OXYGEN SATURATION: 98 % | SYSTOLIC BLOOD PRESSURE: 128 MMHG | BODY MASS INDEX: 36.7 KG/M2 | WEIGHT: 215 LBS | HEIGHT: 64 IN | RESPIRATION RATE: 16 BRPM | DIASTOLIC BLOOD PRESSURE: 80 MMHG

## 2022-06-29 DIAGNOSIS — M54.41 CHRONIC LEFT-SIDED LOW BACK PAIN WITH BILATERAL SCIATICA: ICD-10-CM

## 2022-06-29 DIAGNOSIS — Z87.81 HX OF COMPRESSION FRACTURE OF SPINE: ICD-10-CM

## 2022-06-29 DIAGNOSIS — E78.2 HYPERLIPEMIA, MIXED: ICD-10-CM

## 2022-06-29 DIAGNOSIS — Z00.00 MEDICARE ANNUAL WELLNESS VISIT, SUBSEQUENT: Primary | ICD-10-CM

## 2022-06-29 DIAGNOSIS — M85.80 OSTEOPENIA, UNSPECIFIED LOCATION: ICD-10-CM

## 2022-06-29 DIAGNOSIS — E78.5 HYPERLIPIDEMIA, UNSPECIFIED HYPERLIPIDEMIA TYPE: ICD-10-CM

## 2022-06-29 DIAGNOSIS — I10 ESSENTIAL HYPERTENSION: ICD-10-CM

## 2022-06-29 DIAGNOSIS — F17.211 CIGARETTE NICOTINE DEPENDENCE IN REMISSION: ICD-10-CM

## 2022-06-29 DIAGNOSIS — M85.89 OSTEOPENIA OF MULTIPLE SITES: ICD-10-CM

## 2022-06-29 DIAGNOSIS — I49.5 SICK SINUS SYNDROME (HCC): ICD-10-CM

## 2022-06-29 DIAGNOSIS — E66.01 SEVERE OBESITY (BMI 35.0-39.9) WITH COMORBIDITY (HCC): ICD-10-CM

## 2022-06-29 DIAGNOSIS — G47.33 OSA (OBSTRUCTIVE SLEEP APNEA): ICD-10-CM

## 2022-06-29 DIAGNOSIS — E55.9 VITAMIN D DEFICIENCY: ICD-10-CM

## 2022-06-29 DIAGNOSIS — R73.09 ELEVATED GLUCOSE: ICD-10-CM

## 2022-06-29 DIAGNOSIS — M54.42 CHRONIC LEFT-SIDED LOW BACK PAIN WITH BILATERAL SCIATICA: ICD-10-CM

## 2022-06-29 DIAGNOSIS — R91.1 LUNG NODULE: ICD-10-CM

## 2022-06-29 DIAGNOSIS — G89.29 CHRONIC LEFT-SIDED LOW BACK PAIN WITH BILATERAL SCIATICA: ICD-10-CM

## 2022-06-29 DIAGNOSIS — E66.01 OBESITY, MORBID (HCC): ICD-10-CM

## 2022-06-29 PROCEDURE — G8752 SYS BP LESS 140: HCPCS | Performed by: FAMILY MEDICINE

## 2022-06-29 PROCEDURE — 3017F COLORECTAL CA SCREEN DOC REV: CPT | Performed by: FAMILY MEDICINE

## 2022-06-29 PROCEDURE — G9899 SCRN MAM PERF RSLTS DOC: HCPCS | Performed by: FAMILY MEDICINE

## 2022-06-29 PROCEDURE — G8536 NO DOC ELDER MAL SCRN: HCPCS | Performed by: FAMILY MEDICINE

## 2022-06-29 PROCEDURE — G8427 DOCREV CUR MEDS BY ELIG CLIN: HCPCS | Performed by: FAMILY MEDICINE

## 2022-06-29 PROCEDURE — G8754 DIAS BP LESS 90: HCPCS | Performed by: FAMILY MEDICINE

## 2022-06-29 PROCEDURE — 1090F PRES/ABSN URINE INCON ASSESS: CPT | Performed by: FAMILY MEDICINE

## 2022-06-29 PROCEDURE — 1123F ACP DISCUSS/DSCN MKR DOCD: CPT | Performed by: FAMILY MEDICINE

## 2022-06-29 PROCEDURE — 1101F PT FALLS ASSESS-DOCD LE1/YR: CPT | Performed by: FAMILY MEDICINE

## 2022-06-29 PROCEDURE — G0439 PPPS, SUBSEQ VISIT: HCPCS | Performed by: FAMILY MEDICINE

## 2022-06-29 PROCEDURE — G8510 SCR DEP NEG, NO PLAN REQD: HCPCS | Performed by: FAMILY MEDICINE

## 2022-06-29 PROCEDURE — 99213 OFFICE O/P EST LOW 20 MIN: CPT | Performed by: FAMILY MEDICINE

## 2022-06-29 PROCEDURE — G8399 PT W/DXA RESULTS DOCUMENT: HCPCS | Performed by: FAMILY MEDICINE

## 2022-06-29 PROCEDURE — G8417 CALC BMI ABV UP PARAM F/U: HCPCS | Performed by: FAMILY MEDICINE

## 2022-06-29 NOTE — PROGRESS NOTES
This is the Subsequent Medicare Annual Wellness Exam, performed 12 months or more after the Initial AWV or the last Subsequent AWV    I have reviewed the patient's medical history in detail and updated the computerized patient record. Assessment/Plan   Education and counseling provided:  Are appropriate based on today's review and evaluation    1. Medicare annual wellness visit, subsequent    Medicare Wellness Exam: Reviewed and addressed patient's medical history and concerns as discussed in note. Reviewed recommended screenings and immunizations. Discussed recommendations for diet, exercise, and lifestyle. Depression Risk Factor Screening     3 most recent PHQ Screens 6/29/2022   Little interest or pleasure in doing things Not at all   Feeling down, depressed, irritable, or hopeless Not at all   Total Score PHQ 2 0       Alcohol & Drug Abuse Risk Screen    Do you average more than 1 drink per night or more than 7 drinks a week:  No    On any one occasion in the past three months have you have had more than 3 drinks containing alcohol:  No          Functional Ability and Level of Safety    Hearing: Hearing is good. Activities of Daily Living: The home contains: handrails  Patient does total self care      Ambulation: with no difficulty     Fall Risk:  Fall Risk Assessment, last 12 mths 6/29/2022   Able to walk? Yes   Fall in past 12 months? 1   Do you feel unsteady? 0   Are you worried about falling 0   Is TUG test greater than 12 seconds? 0   Is the gait abnormal? 0   Number of falls in past 12 months 1   Fall with injury?  1      Abuse Screen:  Patient is not abused       Cognitive Screening    Has your family/caregiver stated any concerns about your memory: no     Cognitive Screening: Normal - Clock Drawing Test    Health Maintenance Due     Health Maintenance Due   Topic Date Due    COVID-19 Vaccine (1) Never done    DTaP/Tdap/Td series (1 - Tdap) Never done    Shingrix Vaccine Age 50> (1 of 2) Never done    Pneumococcal 65+ years (3 - PPSV23 or PCV20) 03/04/2021    A1C test (Diabetic or Prediabetic)  06/21/2022    Depression Screen  06/21/2022       Patient Care Team   Patient Care Team:  Di Kilgore MD as PCP - General (Family Medicine)  Di Kilgore MD as PCP [facility] Empaneled Provider  Jen Benitez MD (Cardiovascular Disease Physician)  Cristopher Zabala MD (Cardiovascular Disease Physician)  Janet Toussaint MD as Physician (Gastroenterology)  Danette Ma, RN as [facility] (Family Medicine)    History     Patient Active Problem List   Diagnosis Code    Hypertension I10    Obesity, morbid ([facility] [address] E66.01    Subcutaneous masses, trunk R22.9    Sick sinus syndrome ([facility] [address] I49.5    Pacemaker Z95.0    Family history of melanoma Z80.8    Tobacco use Z72.0    Hx of anaphylaxis Z87.892    Hemorrhoids K64.9    AVTAR (obstructive sleep apnea) G47.33    Hx of compression fracture of spine Z87.81    Impaired fasting glucose R73.01    Osteopenia of multiple sites M85.89    Smoker F17.200    Chronic left-sided low back pain with bilateral sciatica M54.41, M54.42, G89.29    Kidney stone N20.0    Tenderness of head and neck R51.9, M54.2     Past Medical History:   Diagnosis Date    Arrhythmia     sick sinus syndrome-has demand pacemaker    Arthritis     osteoarthritis-left foot and knee    High cholesterol     Hypertension     Ill-defined condition 2015    frontal lobe bleed    Menopause     Musculoskeletal disorder       Past Surgical History:   Procedure Laterality Date    COLONOSCOPY N/A 8/4/2020    COLONOSCOPY performed by [facility]aint MD at [facility]    HX ABDOMINAL LAPAROSCOPY      HX APPENDECTOMY      HX TONSILLECTOMY      HX TUBAL LIGATION  1989    [place] INS NEW/RPLCMT PRM PM W/TRANSV ELTRD ATRIAL&VENT N/A 7/26/2019    INSERT PPM DUAL performed by Cristopher Zabala MD at [facility] [facility]     Current Outpatient Medications   Medication Sig Dispense Refill    dilTIAZem ER (CARDIZEM CD) 120 mg capsule Take 1 Capsule by mouth daily. 90 Capsule 1    lisinopril-hydroCHLOROthiazide (PRINZIDE, ZESTORETIC) 20-25 mg per tablet Take 1 Tablet by mouth daily. 90 Tablet 1    cholecalciferol, vitamin D3, (D3-2000 PO) Take  by mouth.  mecobalamin (B12 ACTIVE PO) Take  by mouth.  rosuvastatin (CRESTOR) 10 mg tablet Take 1 Tablet by mouth nightly. 90 Tablet 3    ubidecarenone (COQ-10 PO) Take 1 Capsule by mouth daily.  melatonin 5 mg cap capsule Take 5 mg by mouth nightly.  OTHER Take 150 mg by mouth daily. NMN peptide    One scoop = 150 mg      OTHER resveratrol       zinc 50 mg tab tablet Take  by mouth daily.  diphenhydrAMINE (BENADRYL) 25 mg capsule Take 25 mg by mouth every four (4) hours as needed for Allergies.  MAGNESIUM CITRATE PO Take 1,000 Capsules by mouth daily.  EPINEPHrine (EPIPEN 2-ASHLEY) 0.3 mg/0.3 mL (1:1,000) injection 0.3 mL by IntraMUSCular route once as needed for up to 1 dose. 1 Syringe 2    acetaminophen (TYLENOL) 325 mg tablet Take 325 mg by mouth as needed for Pain. Indications: PAIN, head      aspirin 81 mg chewable tablet Take 81 mg by mouth daily. Indications: heart palpitations      buPROPion SR (WELLBUTRIN SR) 150 mg SR tablet Take one tablet daily for 3 days, then increase to one tablet twice a day.  Separate doses by at least 8 hours, last dose no later than 6 pm; stop smoking after 5-7 days (Patient not taking: Reported on 11/17/2021) 60 Tablet 2     Allergies   Allergen Reactions    Gluten Other (comments)    Chloromycetin [Chloramphenicol Sod Succinate] Other (comments)     Anemia         Family History   Problem Relation Age of Onset    Heart Disease Mother     Hypertension Mother     Diabetes Mother     Heart Disease Father     Diabetes Father     Hypertension Father      Social History     Tobacco Use    Smoking status: Former Smoker     Packs/day: 0.25     Years: 42.00     Pack years: 10.50     Types: Cigarettes     Quit date: 2022     Years since quittin.2    Smokeless tobacco: Never Used   Substance Use Topics    Alcohol use: Not Currently         Odessa Orantes MD

## 2022-06-29 NOTE — PATIENT INSTRUCTIONS

## 2022-06-29 NOTE — PROGRESS NOTES
Gregor Duran  74 y.o. female  1955  [address] [place] [id number]  [id number]     [facility] PRACTICE       Encounter Date: 6/29/2022           Established Patient Visit Note: Jumana Penny MD    Reason for Appointment:  Chief Complaint   Patient presents with    Hypertension    Follow Up Chronic Condition    Annual Wellness Visit         History of Present Illness:  History provided by patient    Gregor Duran is a 74 y.o. female who presents to clinic today for:     · Fall: lennie reports that she fell in late may hitting where her pacemaker is. They perfomred a CT scan that identified a 6mg x 8mm lung nodule within the apical posterior segment of the left upper lobe. · SSS s/p Pacermaker: followed by cardiology   · HTN: taking lisinopril-HCTZ and diltiazem; home BP. She is working to cut back on high sodium foods. · Hx of Kidney stones: she rdenies any recent symtpoms  · Chronic Low Back Pain and Hx of Compression Fracture of L4: she has added seated Cate Chi 4 days per week, and this has helped  · AVTAR: she has not yet followed up with sleep medicine, but she plans to call them soon  · Nicotine Dependence: she has quit with last cigarette at end of April, 2022  · Hyperlipidemia: continues Crestor; she has changed her diet to no longer eating meat  · Osteopenia: last DEXA was 12/29/21 with HF risk > 3%.; she is taking calcium supplement  · Vitamin D Deficiency: she is taking vitamoin d supplement    HM  She reports that she recently the [address] booster   She plans to get pneumonia vaccine at pharmacy        Review of Systems  All other ROS were reviewed and are negative except as discussed in HPI        Allergies: Gluten and Chloromycetin [chloramphenicol sod succinate]    Medications:     Current Outpatient Medications:     dilTIAZem ER (CARDIZEM CD) 120 mg capsule, Take 1 Capsule by mouth daily. , Disp: 90 Capsule, Rfl: 1    lisinopril-hydroCHLOROthiazide (PRINZIDE, ZESTORETIC) 20-25 mg per tablet, Take 1 Tablet by mouth daily. , Disp: 90 Tablet, Rfl: 1    cholecalciferol, vitamin D3, (D3-2000 PO), Take  by mouth., Disp: , Rfl:     mecobalamin (B12 ACTIVE PO), Take  by mouth., Disp: , Rfl:     rosuvastatin (CRESTOR) 10 mg tablet, Take 1 Tablet by mouth nightly., Disp: 90 Tablet, Rfl: 3    ubidecarenone (COQ-10 PO), Take 1 Capsule by mouth daily. , Disp: , Rfl:     melatonin 5 mg cap capsule, Take 5 mg by mouth nightly., Disp: , Rfl:     OTHER, Take 150 mg by mouth daily. NMN peptide  One scoop = 150 mg, Disp: , Rfl:     OTHER, resveratrol , Disp: , Rfl:     zinc 50 mg tab tablet, Take  by mouth daily. , Disp: , Rfl:     diphenhydrAMINE (BENADRYL) 25 mg capsule, Take 25 mg by mouth every four (4) hours as needed for Allergies. , Disp: , Rfl:     MAGNESIUM CITRATE PO, Take 1,000 Capsules by mouth daily. , Disp: , Rfl:     EPINEPHrine (EPIPEN 2-ASHLEY) 0.3 mg/0.3 mL (1:1,000) injection, 0.3 mL by IntraMUSCular route once as needed for up to 1 dose., Disp: 1 Syringe, Rfl: 2    acetaminophen (TYLENOL) 325 mg tablet, Take 325 mg by mouth as needed for Pain. Indications: PAIN, head, Disp: , Rfl:     aspirin 81 mg chewable tablet, Take 81 mg by mouth daily. Indications: heart palpitations, Disp: , Rfl:     buPROPion SR (WELLBUTRIN SR) 150 mg SR tablet, Take one tablet daily for 3 days, then increase to one tablet twice a day.  Separate doses by at least 8 hours, last dose no later than 6 pm; stop smoking after 5-7 days (Patient not taking: Reported on 11/17/2021), Disp: 60 Tablet, Rfl: 2    History  Patient Care Team:  Amber Richardson MD as PCP - General (Family Medicine)  Amber Richardson MD as PCP - [facility]  Sushil Hernandez MD (Cardiovascular Disease Physician)  Dayo Rhoades MD (Cardiovascular Disease Physician)  Drew Nolasco MD as Physician (Gastroenterology)  Aria Sewell RN as Ambulatory Care Manager (Family Medicine)    Past Medical History: she has a past medical history of Arrhythmia, Arthritis, High cholesterol, Hypertension, Ill-defined condition (2015), Menopause, and Musculoskeletal disorder. Past Surgical History: she has a past surgical history that includes hx appendectomy; hx tonsillectomy; hx abdominal laparoscopy; hx tubal ligation (1989); pr ins new/rplcmt prm pm w/transv eltrd atrial&vent (N/A, 7/26/2019); and colonoscopy (N/A, 8/4/2020). Family Medical History: family history includes Diabetes in her father and mother; Heart Disease in her father and mother; Hypertension in her father and mother. Social History: she reports that she quit smoking about 3 months ago. Her smoking use included cigarettes. She has a 10.50 pack-year smoking history. She has never used smokeless tobacco. She reports previous alcohol use. She reports that she does not use drugs. Objective:   Visit Vitals  /80 (BP 1 Location: Right upper arm, BP Patient Position: Sitting, BP Cuff Size: Large adult)   Pulse 64   Temp 97.9 °F (36.6 °C) (Temporal)   Resp 16   Ht 5' 4\" (1.626 m)   Wt 215 lb (97.5 kg)   SpO2 98%   BMI 36.90 kg/m²     Wt Readings from Last 3 Encounters:   06/29/22 215 lb (97.5 kg)   11/17/21 226 lb (102.5 kg)   10/14/21 220 lb (99.8 kg)       Physical Exam  Vitals and nursing note reviewed. Constitutional:       General: She is not in acute distress. Appearance: Normal appearance. HENT:      Head: Normocephalic and atraumatic. Nose: Nose normal.      Mouth/Throat:      Mouth: Mucous membranes are moist.   Eyes:      Extraocular Movements: Extraocular movements intact. Conjunctiva/sclera: Conjunctivae normal.      Pupils: Pupils are equal, round, and reactive to light. Cardiovascular:      Rate and Rhythm: Normal rate and regular rhythm. Pulses: Normal pulses. Heart sounds: Normal heart sounds. No murmur heard. No friction rub. No gallop.     Pulmonary:      Effort: Pulmonary effort is normal. No respiratory distress. Breath sounds: Normal breath sounds. No wheezing, rhonchi or rales. Musculoskeletal:         General: Normal range of motion. Cervical back: Normal range of motion and neck supple. No rigidity. No muscular tenderness. Lymphadenopathy:      Cervical: No cervical adenopathy. Skin:     General: Skin is warm. Coloration: Skin is not jaundiced. Neurological:      General: No focal deficit present. Mental Status: She is alert. Mental status is at baseline. Cranial Nerves: Cranial nerves are intact. Motor: Motor function is intact. Coordination: Coordination is intact. Psychiatric:         Mood and Affect: Mood normal.         Behavior: Behavior normal.         Thought Content: Thought content normal.         Judgment: Judgment normal.         Assessment & Plan:      ICD-10-CM ICD-9-CM    1. Essential hypertension  I10 401.9 CBC W/O DIFF      METABOLIC PANEL, COMPREHENSIVE      CBC W/O DIFF      METABOLIC PANEL, COMPREHENSIVE   2. Severe obesity (BMI 35.0-39. 9) with comorbidity ([address]  E66.01 278.01    3. Sick sinus syndrome (HCC)  I49.5 427.81    4. Lung nodule  R91.1 793.11 CT CHEST W CONT      CANCELED: CT CHEST W CONT   5. Elevated glucose  R73.09 790.29 HEMOGLOBIN A1C WITH EAG      HEMOGLOBIN A1C WITH EAG   6. Hyperlipidemia, unspecified hyperlipidemia type  E78.5 272.4 LIPID PANEL      LIPID PANEL   7. Vitamin D deficiency  E55.9 268.9 VITAMIN D, 25 HYDROXY      VITAMIN D, 25 HYDROXY   8. Obesity, morbid (HCC)  E66.01 278.01    9. Chronic left-sided low back pain with bilateral sciatica  M54.41 724.2     M54.42 724.3     G89.29 338.29    10. Hx of compression fracture of spine  Z87.81 V15.51    11. AVTAR (obstructive sleep apnea)  G47.33 327.23    12. Cigarette nicotine dependence in remission  F17.211 V15.82    13. Osteopenia of multiple sites  M85.89 733.90    14. Osteopenia, unspecified location  M85.80 733.90    15.  Hyperlipemia, mixed E78.2 272.2        · Osteopenia with Hx of Compression fracture and HF risk > 3%: Chronic, uncontrolled. Discussed options. She will be seeing her dentist in late August and she will discuss Fosamax vs Prolia infusions. She agrees to get back to us on her decision. · Lung Nodule: New problem, evaluate further as above. · Obesity: I have reviewed/discussed the above normal BMI with the patient. I have recommended the following interventions: dietary management education, guidance, and counseling, encourage exercise, monitor weight and prescribed dietary intake. · Nicotine Dependence: Chronic, in remission. Discussed recommendations for continued cessation.  All other conditions listed above: Chronic, stable and/or managed by specialist. Will continue current treatment regimen. Will check labs as reflected above. Discussed following up with specialist as scheduled. Discussed recommendations for diet and exercise. I have discussed the diagnosis with the patient and the intended plan as seen in the above orders. The patient has received an after-visit summary along with patient information handout. I have discussed medication side effects and warnings with the patient as well. Disposition  Follow-up and Dispositions    · Return in about 5 months (around 12/1/2022) for follow up of chronic conditions.            Jace Jarvis MD

## 2022-07-10 PROBLEM — F17.211 CIGARETTE NICOTINE DEPENDENCE IN REMISSION: Status: ACTIVE | Noted: 2020-03-04

## 2022-07-10 PROBLEM — E78.2 HYPERLIPEMIA, MIXED: Status: ACTIVE | Noted: 2022-07-10

## 2022-07-22 ENCOUNTER — PATIENT OUTREACH (OUTPATIENT)
Dept: CASE MANAGEMENT | Age: 67
End: 2022-07-22

## 2022-07-22 NOTE — PROGRESS NOTES
Patient has graduated from the Complex Case Management  program on 07/22/22    Patient/family has the ability to self-manage at this time. Care management goals have been completed. No further Ambulatory Care Manager follow up scheduled. Chart review completed. Goals Addressed                   This Visit's Progress     COMPLETED: Knowledge and adherence of prescribed medication (ie. action, side effects, missed dose, etc.).        10/15/21  Last PCP O/V 6/21/21. Patient noted with elevated cholesterol. PCP made medication adjustments. Patient to follow up with PCP in 3 mos. Reviewed labs with patient. Confirmed medication regimen with patient. Patient to schedule follow up visit with PCP to include fasting labs prior to next ACM outreach. PM     10/15/21  Cardiology: Per 10/1/21 message, patient noted with few, brief episodes of a faster heart beat. Provider to make medication adjustment to Diltiazem  mg daily. Reviewed purpose of medication with patient. Patient has not started new medication. Patient will contact cardiologist today to confirm new script sent to pharmacy. ACM to follow up with patient. PM      12/03/21  Patient states she is currently taking medications for B/P in the morning. Cholesterol med at night. Patient states she is taking B/P in the morning daily. Educated patient on proper techniques for obtaining B/P. Also instructed patient to obtain B/P 1-2 hrs after taking morning meds. Patient verbalized understanding. ACM to follow up with patient in 10-14  days. PM  2/16/22 1/27/22 Pt requested med refill on Lisinopril-HCTZ. Refill approved. Pt attended appointment with cards 2/16/22 as scheduled. PM              Patient has Ambulatory Care Manager's contact information for any further questions, concerns, or needs.   Patients upcoming visits:    Future Appointments   Date Time Provider Margi Fountain   8/4/2022  1:20 PM MD YADIRA Harvey   8/23/2022  9:00 AM [id number], [place] [facility]   8/23/2022  9:20 AM MD YADIRA Barakat AMB

## 2022-08-22 RX ORDER — LISINOPRIL AND HYDROCHLOROTHIAZIDE 20; 25 MG/1; MG/1
1 TABLET ORAL DAILY
Qty: 90 TABLET | Refills: 1 | Status: SHIPPED | OUTPATIENT
Start: 2022-08-22

## 2022-08-22 NOTE — TELEPHONE ENCOUNTER
PCP: Nohemi Baez MD    Last appt: 6/29/2022  No future appointments. Requested Prescriptions     Pending Prescriptions Disp Refills    lisinopril-hydroCHLOROthiazide (PRINZIDE, ZESTORETIC) 20-25 mg per tablet 90 Tablet 1     Sig: Take 1 Tablet by mouth daily.        Prior labs and Blood pressures:  BP Readings from Last 3 Encounters:   06/29/22 128/80   10/14/21 (!) 168/91   08/16/21 126/76     Lab Results   Component Value Date/Time    Sodium 140 11/17/2021 09:08 AM    Potassium 4.1 11/17/2021 09:08 AM    Chloride 102 11/17/2021 09:08 AM    CO2 23 11/17/2021 09:08 AM    Anion gap 5 02/16/2021 09:32 AM    Glucose 104 (H) 11/17/2021 09:08 AM    BUN 14 11/17/2021 09:08 AM    Creatinine 0.70 11/17/2021 09:08 AM    BUN/Creatinine ratio 20 11/17/2021 09:08 AM    GFR est  11/17/2021 09:08 AM    GFR est non-AA 89 11/17/2021 09:08 AM    Calcium 10.3 11/17/2021 09:08 AM     Lab Results   Component Value Date/Time    Hemoglobin A1c 5.3 06/21/2021 10:40 AM     Lab Results   Component Value Date/Time    Cholesterol, total 164 11/17/2021 09:08 AM    HDL Cholesterol 43 11/17/2021 09:08 AM    LDL, calculated 94 11/17/2021 09:08 AM    LDL, calculated 111 (H) 03/01/2019 08:53 AM    VLDL, calculated 27 11/17/2021 09:08 AM    VLDL, calculated 32 03/01/2019 08:53 AM    Triglyceride 157 (H) 11/17/2021 09:08 AM     Lab Results   Component Value Date/Time    VITAMIN D, 25-HYDROXY 20.4 (L) 03/01/2019 08:53 AM       Lab Results   Component Value Date/Time    TSH 1.170 11/17/2021 09:08 AM

## 2022-08-23 DIAGNOSIS — E78.5 HYPERLIPIDEMIA, UNSPECIFIED HYPERLIPIDEMIA TYPE: ICD-10-CM

## 2022-08-23 RX ORDER — ROSUVASTATIN CALCIUM 10 MG/1
10 TABLET, COATED ORAL
Qty: 90 TABLET | Refills: 0 | Status: SHIPPED | OUTPATIENT
Start: 2022-08-23

## 2022-08-23 NOTE — TELEPHONE ENCOUNTER
Last Visit: 6/29/22 MD Timothy Montelongo, lipid 11/2021  Next Appointment: None- due 12/1/2022  Previous Refill Encounter(s): 7/29/21 90 + 3    Requested Prescriptions     Pending Prescriptions Disp Refills    rosuvastatin (CRESTOR) 10 mg tablet 90 Tablet 0     Sig: Take 1 Tablet by mouth nightly. For [facility] in place:   Recommendation Provided To:    Intervention Detail: New Rx: 1, reason: Patient Preference  Gap Closed?:   Intervention Accepted By:   Time Spent (min): 5

## 2022-10-18 ENCOUNTER — HOSPITAL ENCOUNTER (EMERGENCY)
Age: 67
Discharge: HOME | End: 2022-10-18
Attending: EMERGENCY MEDICINE
Payer: MEDICARE

## 2022-10-18 ENCOUNTER — APPOINTMENT (OUTPATIENT)
Dept: GENERAL RADIOLOGY | Age: 67
End: 2022-10-18
Attending: EMERGENCY MEDICINE
Payer: MEDICARE

## 2022-10-18 VITALS
OXYGEN SATURATION: 94 % | HEART RATE: 67 BPM | DIASTOLIC BLOOD PRESSURE: 73 MMHG | TEMPERATURE: 98.1 F | SYSTOLIC BLOOD PRESSURE: 146 MMHG | RESPIRATION RATE: 21 BRPM

## 2022-10-18 DIAGNOSIS — M54.42 CHRONIC LEFT-SIDED LOW BACK PAIN WITH BILATERAL SCIATICA: ICD-10-CM

## 2022-10-18 DIAGNOSIS — R07.9 CHEST PAIN, UNSPECIFIED TYPE: Primary | ICD-10-CM

## 2022-10-18 DIAGNOSIS — M54.41 CHRONIC LEFT-SIDED LOW BACK PAIN WITH BILATERAL SCIATICA: ICD-10-CM

## 2022-10-18 DIAGNOSIS — G89.29 CHRONIC LEFT-SIDED LOW BACK PAIN WITH BILATERAL SCIATICA: ICD-10-CM

## 2022-10-18 DIAGNOSIS — F17.211 CIGARETTE NICOTINE DEPENDENCE IN REMISSION: ICD-10-CM

## 2022-10-18 LAB
ALBUMIN SERPL-MCNC: 3.2 G/DL (ref 3.5–5)
ALBUMIN/GLOB SERPL: 0.9 {RATIO} (ref 1.1–2.2)
ALP SERPL-CCNC: 86 U/L (ref 45–117)
ALT SERPL-CCNC: 18 U/L (ref 12–78)
ANION GAP SERPL CALC-SCNC: 5 MMOL/L (ref 5–15)
AST SERPL-CCNC: 7 U/L (ref 15–37)
ATRIAL RATE: 64 BPM
BASOPHILS # BLD: 0 K/UL (ref 0–0.1)
BASOPHILS NFR BLD: 1 % (ref 0–1)
BILIRUB SERPL-MCNC: 0.5 MG/DL (ref 0.2–1)
BNP SERPL-MCNC: 169 PG/ML
BUN SERPL-MCNC: 11 MG/DL (ref 6–20)
BUN/CREAT SERPL: 15 (ref 12–20)
CALCIUM SERPL-MCNC: 9.8 MG/DL (ref 8.5–10.1)
CALCULATED P AXIS, ECG09: 53 DEGREES
CALCULATED R AXIS, ECG10: -7 DEGREES
CALCULATED T AXIS, ECG11: 11 DEGREES
CHLORIDE SERPL-SCNC: 105 MMOL/L (ref 97–108)
CO2 SERPL-SCNC: 29 MMOL/L (ref 21–32)
COMMENT, HOLDF: NORMAL
CREAT SERPL-MCNC: 0.71 MG/DL (ref 0.55–1.02)
D DIMER PPP FEU-MCNC: 0.61 MG/L FEU (ref 0–0.65)
DIAGNOSIS, 93000: NORMAL
DIFFERENTIAL METHOD BLD: NORMAL
EOSINOPHIL # BLD: 0.2 K/UL (ref 0–0.4)
EOSINOPHIL NFR BLD: 2 % (ref 0–7)
ERYTHROCYTE [DISTWIDTH] IN BLOOD BY AUTOMATED COUNT: 12.1 % (ref 11.5–14.5)
GLOBULIN SER CALC-MCNC: 3.7 G/DL (ref 2–4)
GLUCOSE SERPL-MCNC: 107 MG/DL (ref 65–100)
HCT VFR BLD AUTO: 38 % (ref 35–47)
HGB BLD-MCNC: 13.3 G/DL (ref 11.5–16)
IMM GRANULOCYTES # BLD AUTO: 0 K/UL (ref 0–0.04)
IMM GRANULOCYTES NFR BLD AUTO: 0 % (ref 0–0.5)
LIPASE SERPL-CCNC: 146 U/L (ref 73–393)
LYMPHOCYTES # BLD: 2.1 K/UL (ref 0.8–3.5)
LYMPHOCYTES NFR BLD: 24 % (ref 12–49)
MCH RBC QN AUTO: 30.6 PG (ref 26–34)
MCHC RBC AUTO-ENTMCNC: 35 G/DL (ref 30–36.5)
MCV RBC AUTO: 87.4 FL (ref 80–99)
MONOCYTES # BLD: 0.6 K/UL (ref 0–1)
MONOCYTES NFR BLD: 7 % (ref 5–13)
NEUTS SEG # BLD: 5.7 K/UL (ref 1.8–8)
NEUTS SEG NFR BLD: 66 % (ref 32–75)
NRBC # BLD: 0 K/UL (ref 0–0.01)
NRBC BLD-RTO: 0 PER 100 WBC
P-R INTERVAL, ECG05: 162 MS
PLATELET # BLD AUTO: 260 K/UL (ref 150–400)
PMV BLD AUTO: 9.8 FL (ref 8.9–12.9)
POTASSIUM SERPL-SCNC: 3 MMOL/L (ref 3.5–5.1)
PROT SERPL-MCNC: 6.9 G/DL (ref 6.4–8.2)
Q-T INTERVAL, ECG07: 422 MS
QRS DURATION, ECG06: 94 MS
QTC CALCULATION (BEZET), ECG08: 435 MS
RBC # BLD AUTO: 4.35 M/UL (ref 3.8–5.2)
SAMPLES BEING HELD,HOLD: NORMAL
SODIUM SERPL-SCNC: 139 MMOL/L (ref 136–145)
TROPONIN-HIGH SENSITIVITY: 28 NG/L (ref 0–51)
TROPONIN-HIGH SENSITIVITY: 8 NG/L (ref 0–51)
VENTRICULAR RATE, ECG03: 64 BPM
WBC # BLD AUTO: 8.5 K/UL (ref 3.6–11)

## 2022-10-18 PROCEDURE — 85025 COMPLETE CBC W/AUTO DIFF WBC: CPT

## 2022-10-18 PROCEDURE — 36415 COLL VENOUS BLD VENIPUNCTURE: CPT

## 2022-10-18 PROCEDURE — 83690 ASSAY OF LIPASE: CPT

## 2022-10-18 PROCEDURE — 80053 COMPREHEN METABOLIC PANEL: CPT

## 2022-10-18 PROCEDURE — 99284 EMERGENCY DEPT VISIT MOD MDM: CPT | Performed by: SPECIALIST

## 2022-10-18 PROCEDURE — 85379 FIBRIN DEGRADATION QUANT: CPT

## 2022-10-18 PROCEDURE — 99285 EMERGENCY DEPT VISIT HI MDM: CPT

## 2022-10-18 PROCEDURE — 71045 X-RAY EXAM CHEST 1 VIEW: CPT

## 2022-10-18 PROCEDURE — 93005 ELECTROCARDIOGRAM TRACING: CPT

## 2022-10-18 PROCEDURE — 84484 ASSAY OF TROPONIN QUANT: CPT

## 2022-10-18 PROCEDURE — 83880 ASSAY OF NATRIURETIC PEPTIDE: CPT

## 2022-10-18 RX ORDER — NITROGLYCERIN 0.4 MG/1
0.4 TABLET SUBLINGUAL
Qty: 30 TABLET | Refills: 0 | Status: SHIPPED | OUTPATIENT
Start: 2022-10-18

## 2022-10-18 NOTE — CONSULTS
[facility]  Cardiology Care Note                  [x]Initial visit     []Established visit     Patient Name: Mariely Mak - :1955 - RZO:[id number]  Primary Cardiologist: unknown  Consulting Cardiologist: Leroy Duque MD     Reason for initial visit: chest pain. HPI:   Ms Quentin Rivera is a 74year old female who sees Dr Maty Palacio with hx of SSS, s/p PPM, HTN, kdiney stones, AVTAR, nicotine dependence and hyperlipidemia who presented to [address] [place] [address] ED for chest pain. She reports feeling viral after her recent COVID booster on Saturday. This morning she felt ok prior to eating breakfast of coffee, and yogurt. She works from home which she says is not stressful, when she suddenly developed substernal burning in her chest that radiated to the left and right anterior chest wall. She felt short of breath however admits that this may have been her anxiety. The discomfort changed to pressure and radiated down her left arm. She took [employer] and waited prior to coming to ED. The pain waxed and waned before going away. SUBJECTIVE:    At this time she is pain free, feeling in usual state of health. Sx have resolved. Assessment and Plan     1.chest pain  2. Htn  3. SSS - S/p PPM in  by Dr. Maty Palacio  4. Tobacco use  5. Obesity - BMI 36    Atypical chest pain with Hstrop of 8; second lab pending. Risk factors for CAD include post menopausal female, smoker with hyperlipidemia and BMI 36. EKG normal sinus, A paced. CBC WNL. D Dimer pending. If her lab are within normal range and she remains symptom free, will plan for outpatient lexiscan stress test. D/C home with SL NTG. Patient seen on the day of progress note and examined  and agree with Advance Practice Provider (PHYLLIS, NP,PA)  assessment and plans.   Discussed with patient  No further heartburn  Ddimer normal waiting for second troponin  Ecg without ischemic changes  If second troponin normal proceed with outpatient stress test  Patient aware to return immediately to er if any recurrent chest discomfort         ____________________________________________________________    Cardiac testing  03/28/19    ECHO STRESS 03/29/2019 4/2/2019    Interpretation Summary  · Baseline ECG: Normal EKG. · Low risk [facility] treadmill score. · Negative stress test.  · Normal stress echocardiogram. Low risk study. Signed by: Yessica Rea MD on 3/29/2019 11:32 AM        08/12/21    NUCLEAR CARDIAC STRESS TEST 08/17/2021 8/20/2021    Interpretation Summary  · SPECT: Left ventricular perfusion is normal. Myocardial perfusion imaging supports a low risk stress test.  · SPECT: Left ventricular function post-stress was normal. Calculated ejection fraction is 68%. There is no evidence of transient ischemic dilation (TID). The TID ratio is 1.08.  · Baseline ECG: Normal sinus rhythm, non-specific ST-T wave abnormalities. Signed by: Julianne Vanessa MD on 8/17/2021  1:18 PM          Most recent HS troponins:  Recent Labs     10/18/22  1208   TROPHS 8     ECG: A paced and sinus rhythm.     Review of Systems    [x]All other systems reviewed and all negative except as written in HPI    [] Patient unable to provide secondary to condition         Past Medical History:   Diagnosis Date    Arrhythmia     sick sinus syndrome-has demand pacemaker    Arthritis     osteoarthritis-left foot and knee    High cholesterol     Hypertension     Ill-defined condition 2015    frontal lobe bleed    Menopause     Musculoskeletal disorder      Past Surgical History:   Procedure Laterality Date    COLONOSCOPY N/A 8/4/2020    COLONOSCOPY performed by Kevin Larsen MD at [facility] ENDOSCOPY    HX ABDOMINAL LAPAROSCOPY      HX APPENDECTOMY      HX TONSILLECTOMY      HX [address] INS NEW/RPLCMT [address] [facility] [place] PM W/TRANSV ELTRD ATRIAL&VENT N/A 7/26/2019    INSERT PPM DUAL performed by Deepa Winters MD at [facility] CARDIAC CATH LAB     Social Hx:  reports that she quit smoking about 6 months ago. Her smoking use included cigarettes. She has a 10.50 pack-year smoking history. She has never used smokeless tobacco. She reports that she does not currently use alcohol. She reports that she does not use drugs. Family Hx: family history includes Diabetes in her father and mother; Heart Disease in her father and mother; Hypertension in her father and mother. Allergies   Allergen Reactions    Gluten Other (comments)    Chloromycetin [Chloramphenicol Sod Succinate] Other (comments)     Anemia            OBJECTIVE:  Wt Readings from Last 3 Encounters:   06/29/22 215 lb (97.5 kg)   11/17/21 226 lb (102.5 kg)   10/14/21 220 lb (99.8 kg)     No intake or output data in the 24 hours ending 10/18/22 1621      Physical Exam    Vitals:   Vitals:    10/18/22 1150   BP: (!) 136/97   Pulse: 68   Resp: 20   Temp: 98.1 °F (36.7 °C)   SpO2: 95%     Telemetry: A paced, NSR with pacs       General:    Alert, cooperative, no distress, appears stated age. Neck:   Supple, no carotid bruit and no JVD. Back:     Symmetric,    Lungs:     clear to auscultation bilaterally. Heart[de-identified]    Regular rate and rhythm, S1, S2 normal, no murmur, click, rub or gallop. Abdomen:     Soft, non-tender. Bowel sounds normal.    Extremities:   Extremities normal, atraumatic, no cyanosis or edema. Vascular:   Pulses - normal   Skin:   Skin color normal. No rashes or lesions on visible areas   Neurologic:   Alert, Moves all extremities. Data Review:     Radiology:   XR Results (most recent):  Results from Hospital Encounter encounter on 10/18/22    XR CHEST PORT    Narrative  EXAM:  XR CHEST PORT    INDICATION: Chest pain    COMPARISON: Chest x-ray 7/26/2019    TECHNIQUE: Upright portable chest AP view    FINDINGS: Left subclavian pacemaker with dual leads is again shown.  Cardiac size  is again shown to be upper limits of normal. There is mild thoracic aortic  tortuosity with otherwise normal mediastinal and hilar contour. The pulmonary  vasculature is within normal limits. The lungs and pleural spaces are clear. The visualized bones and upper abdomen  are age-appropriate. Impression  No acute findings. CT Results (most recent):  Results from Hospital Encounter encounter on 10/14/21    CT HEAD WO CONT    Narrative  EXAM:  CT HEAD WO CONT  INDICATION:  head injury, headache, Head injury earlier today and now with  persistent headache. TECHNIQUE: Thin axial images were obtained through the calvarium and saved with  standard and bone window algorithm. CT dose reduction was achieved through use  of a standardized protocol tailored for this examination and automatic exposure  control for dose modulation. COMPARISON: CT head 6/17/16  FINDINGS:  The ventricular size and configuration are within normal limits. Focal encephalomalacia right anterior frontal lobe/cortex corresponding to  previous parenchymal hemorrhage in 2015. No significant change since 2016. Cerebral density is otherwise within normal limits. No evidence of acute intracranial hemorrhage, infarct, mass or abnormal  extra-axial fluid collections. Visualized osseous structures of the calvarium and skull base including  paranasal sinuses are unremarkable. Impression  1. Stable focal right anterior frontal encephalomalacia at site of previous  hemorrhage 2015. 2. No acute intracranial abnormality demonstrated. MRI Results (most recent):  No results found for this or any previous visit. No results for input(s): CPK, TROIQ in the last 72 hours. No lab exists for component: CKQMB, CPKMB, BMPP  No results for input(s): NA, K, CL, CO2, BUN, CREA, GLU, PHOS, CA in the last 72 hours. Recent Labs     10/18/22  1207   WBC 8.5   HGB 13.3   HCT 38.0        No results for input(s): PTP, INR, AP, INREXT, INREXT in the last 72 hours.     No lab exists for component: PTTP, GPT, SGOT  No results for input(s): CHOL, LDLC in the last 72 hours. No lab exists for component: TGL, HDLC,  HBA1C  No results for input(s): CRP, TSH, TSHEXT, TSHEXT in the last 72 hours. No lab exists for component: ESR        Current meds:  No current facility-administered medications for this encounter. Current Outpatient Medications:     rosuvastatin (CRESTOR) 10 mg tablet, Take 1 Tablet by mouth nightly., Disp: 90 Tablet, Rfl: 0    lisinopril-hydroCHLOROthiazide (PRINZIDE, ZESTORETIC) 20-25 mg per tablet, Take 1 Tablet by mouth daily. , Disp: 90 Tablet, Rfl: 1    dilTIAZem ER (CARDIZEM CD) 120 mg capsule, Take 1 Capsule by mouth daily. , Disp: 90 Capsule, Rfl: 1    cholecalciferol, vitamin D3, (D3-2000 PO), Take  by mouth., Disp: , Rfl:     mecobalamin (B12 ACTIVE PO), Take  by mouth., Disp: , Rfl:     ubidecarenone (COQ-10 PO), Take 1 Capsule by mouth daily. , Disp: , Rfl:     melatonin 5 mg cap capsule, Take 5 mg by mouth nightly., Disp: , Rfl:     OTHER, Take 150 mg by mouth daily. NMN peptide  One scoop = 150 mg, Disp: , Rfl:     OTHER, resveratrol , Disp: , Rfl:     buPROPion SR (WELLBUTRIN SR) 150 mg SR tablet, Take one tablet daily for 3 days, then increase to one tablet twice a day. Separate doses by at least 8 hours, last dose no later than 6 pm; stop smoking after 5-7 days (Patient not taking: Reported on 11/17/2021), Disp: 60 Tablet, Rfl: 2    zinc 50 mg tab tablet, Take  by mouth daily. , Disp: , Rfl:     diphenhydrAMINE (BENADRYL) 25 mg capsule, Take 25 mg by mouth every four (4) hours as needed for Allergies. , Disp: , Rfl:     MAGNESIUM CITRATE PO, Take 1,000 Capsules by mouth daily. , Disp: , Rfl:     EPINEPHrine (EPIPEN 2-ASHLEY) 0.3 mg/0.3 mL (1:1,000) injection, 0.3 mL by IntraMUSCular route once as needed for up to 1 dose., Disp: 1 Syringe, Rfl: 2    acetaminophen (TYLENOL) 325 mg tablet, Take 325 mg by mouth as needed for Pain.  Indications: PAIN, head, Disp: , Rfl:     aspirin 81 mg chewable tablet, Take 81 mg by mouth daily.  Indications: heart palpitations, Disp: , Rfl:     Mariana Armijo MD  [facility] [address] [place] [address] 13, [address] [address]  [address]  [phone]      Shalonda Mcfadden MD

## 2022-10-18 NOTE — ED NOTES
Care assumed from Dr. Sally Boucher at 3 PM.  Please see his note for full H&P.  40-year-old female presents with transient episode of chest pain. Initial troponin negative. D-dimer and repeat Trop pending at time of signout and returned reassuringly showing no significant abnormalities. Cardiology consulted  And saw the patient at the bedside. Recommended discharge home with outpatient stress testing if repeat troponin negative. Labs returned reassuringly. Rx nitroglycerin to use as needed and recommended close cardiology follow-up for further evaluation. This plan was discussed with the patient at the bedside and she stated with understanding and agreement.

## 2022-10-18 NOTE — ED TRIAGE NOTES
Pt arrives via EMS from home c/o LEFT sided chest pain that started this morning. Pt reports pain radiated down LEFT arm.   Pt took Pepto and ASA 324mg prior to arrival.  Denies pain on arrival.

## 2022-10-18 NOTE — ED PROVIDER NOTES
This is a 66-year-old female with a history of sick sinus syndrome with a pacemaker implanted. She has a history of hypertension and elevated cholesterol. She she states that she was at home working on stressful job. This morning while engaged in that pursuit, she developed substernal burning which gradually progressed to substernal pain. She had some tingling down the left arm. She did not have any nausea or vomiting and no sweating. She states that the symptoms progressed and the pain and lasted for approximately 45 minutes. Since that time she has had slight intermittent discomfort. The arm discomfort is completely disappeared. Her chest is still slightly uncomfortable. She denies any other acute symptomatology like shortness of breath. She has had no abdominal pain, diarrhea or urinary symptoms. He has no history of exercise intolerance. She did have COVID booster on Friday as well as a flu vaccine. She has had some aches and pains in her legs from the flu vaccine. She has not had any other significant reactions by history these medicines.          Past Medical History:   Diagnosis Date    Arrhythmia     sick sinus syndrome-has demand pacemaker    Arthritis     osteoarthritis-left foot and knee    High cholesterol     Hypertension     Ill-defined condition 2015    frontal lobe bleed    Menopause     Musculoskeletal disorder        Past Surgical History:   Procedure Laterality Date    COLONOSCOPY N/A 8/4/2020    COLONOSCOPY performed by King Matheus MD at [facility] ENDOSCOPY    HX ABDOMINAL LAPAROSCOPY      HX APPENDECTOMY      HX TONSILLECTOMY      HX TUBAL LIGATION  1989    VT INS NEW/RPLCMT PRM PM W/TRANSV ELTRD ATRIAL&VENT N/A 7/26/2019    INSERT PPM DUAL performed by Jesus Pa MD at [address]  CATH LAB         Family History:   Problem Relation Age of Onset    Heart Disease Mother     Hypertension Mother     Diabetes Mother     Heart Disease Father     Diabetes Father     Hypertension Father Social History     Socioeconomic History    Marital status:      Spouse name: Not on file    Number of children: Not on file    Years of education: Not on file    Highest education level: Not on file   Occupational History    Not on file   Tobacco Use    Smoking status: Former     Packs/day: 0.25     Years: 42.00     Pack years: 10.50     Types: Cigarettes     Quit date: 2022     Years since quittin.5    Smokeless tobacco: Never   Vaping Use    Vaping Use: Never used   Substance and Sexual Activity    Alcohol use: Not Currently    Drug use: No    Sexual activity: Never   Other Topics Concern    Not on file   Social History Narrative    Not on file     Social Determinants of Health     Financial Resource Strain: Not on file   Food Insecurity: Not on file   Transportation Needs: Not on file   Physical Activity: Not on file   Stress: Not on file   Social Connections: Not on file   Intimate Partner Violence: Not on file   Housing Stability: Not on file         ALLERGIES: Gluten and Chloromycetin [chloramphenicol sod succinate]    Review of Systems   Constitutional:  Negative for activity change, appetite change, chills, fatigue and fever. HENT:  Negative for ear pain, facial swelling, sore throat and trouble swallowing. Eyes:  Negative for pain, discharge and visual disturbance. Respiratory:  Negative for chest tightness, shortness of breath and wheezing. Cardiovascular:  Positive for chest pain. Negative for palpitations and leg swelling. Gastrointestinal:  Negative for abdominal pain, blood in stool, nausea and vomiting. Genitourinary:  Negative for difficulty urinating, flank pain and hematuria. Musculoskeletal:  Negative for arthralgias, joint swelling, myalgias and neck pain. Skin:  Negative for color change and rash. Neurological:  Negative for dizziness, weakness, numbness and headaches. Hematological:  Negative for adenopathy. Does not bruise/bleed easily. Psychiatric/Behavioral:  Negative for behavioral problems, confusion and sleep disturbance. All other systems reviewed and are negative. Vitals:    10/18/22 1150   BP: (!) 136/97   Pulse: 68   Resp: 20   Temp: 98.1 °F (36.7 °C)   SpO2: 95%            Physical Exam  Vitals and nursing note reviewed. Constitutional:       General: She is not in acute distress. Appearance: She is well-developed. She is not ill-appearing or diaphoretic. Comments: This is a 75-year-old female who presents with chest pain and some pain in the left arm this morning. Vital signs are as noted. HENT:      Head: Normocephalic and atraumatic. Nose: Nose normal.      Mouth/Throat:      Mouth: Mucous membranes are moist.   Eyes:      General: No scleral icterus. Conjunctiva/sclera: Conjunctivae normal.      Pupils: Pupils are equal, round, and reactive to light. Neck:      Thyroid: No thyromegaly. Vascular: No JVD. Trachea: No tracheal deviation. Comments: No carotid bruits noted. Cardiovascular:      Rate and Rhythm: Normal rate and regular rhythm. Heart sounds: Normal heart sounds. No murmur heard. No friction rub. No gallop. Pulmonary:      Effort: Pulmonary effort is normal. No respiratory distress. Breath sounds: Normal breath sounds. No wheezing or rales. Chest:      Chest wall: No tenderness. Abdominal:      General: Bowel sounds are normal. There is no distension. Palpations: Abdomen is soft. There is no mass. Tenderness: There is no abdominal tenderness. There is no guarding or rebound. Musculoskeletal:         General: No tenderness. Normal range of motion. Cervical back: Normal range of motion and neck supple. Lymphadenopathy:      Cervical: No cervical adenopathy. Skin:     General: Skin is warm and dry. Capillary Refill: Capillary refill takes 2 to 3 seconds. Findings: No erythema or rash.    Neurological:      Mental Status: She is alert and oriented to person, place, and time. Cranial Nerves: No cranial nerve deficit. Coordination: Coordination normal.      Deep Tendon Reflexes: Reflexes are normal and symmetric. Psychiatric:         Behavior: Behavior normal.         Thought Content: Thought content normal.         Judgment: Judgment normal.        MDM     Amount and/or Complexity of Data Reviewed  Clinical lab tests: ordered and reviewed  Tests in the radiology section of CPT®: ordered and reviewed  Decide to obtain previous medical records or to obtain history from someone other than the patient: yes  Review and summarize past medical records: yes    Risk of Complications, Morbidity, and/or Mortality  Presenting problems: high  Diagnostic procedures: high  Management options: high    Patient Progress  Patient progress: stable         Procedures    ED MD EKG interpretation: There is a normal sinus rhythm at about 60 beats a minute. Occasional PAC is noted. There are T wave inversions in V2 through V4 otherwise nonspecific ST change. No evidence of acute ischemia is demonstrated. Casey Gimenez MD    This is a 49-year-old female with no significant history of coronary disease in the past.  She does have a pacer for sick sinus syndrome. She presents with substernal chest pain with some radiation to the left arm. The longest duration of pain was some 45 minutes. She is now pain-free. EKG is nonacute. Will evaluate with labs and x-ray and reevaluate. The patient's lab including troponin appear normal.  Chest x-ray is clear. A repeat troponin is being ordered. Given the fact this patient had chest pain for 45 minutes, will discuss with cardiology and likely may need to be admitted for further evaluation. Repeat troponin and D-dimer ordered. Addendum 12/31/2022: This patient was signed over to Dr. Arthur Vicente at change of shift. There was D-dimer and repeat troponin pending at the time of turnover.   The patient was stable and having no acute symptomatology. Hemodynamically she was stable and rhythms were stable. Dr. Ginette Camp will evaluate the repeat troponin and the D-dimer and do any interventions that may be necessary at the time including discharge home. Sign off was completed.

## 2022-11-10 RX ORDER — DILTIAZEM HYDROCHLORIDE 120 MG/1
120 CAPSULE, COATED, EXTENDED RELEASE ORAL DAILY
Qty: 90 CAPSULE | Refills: 1 | Status: SHIPPED | OUTPATIENT
Start: 2022-11-10

## 2022-11-10 RX ORDER — LISINOPRIL AND HYDROCHLOROTHIAZIDE 20; 25 MG/1; MG/1
1 TABLET ORAL DAILY
Qty: 90 TABLET | Refills: 1 | OUTPATIENT
Start: 2022-11-10

## 2022-11-10 NOTE — TELEPHONE ENCOUNTER
Prinzide was sent on 8/22/22 for #90 with 1 refill        For [id number] in place:   Recommendation Provided To:    Intervention Detail: New Rx: 1, reason: Patient Preference  Gap Closed?:   Intervention Accepted By:   Time Spent (min): 5

## 2022-11-10 NOTE — TELEPHONE ENCOUNTER
Request for Diltiazem 120 mg daily. Last office visit 8/3/21, next office visit 4/20/23. Refills per verbal order from Dr. Ifeanyi Omalley.

## 2022-11-13 NOTE — TELEPHONE ENCOUNTER
Patient refused AM vitals.   Received alert that since 08/03/2021, 7 VT & 1 fast A&V episodes noted. Presenting EGMs shows SVT (1:1 conduction) with max V rate 176 bpm.    Known history of PAT. Recent nuclear stress test unremarkable with normal LVEF. Recommend starting diltiazem  mg po daily. Will continue to monitor remotely.

## 2023-01-04 RX ORDER — LISINOPRIL AND HYDROCHLOROTHIAZIDE 20; 25 MG/1; MG/1
1 TABLET ORAL DAILY
Qty: 90 TABLET | Refills: 1 | Status: SHIPPED | OUTPATIENT
Start: 2023-01-04

## 2023-01-04 NOTE — TELEPHONE ENCOUNTER
Last Visit: 6/29/22 MD Stephanie Lamar  Next Appointment: None  Previous Refill Encounter(s): 8/22/22 90 + 1    Requested Prescriptions     Pending Prescriptions Disp Refills    lisinopril-hydroCHLOROthiazide (PRINZIDE, ZESTORETIC) 20-25 mg per tablet 90 Tablet 1     Sig: Take 1 Tablet by mouth daily. For Pharmacy Admin Tracking Only    Program: Medication Refill  CPA in place:   Recommendation Provided To:    Intervention Detail: New Rx: 1, reason: Patient Preference  Intervention Accepted By:   Richardean Eisenmenger Closed?:   Time Spent (min): 5

## 2023-03-24 ENCOUNTER — PATIENT MESSAGE (OUTPATIENT)
Dept: CARDIOLOGY CLINIC | Age: 68
End: 2023-03-24

## 2023-05-22 ENCOUNTER — PROCEDURE VISIT (OUTPATIENT)
Age: 68
End: 2023-05-22
Payer: MEDICARE

## 2023-05-22 ENCOUNTER — OFFICE VISIT (OUTPATIENT)
Age: 68
End: 2023-05-22
Payer: MEDICARE

## 2023-05-22 VITALS
BODY MASS INDEX: 38.93 KG/M2 | SYSTOLIC BLOOD PRESSURE: 130 MMHG | HEART RATE: 88 BPM | HEIGHT: 64 IN | OXYGEN SATURATION: 97 % | WEIGHT: 228 LBS | DIASTOLIC BLOOD PRESSURE: 80 MMHG

## 2023-05-22 DIAGNOSIS — Z95.0 CARDIAC PACEMAKER IN SITU: Primary | ICD-10-CM

## 2023-05-22 DIAGNOSIS — I47.29 NSVT (NONSUSTAINED VENTRICULAR TACHYCARDIA) (HCC): ICD-10-CM

## 2023-05-22 DIAGNOSIS — I47.1 PAT (PAROXYSMAL ATRIAL TACHYCARDIA) (HCC): ICD-10-CM

## 2023-05-22 DIAGNOSIS — Z95.0 PACEMAKER: Primary | ICD-10-CM

## 2023-05-22 DIAGNOSIS — I49.5 SICK SINUS SYNDROME (HCC): ICD-10-CM

## 2023-05-22 DIAGNOSIS — I10 PRIMARY HYPERTENSION: ICD-10-CM

## 2023-05-22 DIAGNOSIS — Z95.0 PACEMAKER: ICD-10-CM

## 2023-05-22 PROCEDURE — 3079F DIAST BP 80-89 MM HG: CPT | Performed by: NURSE PRACTITIONER

## 2023-05-22 PROCEDURE — 99214 OFFICE O/P EST MOD 30 MIN: CPT | Performed by: NURSE PRACTITIONER

## 2023-05-22 PROCEDURE — 3075F SYST BP GE 130 - 139MM HG: CPT | Performed by: NURSE PRACTITIONER

## 2023-05-22 PROCEDURE — 1123F ACP DISCUSS/DSCN MKR DOCD: CPT | Performed by: NURSE PRACTITIONER

## 2023-05-22 PROCEDURE — 93280 PM DEVICE PROGR EVAL DUAL: CPT | Performed by: INTERNAL MEDICINE

## 2023-05-22 RX ORDER — DILTIAZEM HYDROCHLORIDE 120 MG/1
120 CAPSULE, EXTENDED RELEASE ORAL DAILY
Qty: 90 CAPSULE | Refills: 1 | Status: SHIPPED | OUTPATIENT
Start: 2023-05-22

## 2023-05-22 RX ORDER — LISINOPRIL AND HYDROCHLOROTHIAZIDE 25; 20 MG/1; MG/1
1 TABLET ORAL DAILY
Qty: 90 TABLET | Refills: 1 | Status: SHIPPED | OUTPATIENT
Start: 2023-05-22

## 2023-05-22 ASSESSMENT — PATIENT HEALTH QUESTIONNAIRE - PHQ9
1. LITTLE INTEREST OR PLEASURE IN DOING THINGS: 0
SUM OF ALL RESPONSES TO PHQ QUESTIONS 1-9: 0
2. FEELING DOWN, DEPRESSED OR HOPELESS: 0
SUM OF ALL RESPONSES TO PHQ QUESTIONS 1-9: 0
SUM OF ALL RESPONSES TO PHQ9 QUESTIONS 1 & 2: 0
SUM OF ALL RESPONSES TO PHQ QUESTIONS 1-9: 0
SUM OF ALL RESPONSES TO PHQ QUESTIONS 1-9: 0

## 2023-05-23 ENCOUNTER — TELEPHONE (OUTPATIENT)
Age: 68
End: 2023-05-23

## 2023-05-23 LAB
BUN SERPL-MCNC: 9 MG/DL (ref 8–27)
BUN/CREAT SERPL: 12 (ref 12–28)
CALCIUM SERPL-MCNC: 10 MG/DL (ref 8.7–10.3)
CHLORIDE SERPL-SCNC: 103 MMOL/L (ref 96–106)
CO2 SERPL-SCNC: 22 MMOL/L (ref 20–29)
CREAT SERPL-MCNC: 0.74 MG/DL (ref 0.57–1)
EGFRCR SERPLBLD CKD-EPI 2021: 88 ML/MIN/1.73
GLUCOSE SERPL-MCNC: 96 MG/DL (ref 70–99)
POTASSIUM SERPL-SCNC: 3.8 MMOL/L (ref 3.5–5.2)
SODIUM SERPL-SCNC: 138 MMOL/L (ref 134–144)

## 2023-05-23 NOTE — TELEPHONE ENCOUNTER
----- Message from VENKAT Pryor - NP sent at 5/23/2023 10:06 AM EDT -----  BMP without significant acute abnormality.     Future Appointments  8/23/2023  2:00 PM    REMOTE1, RIZWANA DASILVA AMB  7/2/2024   10:20 AM   PACEMAKER[facility], RIZWANA DASILVA AMB  7/2/2024   10:40 AM   MD CARLINE Espino AMB

## 2023-07-26 RX ORDER — DILTIAZEM HYDROCHLORIDE 120 MG/1
CAPSULE, COATED, EXTENDED RELEASE ORAL
Qty: 90 CAPSULE | Refills: 1 | Status: SHIPPED | OUTPATIENT
Start: 2023-07-26

## 2023-10-26 NOTE — TELEPHONE ENCOUNTER
Receiving refill request for rosuvastatin. Noted last OV was 6/29/22 w/MD Richard Rodriguez. Please contact patient to schedule with new provider for refill. Thanks, [facility]    Last appointment: 6/29/22 MD Richard Rodriguez  Next appointment: None- Please contact patient to schedule w/new provider/OV/Labs due  Previous refill encounter(s): 8/23/22 90 (states last filled 7/27/23    Requested Prescriptions     Pending Prescriptions Disp Refills    rosuvastatin (CRESTOR) 10 MG tablet 30 tablet 0     Sig: Take 1 tablet by mouth nightly Office Visit and Labs past due. Needs new provider. For Pharmacy Admin Tracking Only    Program: Medication Refill  CPA in place:    Recommendation Provided To:    Intervention Detail: New Rx: 1, reason: Patient Preference  Intervention Accepted By:   Justice Thompson Closed?:    Time Spent (min): 5

## 2023-10-27 RX ORDER — ROSUVASTATIN CALCIUM 10 MG/1
10 TABLET, COATED ORAL NIGHTLY
Qty: 30 TABLET | Refills: 0 | OUTPATIENT
Start: 2023-10-27

## 2024-01-31 RX ORDER — DILTIAZEM HYDROCHLORIDE 120 MG/1
120 CAPSULE, COATED, EXTENDED RELEASE ORAL DAILY
Qty: 90 CAPSULE | Refills: 2 | Status: SHIPPED | OUTPATIENT
Start: 2024-01-31

## 2024-01-31 NOTE — TELEPHONE ENCOUNTER
Med refilled per VO by MD.    Future Appointments   Date Time Provider Department Center   6/25/2024 10:20 AM RIZWANA VICK AMB   6/25/2024 10:40 AM David Bruce MD [facility] AMB

## 2024-04-24 ENCOUNTER — TELEPHONE (OUTPATIENT)
Age: 69
End: 2024-04-24

## 2024-04-24 NOTE — TELEPHONE ENCOUNTER
Outgoing call made to pt to let her know her [employer] forms are filled out and she just needs to sign them. She can come in to sign and we can fax it to her insurance. Voicebox full. Will attempt to retry.

## 2024-04-25 ENCOUNTER — TELEPHONE (OUTPATIENT)
Age: 69
End: 2024-04-25

## 2024-04-25 NOTE — TELEPHONE ENCOUNTER
Patient is calling because her insurance company will give her a discount for filling a form out because she is a diabetic and has a heart condition.    Patient was unable to load the form up in [employer] so she is asking for an email so that she can have the doctor fill his portion out.    [phone]

## (undated) DEVICE — 1200 GUARD II KIT W/5MM TUBE W/O VAC TUBE: Brand: GUARDIAN

## (undated) DEVICE — Device

## (undated) DEVICE — POLYP TRAP: Brand: TRAPEASE®

## (undated) DEVICE — BAG SPEC BIOHZRD 10 X 10 IN --

## (undated) DEVICE — SUTURE VCRL SZ 2-0 L36IN ABSRB UD L40MM CT 1/2 CIR J957H

## (undated) DEVICE — SUTURE COAT VCRL PC 5 PRECIS COSM CONVENTIONAL CUT PRIM 3 8 J823H

## (undated) DEVICE — CATH IV AUTOGRD BC PNK 20GA 25 -- INSYTE

## (undated) DEVICE — DRAPE SURG W25XL50IN E OPN CIR BND BG

## (undated) DEVICE — CONTAINER SPEC 20 ML LID NEUT BUFF FORMALIN 10 % POLYPR STS

## (undated) DEVICE — 3M™ IOBAN™ 2 ANTIMICROBIAL INCISE DRAPE 6640EZ: Brand: IOBAN™ 2

## (undated) DEVICE — INTRO SHTH 9FR 13X20CM -- USE ITEM# 341577

## (undated) DEVICE — Device: Brand: PADPRO

## (undated) DEVICE — SOLIDIFIER MEDC 1200ML -- CONVERT TO 356117

## (undated) DEVICE — INTRO SHTH 7FR 13X20CM -- TEARAWAY

## (undated) DEVICE — 3M™ CUROS™ DISINFECTING CAP FOR NEEDLELESS CONNECTORS 270/CARTON 20 CARTONS/CASE CFF1-270: Brand: CUROS™

## (undated) DEVICE — LIMB HOLDER, WRIST/ANKLE: Brand: DEROYAL

## (undated) DEVICE — SET GRAV CK VLV NEEDLESS ST 3 GANGED 4WAY STPCOCK HI FLO 10

## (undated) DEVICE — BAG BELONG PT PERS CLEAR HANDL

## (undated) DEVICE — ELECTRODE,RADIOTRANSLUCENT,FOAM,3PK: Brand: MEDLINE

## (undated) DEVICE — PACEMAKER PACK: Brand: MEDLINE INDUSTRIES, INC.

## (undated) DEVICE — STAPLER SKIN SQ 30 ABSRB STPL DISP INSORB

## (undated) DEVICE — KENDALL RADIOLUCENT FOAM MONITORING ELECTRODE RECTANGULAR SHAPE: Brand: KENDALL

## (undated) DEVICE — PACK PROCEDURE SURG HRT CATH

## (undated) DEVICE — KIT COLON W/ 1.1OZ LUB AND 2 END

## (undated) DEVICE — CABLE PACE ALGTR CLP SAF 12FT --

## (undated) DEVICE — ADULT SPO2 SENSOR: Brand: NELLCOR

## (undated) DEVICE — TELFA NON-ADHERENT PADS PREPAK: Brand: TELFA

## (undated) DEVICE — (D)STRIP SKN CLSR 0.5X4IN WHT --

## (undated) DEVICE — 3M™ IOBAN™ 2 ANTIMICROBIAL INCISE DRAPE 6650EZ: Brand: IOBAN™ 2

## (undated) DEVICE — PLASMABLADE PS200-040 4.0: Brand: PLASMABLADE™

## (undated) DEVICE — CANN NASAL O2 CAPNOGRAPHY AD -- FILTERLINE

## (undated) DEVICE — SIMPLICITY FLUFF UNDERPAD 23X36, MODERATE: Brand: SIMPLICITY

## (undated) DEVICE — PENCIL ES L3M BTTN SWCH S STL HEX LOK BLDE ELECTRD HOLSTER

## (undated) DEVICE — REM POLYHESIVE ADULT PATIENT RETURN ELECTRODE: Brand: VALLEYLAB

## (undated) DEVICE — SNARE ENDOSCP M L240CM W27MM SHTH DIA2.4MM CHN 2.8MM OVL

## (undated) DEVICE — BULB SYRINGE, IRRIGATION WITH PROTECTIVE CAP, 60 CC, INDIVIDUALLY WRAPPED: Brand: DOVER